# Patient Record
Sex: FEMALE | Race: WHITE | Employment: PART TIME | URBAN - NONMETROPOLITAN AREA
[De-identification: names, ages, dates, MRNs, and addresses within clinical notes are randomized per-mention and may not be internally consistent; named-entity substitution may affect disease eponyms.]

---

## 2017-02-27 ENCOUNTER — NURSE TRIAGE (OUTPATIENT)
Dept: ADMINISTRATIVE | Age: 57
End: 2017-02-27

## 2017-03-24 PROBLEM — G93.40 ENCEPHALOPATHY ACUTE: Status: ACTIVE | Noted: 2017-03-24

## 2017-03-24 PROBLEM — F31.9 BIPOLAR 1 DISORDER (HCC): Chronic | Status: ACTIVE | Noted: 2017-03-24

## 2017-03-29 ENCOUNTER — TELEPHONE (OUTPATIENT)
Dept: CARDIOLOGY | Age: 57
End: 2017-03-29

## 2017-08-15 ENCOUNTER — HOSPITAL ENCOUNTER (OUTPATIENT)
Age: 57
Discharge: HOME | End: 2017-08-15
Payer: MEDICARE

## 2017-08-15 LAB
ANISOCYTOSIS: ABNORMAL
BASOPHILS # BLD: 0.4 %
BASOPHILS ABSOLUTE: 0 THOU/MM3 (ref 0–0.1)
EOSINOPHIL # BLD: 7.4 %
EOSINOPHILS ABSOLUTE: 0.5 THOU/MM3 (ref 0–0.4)
HCT VFR BLD CALC: 41.1 % (ref 37–47)
HEMOGLOBIN: 13.4 GM/DL (ref 12–16)
LYMPHOCYTES # BLD: 19 %
LYMPHOCYTES ABSOLUTE: 1.2 THOU/MM3 (ref 1–4.8)
MCH RBC QN AUTO: 27.4 PG (ref 27–31)
MCHC RBC AUTO-ENTMCNC: 32.7 GM/DL (ref 33–37)
MCV RBC AUTO: 83.8 FL (ref 81–99)
MONOCYTES # BLD: 6.8 %
MONOCYTES ABSOLUTE: 0.4 THOU/MM3 (ref 0.4–1.3)
NEUTROPHILS ABSOLUTE: 4300 MM3 (ref 1800–77)
NUCLEATED RED BLOOD CELLS: 0 /100 WBC
PDW BLD-RTO: 17.3 % (ref 11.5–14.5)
PLATELET # BLD: 225 THOU/MM3 (ref 130–400)
PMV BLD AUTO: 7.7 MCM (ref 7.4–10.4)
RBC # BLD: 4.9 MILL/MM3 (ref 4.2–5.4)
RBC # BLD: NORMAL 10*6/UL
SEG NEUTROPHILS: 66.4 %
SEGMENTED NEUTROPHILS ABSOLUTE COUNT: 4.3 THOU/MM3 (ref 1.8–7.7)
WBC # BLD: 6.5 THOU/MM3 (ref 4.8–10.8)

## 2017-08-15 PROCEDURE — 36415 COLL VENOUS BLD VENIPUNCTURE: CPT

## 2017-08-15 PROCEDURE — 85025 COMPLETE CBC W/AUTO DIFF WBC: CPT

## 2017-09-14 ENCOUNTER — HOSPITAL ENCOUNTER (OUTPATIENT)
Age: 57
Discharge: HOME | End: 2017-09-14
Payer: MEDICARE

## 2017-09-14 LAB
ANION GAP SERPL CALCULATED.3IONS-SCNC: 13 MEQ/L (ref 8–16)
ANISOCYTOSIS: ABNORMAL
BASOPHILS # BLD: 0.4 %
BASOPHILS ABSOLUTE: 0 THOU/MM3 (ref 0–0.1)
BUN BLDV-MCNC: 12 MG/DL (ref 7–22)
CALCIUM SERPL-MCNC: 9.6 MG/DL (ref 8.5–10.5)
CHLORIDE BLD-SCNC: 103 MEQ/L (ref 98–111)
CO2: 26 MEQ/L (ref 23–33)
CREAT SERPL-MCNC: 0.7 MG/DL (ref 0.4–1.2)
EOSINOPHIL # BLD: 5.5 %
EOSINOPHILS ABSOLUTE: 0.3 THOU/MM3 (ref 0–0.4)
GFR SERPL CREATININE-BSD FRML MDRD: 86 ML/MIN/1.73M2
GLUCOSE BLD-MCNC: 103 MG/DL (ref 70–108)
HCT VFR BLD CALC: 39.8 % (ref 37–47)
HEMOGLOBIN: 12.9 GM/DL (ref 12–16)
LITHIUM LEVEL: 0.57 MMOL/L (ref 0.6–1.2)
LYMPHOCYTES # BLD: 22 %
LYMPHOCYTES ABSOLUTE: 1.3 THOU/MM3 (ref 1–4.8)
MCH RBC QN AUTO: 27.6 PG (ref 27–31)
MCHC RBC AUTO-ENTMCNC: 32.4 GM/DL (ref 33–37)
MCV RBC AUTO: 85.2 FL (ref 81–99)
MONOCYTES # BLD: 8.3 %
MONOCYTES ABSOLUTE: 0.5 THOU/MM3 (ref 0.4–1.3)
NUCLEATED RED BLOOD CELLS: 0 /100 WBC
PDW BLD-RTO: 18.2 % (ref 11.5–14.5)
PLATELET # BLD: 193 THOU/MM3 (ref 130–400)
PMV BLD AUTO: 7.3 MCM (ref 7.4–10.4)
POTASSIUM SERPL-SCNC: 4.4 MEQ/L (ref 3.5–5.2)
RBC # BLD: 4.68 MILL/MM3 (ref 4.2–5.4)
RBC # BLD: NORMAL 10*6/UL
SEG NEUTROPHILS: 63.8 %
SEGMENTED NEUTROPHILS ABSOLUTE COUNT: 3.7 THOU/MM3 (ref 1.8–7.7)
SODIUM BLD-SCNC: 142 MEQ/L (ref 135–145)
WBC # BLD: 5.8 THOU/MM3 (ref 4.8–10.8)

## 2017-09-14 PROCEDURE — 36415 COLL VENOUS BLD VENIPUNCTURE: CPT

## 2017-09-14 PROCEDURE — 80178 ASSAY OF LITHIUM: CPT

## 2017-09-14 PROCEDURE — 85025 COMPLETE CBC W/AUTO DIFF WBC: CPT

## 2017-09-14 PROCEDURE — 80048 BASIC METABOLIC PNL TOTAL CA: CPT

## 2017-10-05 ENCOUNTER — HOSPITAL ENCOUNTER (OUTPATIENT)
Age: 57
Discharge: HOME | End: 2017-10-05
Payer: MEDICARE

## 2017-10-05 LAB
ANION GAP SERPL CALCULATED.3IONS-SCNC: 8 MEQ/L (ref 8–16)
ANISOCYTOSIS: ABNORMAL
BASOPHILS # BLD: 0.5 %
BASOPHILS ABSOLUTE: 0 THOU/MM3 (ref 0–0.1)
BUN BLDV-MCNC: 14 MG/DL (ref 7–22)
CALCIUM SERPL-MCNC: 9.9 MG/DL (ref 8.5–10.5)
CHLORIDE BLD-SCNC: 105 MEQ/L (ref 98–111)
CO2: 29 MEQ/L (ref 23–33)
CREAT SERPL-MCNC: 0.7 MG/DL (ref 0.4–1.2)
EOSINOPHIL # BLD: 6 %
EOSINOPHILS ABSOLUTE: 0.5 THOU/MM3 (ref 0–0.4)
GFR SERPL CREATININE-BSD FRML MDRD: 86 ML/MIN/1.73M2
GLUCOSE BLD-MCNC: 95 MG/DL (ref 70–108)
HCT VFR BLD CALC: 39.5 % (ref 37–47)
HEMOGLOBIN: 13 GM/DL (ref 12–16)
LITHIUM LEVEL: 0.55 MMOL/L (ref 0.6–1.2)
LYMPHOCYTES # BLD: 17.6 %
LYMPHOCYTES ABSOLUTE: 1.3 THOU/MM3 (ref 1–4.8)
MCH RBC QN AUTO: 28 PG (ref 27–31)
MCHC RBC AUTO-ENTMCNC: 32.9 GM/DL (ref 33–37)
MCV RBC AUTO: 85.1 FL (ref 81–99)
MONOCYTES # BLD: 7.3 %
MONOCYTES ABSOLUTE: 0.6 THOU/MM3 (ref 0.4–1.3)
NUCLEATED RED BLOOD CELLS: 0 /100 WBC
PDW BLD-RTO: 17.6 % (ref 11.5–14.5)
PLATELET # BLD: 223 THOU/MM3 (ref 130–400)
PMV BLD AUTO: 7.6 MCM (ref 7.4–10.4)
POTASSIUM SERPL-SCNC: 4.9 MEQ/L (ref 3.5–5.2)
RBC # BLD: 4.64 MILL/MM3 (ref 4.2–5.4)
RBC # BLD: NORMAL 10*6/UL
SEG NEUTROPHILS: 68.6 %
SEGMENTED NEUTROPHILS ABSOLUTE COUNT: 5.2 THOU/MM3 (ref 1.8–7.7)
SODIUM BLD-SCNC: 142 MEQ/L (ref 135–145)
WBC # BLD: 7.6 THOU/MM3 (ref 4.8–10.8)

## 2017-10-05 PROCEDURE — 80178 ASSAY OF LITHIUM: CPT

## 2017-10-05 PROCEDURE — 36415 COLL VENOUS BLD VENIPUNCTURE: CPT

## 2017-10-05 PROCEDURE — 85025 COMPLETE CBC W/AUTO DIFF WBC: CPT

## 2017-10-05 PROCEDURE — 80048 BASIC METABOLIC PNL TOTAL CA: CPT

## 2018-02-06 ENCOUNTER — APPOINTMENT (OUTPATIENT)
Dept: CT IMAGING | Age: 58
End: 2018-02-06
Payer: MEDICARE

## 2018-02-06 ENCOUNTER — HOSPITAL ENCOUNTER (EMERGENCY)
Age: 58
Discharge: HOME | End: 2018-02-07
Attending: EMERGENCY MEDICINE
Payer: MEDICARE

## 2018-02-06 ENCOUNTER — HOSPITAL ENCOUNTER (EMERGENCY)
Dept: GENERAL RADIOLOGY | Age: 58
Discharge: HOME | End: 2018-02-06
Payer: MEDICARE

## 2018-02-06 DIAGNOSIS — I50.20 SYSTOLIC CONGESTIVE HEART FAILURE, UNSPECIFIED CONGESTIVE HEART FAILURE CHRONICITY: Primary | ICD-10-CM

## 2018-02-06 LAB
ALBUMIN SERPL-MCNC: 3.2 G/DL (ref 3.5–5.1)
ALP BLD-CCNC: 63 U/L (ref 38–126)
ALT SERPL-CCNC: 9 U/L (ref 11–66)
ANION GAP SERPL CALCULATED.3IONS-SCNC: 12 MEQ/L (ref 8–16)
ANISOCYTOSIS: ABNORMAL
AST SERPL-CCNC: 12 U/L (ref 5–40)
BASOPHILS # BLD: 0.6 %
BASOPHILS ABSOLUTE: 0 THOU/MM3 (ref 0–0.1)
BILIRUB SERPL-MCNC: 0.2 MG/DL (ref 0.3–1.2)
BILIRUBIN DIRECT: < 0.2 MG/DL (ref 0–0.3)
BUN BLDV-MCNC: 20 MG/DL (ref 7–22)
CALCIUM SERPL-MCNC: 9.4 MG/DL (ref 8.5–10.5)
CHLORIDE BLD-SCNC: 103 MEQ/L (ref 98–111)
CO2: 25 MEQ/L (ref 23–33)
CREAT SERPL-MCNC: 0.7 MG/DL (ref 0.4–1.2)
EKG ATRIAL RATE: 67 BPM
EKG P AXIS: 30 DEGREES
EKG P-R INTERVAL: 138 MS
EKG Q-T INTERVAL: 418 MS
EKG QRS DURATION: 90 MS
EKG QTC CALCULATION (BAZETT): 441 MS
EKG R AXIS: 14 DEGREES
EKG T AXIS: 92 DEGREES
EKG VENTRICULAR RATE: 67 BPM
EOSINOPHIL # BLD: 3.3 %
EOSINOPHILS ABSOLUTE: 0.2 THOU/MM3 (ref 0–0.4)
ETHYL ALCOHOL, SERUM: < 0.01 %
FLU A ANTIGEN: NEGATIVE
FLU B ANTIGEN: NEGATIVE
GFR SERPL CREATININE-BSD FRML MDRD: 86 ML/MIN/1.73M2
GLUCOSE BLD-MCNC: 122 MG/DL (ref 70–108)
HCT VFR BLD CALC: 37.5 % (ref 37–47)
HEMOGLOBIN: 12.1 GM/DL (ref 12–16)
LIPASE: 18.9 U/L (ref 5.6–51.3)
LITHIUM LEVEL: 0.28 MMOL/L (ref 0.6–1.2)
LYMPHOCYTES # BLD: 16 %
LYMPHOCYTES ABSOLUTE: 1.1 THOU/MM3 (ref 1–4.8)
MAGNESIUM: 2.1 MG/DL (ref 1.6–2.4)
MCH RBC QN AUTO: 27.1 PG (ref 27–31)
MCHC RBC AUTO-ENTMCNC: 32.2 GM/DL (ref 33–37)
MCV RBC AUTO: 84.1 FL (ref 81–99)
MONOCYTES # BLD: 8.8 %
MONOCYTES ABSOLUTE: 0.6 THOU/MM3 (ref 0.4–1.3)
NUCLEATED RED BLOOD CELLS: 0 /100 WBC
OSMOLALITY CALCULATION: 283.3 MOSMOL/KG (ref 275–300)
PDW BLD-RTO: 17.8 % (ref 11.5–14.5)
PLATELET # BLD: 225 THOU/MM3 (ref 130–400)
PMV BLD AUTO: 7.4 FL (ref 7.4–10.4)
POTASSIUM SERPL-SCNC: 4.5 MEQ/L (ref 3.5–5.2)
RBC # BLD: 4.46 MILL/MM3 (ref 4.2–5.4)
SEG NEUTROPHILS: 71.3 %
SEGMENTED NEUTROPHILS ABSOLUTE COUNT: 5.1 THOU/MM3 (ref 1.8–7.7)
SODIUM BLD-SCNC: 140 MEQ/L (ref 135–145)
TOTAL PROTEIN: 7.1 G/DL (ref 6.1–8)
TROPONIN T: < 0.01 NG/ML
WBC # BLD: 7.1 THOU/MM3 (ref 4.8–10.8)

## 2018-02-06 PROCEDURE — 80164 ASSAY DIPROPYLACETIC ACD TOT: CPT

## 2018-02-06 PROCEDURE — 83690 ASSAY OF LIPASE: CPT

## 2018-02-06 PROCEDURE — 71260 CT THORAX DX C+: CPT

## 2018-02-06 PROCEDURE — 82248 BILIRUBIN DIRECT: CPT

## 2018-02-06 PROCEDURE — 96375 TX/PRO/DX INJ NEW DRUG ADDON: CPT

## 2018-02-06 PROCEDURE — 99284 EMERGENCY DEPT VISIT MOD MDM: CPT

## 2018-02-06 PROCEDURE — 80053 COMPREHEN METABOLIC PANEL: CPT

## 2018-02-06 PROCEDURE — 74177 CT ABD & PELVIS W/CONTRAST: CPT

## 2018-02-06 PROCEDURE — 70450 CT HEAD/BRAIN W/O DYE: CPT

## 2018-02-06 PROCEDURE — 93005 ELECTROCARDIOGRAM TRACING: CPT | Performed by: EMERGENCY MEDICINE

## 2018-02-06 PROCEDURE — 84484 ASSAY OF TROPONIN QUANT: CPT

## 2018-02-06 PROCEDURE — G0480 DRUG TEST DEF 1-7 CLASSES: HCPCS

## 2018-02-06 PROCEDURE — 80178 ASSAY OF LITHIUM: CPT

## 2018-02-06 PROCEDURE — 36415 COLL VENOUS BLD VENIPUNCTURE: CPT

## 2018-02-06 PROCEDURE — 87804 INFLUENZA ASSAY W/OPTIC: CPT

## 2018-02-06 PROCEDURE — C9113 INJ PANTOPRAZOLE SODIUM, VIA: HCPCS | Performed by: EMERGENCY MEDICINE

## 2018-02-06 PROCEDURE — 71046 X-RAY EXAM CHEST 2 VIEWS: CPT

## 2018-02-06 PROCEDURE — 85025 COMPLETE CBC W/AUTO DIFF WBC: CPT

## 2018-02-06 PROCEDURE — 99215 OFFICE O/P EST HI 40 MIN: CPT

## 2018-02-06 PROCEDURE — 96374 THER/PROPH/DIAG INJ IV PUSH: CPT

## 2018-02-06 PROCEDURE — 83735 ASSAY OF MAGNESIUM: CPT

## 2018-02-06 PROCEDURE — 99213 OFFICE O/P EST LOW 20 MIN: CPT | Performed by: NURSE PRACTITIONER

## 2018-02-06 PROCEDURE — 6360000002 HC RX W HCPCS: Performed by: EMERGENCY MEDICINE

## 2018-02-06 PROCEDURE — 6360000004 HC RX CONTRAST MEDICATION: Performed by: EMERGENCY MEDICINE

## 2018-02-06 RX ORDER — CLOZAPINE 50 MG/1
100 TABLET ORAL DAILY
COMMUNITY

## 2018-02-06 RX ORDER — AMOXICILLIN AND CLAVULANATE POTASSIUM 875; 125 MG/1; MG/1
1 TABLET, FILM COATED ORAL 2 TIMES DAILY
Status: ON HOLD | COMMUNITY
End: 2018-02-11 | Stop reason: HOSPADM

## 2018-02-06 RX ORDER — LITHIUM CARBONATE 300 MG/1
300 CAPSULE ORAL NIGHTLY
COMMUNITY

## 2018-02-06 RX ORDER — PANTOPRAZOLE SODIUM 40 MG/10ML
40 INJECTION, POWDER, LYOPHILIZED, FOR SOLUTION INTRAVENOUS ONCE
Status: COMPLETED | OUTPATIENT
Start: 2018-02-06 | End: 2018-02-06

## 2018-02-06 RX ORDER — ONDANSETRON 2 MG/ML
4 INJECTION INTRAMUSCULAR; INTRAVENOUS ONCE
Status: COMPLETED | OUTPATIENT
Start: 2018-02-06 | End: 2018-02-06

## 2018-02-06 RX ADMIN — IOPAMIDOL 80 ML: 755 INJECTION, SOLUTION INTRAVENOUS at 23:45

## 2018-02-06 RX ADMIN — ONDANSETRON 4 MG: 2 INJECTION INTRAMUSCULAR; INTRAVENOUS at 21:53

## 2018-02-06 RX ADMIN — PANTOPRAZOLE SODIUM 40 MG: 40 INJECTION, POWDER, FOR SOLUTION INTRAVENOUS at 21:53

## 2018-02-06 ASSESSMENT — ENCOUNTER SYMPTOMS
SINUS PRESSURE: 1
RHINORRHEA: 0
EYE REDNESS: 0
EYE ITCHING: 0
EYE DISCHARGE: 0
SINUS PRESSURE: 0
DIARRHEA: 0
BLOOD IN STOOL: 0
CONSTIPATION: 1
ABDOMINAL DISTENTION: 0
PHOTOPHOBIA: 0
CHEST TIGHTNESS: 0
SHORTNESS OF BREATH: 0
ABDOMINAL PAIN: 1
VOICE CHANGE: 0
WHEEZING: 0
VOMITING: 0
EYE PAIN: 0
SINUS PAIN: 0
CHOKING: 0
RHINORRHEA: 1
BACK PAIN: 0
VOMITING: 1
COLOR CHANGE: 0
SORE THROAT: 1
COUGH: 1
SORE THROAT: 0
NAUSEA: 0
TROUBLE SWALLOWING: 0

## 2018-02-06 ASSESSMENT — PAIN DESCRIPTION - PAIN TYPE: TYPE: ACUTE PAIN

## 2018-02-06 ASSESSMENT — PAIN SCALES - WONG BAKER: WONGBAKER_NUMERICALRESPONSE: 6

## 2018-02-07 VITALS
WEIGHT: 169 LBS | OXYGEN SATURATION: 96 % | RESPIRATION RATE: 16 BRPM | TEMPERATURE: 97.9 F | BODY MASS INDEX: 23.57 KG/M2 | SYSTOLIC BLOOD PRESSURE: 128 MMHG | HEART RATE: 59 BPM | DIASTOLIC BLOOD PRESSURE: 53 MMHG

## 2018-02-07 LAB
AMPHETAMINE+METHAMPHETAMINE URINE SCREEN: NEGATIVE
BACTERIA: ABNORMAL /HPF
BARBITURATE QUANTITATIVE URINE: NEGATIVE
BENZODIAZEPINE QUANTITATIVE URINE: NEGATIVE
BILIRUBIN URINE: NEGATIVE
BLOOD, URINE: NEGATIVE
CANNABINOID QUANTITATIVE URINE: NEGATIVE
CASTS 2: ABNORMAL /LPF
CASTS UA: ABNORMAL /LPF
CHARACTER, URINE: CLEAR
COCAINE METABOLITE QUANTITATIVE URINE: NEGATIVE
COLOR: YELLOW
CRYSTALS, UA: ABNORMAL
EPITHELIAL CELLS, UA: ABNORMAL /HPF
GLUCOSE URINE: NEGATIVE MG/DL
KETONES, URINE: NEGATIVE
LEUKOCYTE ESTERASE, URINE: ABNORMAL
MISCELLANEOUS 2: ABNORMAL
NITRITE, URINE: NEGATIVE
OPIATES, URINE: POSITIVE
OXYCODONE: NEGATIVE
PH UA: 5.5
PHENCYCLIDINE QUANTITATIVE URINE: NEGATIVE
PROTEIN UA: NEGATIVE
RBC URINE: ABNORMAL /HPF
RENAL EPITHELIAL, UA: ABNORMAL
SPECIFIC GRAVITY, URINE: 1.02 (ref 1–1.03)
UROBILINOGEN, URINE: 0.2 EU/DL
VALPROIC ACID LEVEL: 18.8 UG/ML (ref 50–100)
WBC UA: ABNORMAL /HPF
YEAST: ABNORMAL

## 2018-02-07 PROCEDURE — 80307 DRUG TEST PRSMV CHEM ANLYZR: CPT

## 2018-02-07 PROCEDURE — 93010 ELECTROCARDIOGRAM REPORT: CPT | Performed by: INTERNAL MEDICINE

## 2018-02-07 PROCEDURE — 81001 URINALYSIS AUTO W/SCOPE: CPT

## 2018-02-07 PROCEDURE — 87086 URINE CULTURE/COLONY COUNT: CPT

## 2018-02-07 RX ORDER — FUROSEMIDE 20 MG/1
20 TABLET ORAL 2 TIMES DAILY
Qty: 60 TABLET | Refills: 0 | Status: SHIPPED | OUTPATIENT
Start: 2018-02-07 | End: 2018-02-07

## 2018-02-07 RX ORDER — FUROSEMIDE 20 MG/1
20 TABLET ORAL DAILY
Qty: 7 TABLET | Refills: 0 | Status: SHIPPED | OUTPATIENT
Start: 2018-02-07 | End: 2018-02-27

## 2018-02-07 RX ORDER — GUAIFENESIN/DEXTROMETHORPHAN 100-10MG/5
5 SYRUP ORAL 3 TIMES DAILY PRN
Qty: 120 ML | Refills: 0 | Status: SHIPPED | OUTPATIENT
Start: 2018-02-07 | End: 2018-02-17

## 2018-02-07 NOTE — ED NOTES
Pt to ED from urgent care. PT stated has been having a cough for about 2 weeks and has not been relieved with medication.       Emerita Lincoln RN  02/06/18 9056

## 2018-02-07 NOTE — ED PROVIDER NOTES
[facility]  eMERGENCY dEPARTMENT eNCOUnter          [address] [facility]       Chief Complaint   Patient presents with    Cough    Headache    Abdominal Pain       Nurses Notes reviewed and I agree except as noted in the HPI. HISTORY OF PRESENT ILLNESS    Bandar Goodwin is a 61 y.o. female who presents to the Emergency Department for the evaluation of an unproductive cough, headache and abdominal pain. She reports that her cough has been ongoing for the past 2 weeks. The patient reports that she has had a decreased appetite which was concerning for her caregivers. Per the patient's caregiver the patient was evaluated prior to her arrival in the emergency department by an ambulatory care clinic. The health provider at the clinic heard abnormal breath sounds and a chest x-ray was ordered which showed a possible pneumonia, possible CHF, or possible pulmonary edema. She was sent here for further evaluation. The patient has chronic constipation and reports vomiting last week. She denies any other signs or symptoms at this time. The patient was recently treated for a UTI and has not yet finished the amoxicillin regimen she was placed on. The patient lives in her own home with home health nurses. The HPI was provided by the patient. REVIEW OF SYSTEMS     Review of Systems   Constitutional: Positive for appetite change (decreased). Negative for activity change, diaphoresis, fatigue and unexpected weight change. HENT: Negative for congestion, ear discharge, ear pain, hearing loss, rhinorrhea, sinus pressure, sore throat, trouble swallowing and voice change. Eyes: Negative for photophobia, pain, discharge, redness and itching. Respiratory: Positive for cough (unproductive). Negative for choking, chest tightness, shortness of breath and wheezing. Cardiovascular: Negative for chest pain, palpitations and leg swelling.    Gastrointestinal: Positive for abdominal pain, constipation sodium (COLACE) 100 MG capsule Take 100 mg by mouth dailyHistorical Med      omeprazole (PRILOSEC) 20 MG delayed release capsule Take 20 mg by mouth dailyHistorical Med      pravastatin (PRAVACHOL) 40 MG tablet Take 40 mg by mouth nightlyHistorical Med      melatonin 3 MG TABS tablet Take 5 mg by mouth daily Historical Med      oxybutynin (DITROPAN XL) 15 MG CR tablet Take 15 mg by mouth daily      divalproex (DEPAKOTE) 500 MG ER tablet Take 1 tablet by mouth nightly., Disp-30 tablet      calcitonin, salmon, (MIACALCIN) 200 UNIT/ACT nasal spray 1 spray by Nasal route daily. , Disp-1 Bottle      aspirin 81 MG EC tablet Take 1 tablet by mouth daily. , Disp-30 tablet, R-0      polyethylene glycol (GLYCOLAX) powder Take 17 g by mouth as needed Historical Med      chlorhexidine (PERIDEX) 0.12 % solution Take 15 mLs by mouth 2 times daily. fludrocortisone (FLORINEF) 0.1 MG tablet Take 0.1 mg by mouth daily. levothyroxine (SYNTHROID) 125 MCG tablet Take 100 mcg by mouth Daily       therapeutic multivitamin-minerals (THERAGRAN-M) tablet Take 1 tablet by mouth daily. magnesium hydroxide (MILK OF MAGNESIA) 400 MG/5ML suspension Take 30 mLs by mouth daily as needed for Constipation. calcium-vitamin D (OSCAL-500) 500-200 MG-UNIT per tablet Take 1 tablet by mouth 3 times daily. , Disp-30 tablet      acetaminophen (TYLENOL) 325 MG tablet Take 650 mg by mouth every 4 hours as needed for Pain or Fever. calcium carbonate (TUMS) 500 MG chewable tablet Take 1 tablet by mouth 4 times daily as needed for Heartburn. Indications: after meals and brdtime prn      loperamide (IMODIUM) 2 MG capsule Take 2 mg by mouth as needed for Diarrhea. ALLERGIES     is allergic to nsaids. FAMILY HISTORY     indicated that her mother is alive. family history includes Cancer in her mother. SOCIAL HISTORY      reports that she has never smoked.  She has never used smokeless tobacco. She reports that she does not MICRO - Abnormal; Notable for the following:     Leukocyte Esterase, Urine TRACE (*)     All other components within normal limits   RAPID INFLUENZA A/B ANTIGENS   URINE CULTURE, REFLEXED   LIPASE   TROPONIN   MAGNESIUM   ETHANOL   URINE DRUG SCREEN   OSMOLALITY   ANION GAP       EMERGENCY DEPARTMENT COURSE:   Vitals:    Vitals:    02/06/18 2315 02/07/18 0042 02/07/18 0145 02/07/18 0248   BP: 127/65 139/76  (!) 128/53   Pulse: 65 64 67 59   Resp: 17 18 17 16   Temp:       TempSrc:       SpO2: 95% 96% 97% 96%   Weight:         9:30 PM: The patient was seen and evaluated. Appropriate labs were ordered and reviewed. An x-ray of the chest was ordered and reviewed with results which showed bilateral perihilar infiltrates likely represent underlying mild pulmonary edema. There is also more focal airspace disease at the right lung base which may represent underlying atelectasis or pneumonia. Moderate cardiomegaly is again seen. This is unchanged from prior examination. Gaseous distention of multiple bowel loops are seen overlying the visualized upper abdomen. Patient has multiple complaints of multiple body systems not all that makes sense. Patient was scanned and re-x-rayed. CT scan of the head was negative chest showed bilateral pulmonary edema abdomen showed no acute findings. At this point I feel that she has an exacerbation of her CHF. She does see Dr. Vishnu Echevarria for this. I will put her out on Lasix for 7 days, have a caregiver's follow-up with her cardiologist and return to the emergency room if any new or worsening complaints. Patient will be given Robitussin for her cough. She is instructed to take all other medications as prescribed. Patient and caregiver both understood and agreed with the plan. Patient is subsequently discharged home in good condition. Patient has what appears to be a mild congestive heart failure attack.   Patient has been given Lasix, caregivers are instructed to give it as prescribed. Caregivers are instructed to call the primary care physician and schedule follow-up appointment. Patient has been given cough medication for the bothersome cough. Caregivers are instructed to give it as prescribed. Caregivers are instructed to take the patient's weight daily. As she might be worsening with congestive heart failure may need to be on a diuretic daily. Caregivers are instructed to watch the patient closely and return to the emergency room immediately for any new or worsening complaints. CRITICAL CARE:   None     CONSULTS:  None    PROCEDURES:  None    FINAL IMPRESSION      1. Systolic congestive heart failure, unspecified congestive heart failure chronicity ([address]          DISPOSITION/PLAN   Discharge    PATIENT REFERRED TO:  Jennifer Villarreal MD  [address]  [address] [facility] [place] [address] [phone]    Call in 2 days        DISCHARGE MEDICATIONS:  Discharge Medication List as of 2/7/2018  3:05 AM      START taking these medications    Details   furosemide (LASIX) 20 MG tablet Take 1 tablet by mouth daily for 7 days, Disp-7 tablet, R-0Print      guaiFENesin-dextromethorphan (ROBITUSSIN DM) 100-10 MG/5ML syrup Take 5 mLs by mouth 3 times daily as needed for Cough, Disp-120 mL, R-0Print             (Please note that portions of this note were completed with a voice recognition program.  Efforts were made to edit the dictations but occasionally words are mis-transcribed.)    Scribe:  Ale Espinal 2/6/18 9:30 PM Scribing for and in the presence of Audra Lopez DO. Scribe: Ale Espinal 2/6/18 9:30 PM    Provider:  I personally performed the services described in the documentation, reviewed and edited the documentation which was dictated to the scribe in my presence, and it accurately records my words and actions.     Audra Lopez DO 2/6/18 6:42 AM      Audra Lopez DO  02/07/18 7853

## 2018-02-07 NOTE — ED PROVIDER NOTES
Medication List      CONTINUE these medications which have NOT CHANGED    Details   guaiFENesin (ROBITUSSIN) 100 MG/5ML SOLN oral solution Take 10 mLs by mouth every 4 hours as needed for Cough  Qty: 473 mL, Refills: 0      metoprolol tartrate (LOPRESSOR) 25 MG tablet Take 0.5 tablets by mouth 2 times daily Hold for SBP < 100 or HR < 55  Qty: 30 tablet, Refills: 0      docusate sodium (COLACE) 100 MG capsule Take 100 mg by mouth daily      omeprazole (PRILOSEC) 20 MG delayed release capsule Take 20 mg by mouth daily      pravastatin (PRAVACHOL) 40 MG tablet Take 40 mg by mouth nightly      melatonin 3 MG TABS tablet Take 3 mg by mouth daily      oxybutynin (DITROPAN XL) 15 MG CR tablet Take 15 mg by mouth daily      divalproex (DEPAKOTE) 500 MG ER tablet Take 1 tablet by mouth nightly. Qty: 30 tablet      magnesium hydroxide (MILK OF MAGNESIA) 400 MG/5ML suspension Take 30 mLs by mouth daily as needed for Constipation. calcium-vitamin D (OSCAL-500) 500-200 MG-UNIT per tablet Take 1 tablet by mouth 3 times daily. Qty: 30 tablet      calcitonin, salmon, (MIACALCIN) 200 UNIT/ACT nasal spray 1 spray by Nasal route daily. Qty: 1 Bottle      ondansetron (ZOFRAN ODT) 4 MG disintegrating tablet Take 1 tablet by mouth every 8 hours as needed for Nausea. Qty: 20 tablet, Refills: 0      aspirin 81 MG EC tablet Take 1 tablet by mouth daily. Qty: 30 tablet, Refills: 0      acetaminophen (TYLENOL) 325 MG tablet Take 650 mg by mouth every 4 hours as needed for Pain or Fever. calcium carbonate (TUMS) 500 MG chewable tablet Take 1 tablet by mouth 4 times daily as needed for Heartburn. Indications: after meals and brdtime prn      loperamide (IMODIUM) 2 MG capsule Take 2 mg by mouth as needed for Diarrhea.      polyethylene glycol (GLYCOLAX) powder Take 17 g by mouth as needed       chlorhexidine (PERIDEX) 0.12 % solution Take 15 mLs by mouth 2 times daily.       fludrocortisone (FLORINEF) 0.1 MG tablet Take 0.1 mg by pulses. Exam reveals no gallop and no friction rub. Pulmonary/Chest: Effort normal. She has decreased breath sounds. She has rhonchi in the right lower field. Abdominal: Normal appearance. There is no tenderness. There is no CVA tenderness. Musculoskeletal: Normal range of motion. Neurological: She is alert and oriented to person, place, and time. She has normal strength. Skin: Skin is warm, dry and intact. No rashes noted on exposed skin. Psychiatric: She has a normal mood and affect. Her speech is normal and behavior is normal.   Nursing note and vitals reviewed. DIAGNOSTIC RESULTS   Labs:No results found for this visit on 02/06/18. IMAGING:    XR CHEST STANDARD (2 VW)   Final Result   1. Bilateral perihilar infiltrates likely represent underlying mild pulmonary edema. There is also more focal airspace disease at the right lung base which may represent underlying atelectasis or pneumonia. 2. Moderate cardiomegaly is again seen. This is unchanged from prior examination. 3. Gaseous distention of multiple bowel loops are seen overlying the visualized upper abdomen. **This report has been created using voice recognition software. It may contain minor errors which are inherent in voice recognition technology. **      Final report electronically signed by Dr. Valerie Epps on 2/6/2018 7:38 PM            EKG:      URGENT CARE COURSE:     Vitals:    02/06/18 1849   BP: 132/77   Pulse: 76   Resp: 18   Temp: 98.6 °F (37 °C)   SpO2: 95%   Weight: 169 lb (76.7 kg)       Medications - No data to display    ED Course        PROCEDURES:  None    FINAL IMPRESSION      1.  Cough          DISPOSITION/PLAN   DISPOSITION  After reviewing the patients History of Present illness, Vital Signs,Clinical Findings,Comorbities, and Clinical Data obtained I discussed with the patient or patient representative that there is a very real potential for serious injury / illness and the patient will need to be transfer to a level of higher care for further evaluation and continued care. It was explained that this would provide the best care for the patient. The patient/patient representative are agreeable to the treatment plan and are agreeable to be transferred therefore, the patient will be transferred to [facility] emergency department for reevaluation and further management . Report was called to the accepting facility and given to [address] [place] [address] who graciously accepted the patients transfer. Patient was transferred from the Urgent Care in stable condition by private car with caregiver.      Decision To Transfer 02/06/2018 07:48:41 PM    PATIENT REFERRED TO:  [facility]  [address] [place] [phone]  Go to         DISCHARGE MEDICATIONS:  Current Discharge Medication List          Current Discharge Medication List          Current Discharge Medication List          Vandana Yadav NP    (Please note that portions of this note were completed with a voice recognition program.  Efforts were made to edit the dictations but occasionally words are mis-transcribed.)            Vandana Yadav NP  02/06/18 2216

## 2018-02-07 NOTE — ED NOTES
Pt resting on cot, respires easy and unlabored. Lights dimmed for comfort. Caregiver at bedside.       Raeford Hatchet, RN  02/07/18 1789

## 2018-02-08 ENCOUNTER — APPOINTMENT (OUTPATIENT)
Dept: CT IMAGING | Age: 58
DRG: 313 | End: 2018-02-08
Payer: MEDICARE

## 2018-02-08 ENCOUNTER — APPOINTMENT (OUTPATIENT)
Dept: GENERAL RADIOLOGY | Age: 58
DRG: 313 | End: 2018-02-08
Payer: MEDICARE

## 2018-02-08 ENCOUNTER — HOSPITAL ENCOUNTER (INPATIENT)
Age: 58
LOS: 3 days | Discharge: HOME | DRG: 313 | End: 2018-02-11
Attending: EMERGENCY MEDICINE | Admitting: FAMILY MEDICINE
Payer: MEDICARE

## 2018-02-08 DIAGNOSIS — F79 MENTAL RETARDATION: ICD-10-CM

## 2018-02-08 DIAGNOSIS — I31.39 PERICARDIAL EFFUSION: ICD-10-CM

## 2018-02-08 DIAGNOSIS — K80.20 CALCULUS OF GALLBLADDER WITHOUT CHOLECYSTITIS WITHOUT OBSTRUCTION: ICD-10-CM

## 2018-02-08 DIAGNOSIS — J18.9 PNEUMONIA OF LEFT LOWER LOBE DUE TO INFECTIOUS ORGANISM: Primary | ICD-10-CM

## 2018-02-08 DIAGNOSIS — R07.89 OTHER CHEST PAIN: ICD-10-CM

## 2018-02-08 DIAGNOSIS — R07.9 CHEST PAIN, UNSPECIFIED TYPE: ICD-10-CM

## 2018-02-08 LAB
ALBUMIN SERPL-MCNC: 3.5 G/DL (ref 3.5–5.1)
ALP BLD-CCNC: 69 U/L (ref 38–126)
ALT SERPL-CCNC: 9 U/L (ref 11–66)
AMYLASE: 130 U/L (ref 20–104)
ANION GAP SERPL CALCULATED.3IONS-SCNC: 11 MEQ/L (ref 8–16)
ANISOCYTOSIS: ABNORMAL
AST SERPL-CCNC: 14 U/L (ref 5–40)
BACTERIA: ABNORMAL /HPF
BASOPHILS # BLD: 0.3 %
BASOPHILS ABSOLUTE: 0 THOU/MM3 (ref 0–0.1)
BILIRUB SERPL-MCNC: 0.3 MG/DL (ref 0.3–1.2)
BILIRUBIN URINE: NEGATIVE
BLOOD, URINE: NEGATIVE
BUN BLDV-MCNC: 17 MG/DL (ref 7–22)
CALCIUM SERPL-MCNC: 9.5 MG/DL (ref 8.5–10.5)
CASTS 2: ABNORMAL /LPF
CASTS UA: ABNORMAL /LPF
CHARACTER, URINE: CLEAR
CHLORIDE BLD-SCNC: 99 MEQ/L (ref 98–111)
CO2: 29 MEQ/L (ref 23–33)
COLOR: YELLOW
CREAT SERPL-MCNC: 0.7 MG/DL (ref 0.4–1.2)
CRYSTALS, UA: ABNORMAL
D-DIMER QUANTITATIVE: 1147 NG/ML FEU (ref 0–500)
EKG ATRIAL RATE: 85 BPM
EKG P AXIS: 10 DEGREES
EKG P-R INTERVAL: 134 MS
EKG Q-T INTERVAL: 360 MS
EKG QRS DURATION: 88 MS
EKG QTC CALCULATION (BAZETT): 428 MS
EKG R AXIS: 20 DEGREES
EKG T AXIS: 18 DEGREES
EKG VENTRICULAR RATE: 85 BPM
EOSINOPHIL # BLD: 3.1 %
EOSINOPHILS ABSOLUTE: 0.2 THOU/MM3 (ref 0–0.4)
EPITHELIAL CELLS, UA: ABNORMAL /HPF
FLU A ANTIGEN: NEGATIVE
FLU B ANTIGEN: NEGATIVE
GFR SERPL CREATININE-BSD FRML MDRD: 86 ML/MIN/1.73M2
GLUCOSE BLD-MCNC: 98 MG/DL (ref 70–108)
GLUCOSE URINE: NEGATIVE MG/DL
GROUP A STREP CULTURE, REFLEX: NEGATIVE
HCT VFR BLD CALC: 37.5 % (ref 37–47)
HEMOGLOBIN: 12.2 GM/DL (ref 12–16)
HYPOCHROMIA: ABNORMAL
KETONES, URINE: NEGATIVE
LEUKOCYTE ESTERASE, URINE: ABNORMAL
LIPASE: 27.8 U/L (ref 5.6–51.3)
LITHIUM LEVEL: 0.35 MMOL/L (ref 0.6–1.2)
LYMPHOCYTES # BLD: 18.5 %
LYMPHOCYTES ABSOLUTE: 1.4 THOU/MM3 (ref 1–4.8)
MCH RBC QN AUTO: 26.5 PG (ref 27–31)
MCHC RBC AUTO-ENTMCNC: 32.4 GM/DL (ref 33–37)
MCV RBC AUTO: 81.9 FL (ref 81–99)
MISCELLANEOUS 2: ABNORMAL
MONOCYTES # BLD: 8.3 %
MONOCYTES ABSOLUTE: 0.6 THOU/MM3 (ref 0.4–1.3)
NITRITE, URINE: NEGATIVE
NUCLEATED RED BLOOD CELLS: 0 /100 WBC
OSMOLALITY CALCULATION: 279.1 MOSMOL/KG (ref 275–300)
PDW BLD-RTO: 17.5 % (ref 11.5–14.5)
PH UA: 6.5
PLATELET # BLD: 240 THOU/MM3 (ref 130–400)
PMV BLD AUTO: 7.2 FL (ref 7.4–10.4)
POTASSIUM SERPL-SCNC: 5.2 MEQ/L (ref 3.5–5.2)
PRO-BNP: 140.6 PG/ML (ref 0–900)
PROTEIN UA: NEGATIVE
RBC # BLD: 4.58 MILL/MM3 (ref 4.2–5.4)
RBC URINE: ABNORMAL /HPF
REFLEX THROAT C + S: NORMAL
RENAL EPITHELIAL, UA: ABNORMAL
SEG NEUTROPHILS: 69.8 %
SEGMENTED NEUTROPHILS ABSOLUTE COUNT: 5.4 THOU/MM3 (ref 1.8–7.7)
SODIUM BLD-SCNC: 139 MEQ/L (ref 135–145)
SPECIFIC GRAVITY, URINE: 1.01 (ref 1–1.03)
TOTAL PROTEIN: 7.6 G/DL (ref 6.1–8)
TROPONIN T: < 0.01 NG/ML
TROPONIN T: < 0.01 NG/ML
UROBILINOGEN, URINE: 0.2 EU/DL
VALPROIC ACID LEVEL: 29.2 UG/ML (ref 50–100)
WBC # BLD: 7.7 THOU/MM3 (ref 4.8–10.8)
WBC UA: ABNORMAL /HPF
YEAST: ABNORMAL

## 2018-02-08 PROCEDURE — 80053 COMPREHEN METABOLIC PANEL: CPT

## 2018-02-08 PROCEDURE — B32S1ZZ COMPUTERIZED TOMOGRAPHY (CT SCAN) OF RIGHT PULMONARY ARTERY USING LOW OSMOLAR CONTRAST: ICD-10-PCS | Performed by: RADIOLOGY

## 2018-02-08 PROCEDURE — 36415 COLL VENOUS BLD VENIPUNCTURE: CPT

## 2018-02-08 PROCEDURE — 96374 THER/PROPH/DIAG INJ IV PUSH: CPT

## 2018-02-08 PROCEDURE — 2580000003 HC RX 258: Performed by: EMERGENCY MEDICINE

## 2018-02-08 PROCEDURE — 82150 ASSAY OF AMYLASE: CPT

## 2018-02-08 PROCEDURE — 87880 STREP A ASSAY W/OPTIC: CPT

## 2018-02-08 PROCEDURE — 99223 1ST HOSP IP/OBS HIGH 75: CPT | Performed by: FAMILY MEDICINE

## 2018-02-08 PROCEDURE — 6360000004 HC RX CONTRAST MEDICATION: Performed by: EMERGENCY MEDICINE

## 2018-02-08 PROCEDURE — 6360000002 HC RX W HCPCS: Performed by: EMERGENCY MEDICINE

## 2018-02-08 PROCEDURE — 80178 ASSAY OF LITHIUM: CPT

## 2018-02-08 PROCEDURE — 2060000000 HC ICU INTERMEDIATE R&B

## 2018-02-08 PROCEDURE — B32T1ZZ COMPUTERIZED TOMOGRAPHY (CT SCAN) OF LEFT PULMONARY ARTERY USING LOW OSMOLAR CONTRAST: ICD-10-PCS | Performed by: RADIOLOGY

## 2018-02-08 PROCEDURE — 71275 CT ANGIOGRAPHY CHEST: CPT

## 2018-02-08 PROCEDURE — 93010 ELECTROCARDIOGRAM REPORT: CPT | Performed by: INTERNAL MEDICINE

## 2018-02-08 PROCEDURE — 81001 URINALYSIS AUTO W/SCOPE: CPT

## 2018-02-08 PROCEDURE — 84484 ASSAY OF TROPONIN QUANT: CPT

## 2018-02-08 PROCEDURE — 87040 BLOOD CULTURE FOR BACTERIA: CPT

## 2018-02-08 PROCEDURE — 99285 EMERGENCY DEPT VISIT HI MDM: CPT

## 2018-02-08 PROCEDURE — 80164 ASSAY DIPROPYLACETIC ACD TOT: CPT

## 2018-02-08 PROCEDURE — 93005 ELECTROCARDIOGRAM TRACING: CPT | Performed by: EMERGENCY MEDICINE

## 2018-02-08 PROCEDURE — 83690 ASSAY OF LIPASE: CPT

## 2018-02-08 PROCEDURE — 87804 INFLUENZA ASSAY W/OPTIC: CPT

## 2018-02-08 PROCEDURE — 71046 X-RAY EXAM CHEST 2 VIEWS: CPT

## 2018-02-08 PROCEDURE — 85025 COMPLETE CBC W/AUTO DIFF WBC: CPT

## 2018-02-08 PROCEDURE — 87086 URINE CULTURE/COLONY COUNT: CPT

## 2018-02-08 PROCEDURE — 74177 CT ABD & PELVIS W/CONTRAST: CPT

## 2018-02-08 PROCEDURE — 1200000003 HC TELEMETRY R&B

## 2018-02-08 PROCEDURE — 6370000000 HC RX 637 (ALT 250 FOR IP): Performed by: EMERGENCY MEDICINE

## 2018-02-08 PROCEDURE — 87070 CULTURE OTHR SPECIMN AEROBIC: CPT

## 2018-02-08 PROCEDURE — 83880 ASSAY OF NATRIURETIC PEPTIDE: CPT

## 2018-02-08 PROCEDURE — 85379 FIBRIN DEGRADATION QUANT: CPT

## 2018-02-08 PROCEDURE — 2580000003 HC RX 258

## 2018-02-08 RX ORDER — CEFTRIAXONE 1 G/1
INJECTION, POWDER, FOR SOLUTION INTRAMUSCULAR; INTRAVENOUS
Status: DISCONTINUED
Start: 2018-02-08 | End: 2018-02-09

## 2018-02-08 RX ORDER — 0.9 % SODIUM CHLORIDE 0.9 %
1000 INTRAVENOUS SOLUTION INTRAVENOUS ONCE
Status: COMPLETED | OUTPATIENT
Start: 2018-02-08 | End: 2018-02-08

## 2018-02-08 RX ORDER — AZITHROMYCIN 250 MG/1
500 TABLET, FILM COATED ORAL ONCE
Status: COMPLETED | OUTPATIENT
Start: 2018-02-08 | End: 2018-02-08

## 2018-02-08 RX ORDER — PANTOPRAZOLE SODIUM 40 MG/1
40 TABLET, DELAYED RELEASE ORAL ONCE
Status: COMPLETED | OUTPATIENT
Start: 2018-02-08 | End: 2018-02-08

## 2018-02-08 RX ORDER — 0.9 % SODIUM CHLORIDE 0.9 %
VIAL (ML) INJECTION
Status: COMPLETED
Start: 2018-02-08 | End: 2018-02-08

## 2018-02-08 RX ADMIN — PANTOPRAZOLE SODIUM 40 MG: 40 TABLET, DELAYED RELEASE ORAL at 18:42

## 2018-02-08 RX ADMIN — SODIUM CHLORIDE 10 ML: 9 INJECTION, SOLUTION INTRAMUSCULAR; INTRAVENOUS; SUBCUTANEOUS at 21:25

## 2018-02-08 RX ADMIN — Medication 30 ML: at 16:17

## 2018-02-08 RX ADMIN — AZITHROMYCIN 500 MG: 250 TABLET, FILM COATED ORAL at 21:15

## 2018-02-08 RX ADMIN — WATER 1 G: 1 INJECTION INTRAMUSCULAR; INTRAVENOUS; SUBCUTANEOUS at 21:15

## 2018-02-08 RX ADMIN — IOPAMIDOL 80 ML: 755 INJECTION, SOLUTION INTRAVENOUS at 18:16

## 2018-02-08 RX ADMIN — SODIUM CHLORIDE 1000 ML: 9 INJECTION, SOLUTION INTRAVENOUS at 16:22

## 2018-02-08 ASSESSMENT — PAIN DESCRIPTION - PAIN TYPE
TYPE: ACUTE PAIN

## 2018-02-08 ASSESSMENT — PAIN DESCRIPTION - LOCATION
LOCATION: CHEST

## 2018-02-08 ASSESSMENT — PAIN DESCRIPTION - ORIENTATION
ORIENTATION: MID

## 2018-02-08 ASSESSMENT — ENCOUNTER SYMPTOMS
DIARRHEA: 0
COUGH: 1
WHEEZING: 0
VOMITING: 0
STRIDOR: 0
SHORTNESS OF BREATH: 0
CHEST TIGHTNESS: 0
COLOR CHANGE: 0
APNEA: 0
NAUSEA: 0
BACK PAIN: 0

## 2018-02-08 ASSESSMENT — PAIN SCALES - GENERAL
PAINLEVEL_OUTOF10: 7
PAINLEVEL_OUTOF10: 10
PAINLEVEL_OUTOF10: 7
PAINLEVEL_OUTOF10: 10

## 2018-02-08 ASSESSMENT — PAIN DESCRIPTION - FREQUENCY
FREQUENCY: CONTINUOUS

## 2018-02-08 ASSESSMENT — PAIN SCALES - WONG BAKER: WONGBAKER_NUMERICALRESPONSE: 8

## 2018-02-08 NOTE — ED PROVIDER NOTES
Head: Normocephalic and atraumatic. Right Ear: External ear normal.   Left Ear: External ear normal.   Nose: Nose normal.   Mouth/Throat: Oropharynx is clear and moist. No oropharyngeal exudate. Eyes: Conjunctivae and EOM are normal. Pupils are equal, round, and reactive to light. Right eye exhibits no discharge. Left eye exhibits no discharge. No scleral icterus. Neck: Normal range of motion. Neck supple. No JVD present. No tracheal deviation present. No thyromegaly present. Cardiovascular: Normal rate, regular rhythm, S1 normal, S2 normal, normal heart sounds and intact distal pulses. Exam reveals no gallop and no friction rub. No murmur heard. Pulmonary/Chest: Effort normal and breath sounds normal. No stridor. No respiratory distress. She has no decreased breath sounds. She has no wheezes. She has no rhonchi. She has no rales. She exhibits no tenderness. Abdominal: Soft. Normal appearance. She exhibits no distension and no mass. There is tenderness in the epigastric area. There is no rebound and no guarding. No hernia. Musculoskeletal: Normal range of motion. She exhibits no edema or tenderness. Lymphadenopathy:     She has no cervical adenopathy. Neurological: She is alert and oriented to person, place, and time. She has normal reflexes. No cranial nerve deficit. She exhibits normal muscle tone. She displays no seizure activity. Coordination normal. GCS eye subscore is 4. GCS verbal subscore is 5. GCS motor subscore is 6. Skin: Skin is warm and dry. No bruising, no ecchymosis, no lesion and no rash noted. She is not diaphoretic. Psychiatric: She has a normal mood and affect.  Her speech is normal and behavior is normal. Judgment and thought content normal. Cognition and memory are normal.     DIFFERENTIAL DIAGNOSIS:   Gastritis, GERD, cholelithiasis, PUD, ACS, costochondritis    DIAGNOSTIC RESULTS     EKG: All EKG's are interpreted by the Emergency Department Physician who either signs There is no liver mass or intrahepatic biliary dilatation. Gallbladder/Biliary tree: There are multiple gallstones in the gallbladder. No wall thickening. No extrahepatic biliary dilatation. Spleen: There are accessory splenules in the splenic hilar region. . No splenomegaly. Pancreas: Unremarkable. No mass or pancreatic ductal dilatation. Adrenal glands: Unremarkable. No mass. Kidneys and ureters: Unremarkable. No hydroureteronephrosis, mass, or cyst. No renal or ureteral calculi. Stomach, small bowel, and colon: Stomach and duodenum are unremarkable. The colon is redundant. There is a large amount of stool throughout the colon suggestive of constipation. Small bowel and colon are otherwise unremarkable. No bowel wall thickening or bowel obstruction. Appendix: The appendix is not visualized however there are no findings to suggest acute appendicitis. Omentum and mesentery: Unremarkable Aorta, vascular: No aortic aneurysm or dissection. No significant vascular abnormality. Reproductive: Despite the history of hysterectomy, the uterus is present and of appropriate size for age. There is a 1 cm posterior subserosal fibroid. Adnexa are unremarkable. Bladder: Unremarkable. No wall thickening or obvious mass. No stones. Intraperitoneal/retroperitoneal Space: There is no ascites, abscess, adenopathy, or mass. No pneumoperitoneum. Abdominal and pelvic body wall soft tissues: Unremarkable Musculoskeletal structures: There is an old moderate L2 compression fracture. No acute inflammatory or infectious process in the abdomen or pelvis. No evidence of bowel obstruction. Gallstones in the gallbladder. No biliary dilatation. Moderate to large amount of stool throughout the colon consistent with constipation. Hepatomegaly. **This report has been created using voice recognition software. It may contain minor errors which are inherent in voice recognition technology. ** Final report electronically signed by Dr. José Luis Lin on

## 2018-02-09 LAB
ANION GAP SERPL CALCULATED.3IONS-SCNC: 13 MEQ/L (ref 8–16)
ANISOCYTOSIS: ABNORMAL
BASOPHILS # BLD: 0.6 %
BASOPHILS ABSOLUTE: 0 THOU/MM3 (ref 0–0.1)
BUN BLDV-MCNC: 15 MG/DL (ref 7–22)
CALCIUM SERPL-MCNC: 8.9 MG/DL (ref 8.5–10.5)
CHLORIDE BLD-SCNC: 104 MEQ/L (ref 98–111)
CO2: 24 MEQ/L (ref 23–33)
CREAT SERPL-MCNC: 0.8 MG/DL (ref 0.4–1.2)
EOSINOPHIL # BLD: 4.2 %
EOSINOPHILS ABSOLUTE: 0.3 THOU/MM3 (ref 0–0.4)
FLU A ANTIGEN: NEGATIVE
FLU B ANTIGEN: NEGATIVE
GFR SERPL CREATININE-BSD FRML MDRD: 74 ML/MIN/1.73M2
GLUCOSE BLD-MCNC: 99 MG/DL (ref 70–108)
HCT VFR BLD CALC: 36 % (ref 37–47)
HEMOGLOBIN: 11.7 GM/DL (ref 12–16)
INR BLD: 1.04 (ref 0.85–1.13)
LV EF: 58 %
LVEF MODALITY: NORMAL
LYMPHOCYTES # BLD: 15.1 %
LYMPHOCYTES ABSOLUTE: 1 THOU/MM3 (ref 1–4.8)
MCH RBC QN AUTO: 27.3 PG (ref 27–31)
MCHC RBC AUTO-ENTMCNC: 32.5 GM/DL (ref 33–37)
MCV RBC AUTO: 84.1 FL (ref 81–99)
MONOCYTES # BLD: 9.5 %
MONOCYTES ABSOLUTE: 0.6 THOU/MM3 (ref 0.4–1.3)
MRSA SCREEN RT-PCR: NEGATIVE
NUCLEATED RED BLOOD CELLS: 0 /100 WBC
PDW BLD-RTO: 17.3 % (ref 11.5–14.5)
PLATELET # BLD: 203 THOU/MM3 (ref 130–400)
PMV BLD AUTO: 7.6 FL (ref 7.4–10.4)
POTASSIUM REFLEX MAGNESIUM: 4.5 MEQ/L (ref 3.5–5.2)
PROCALCITONIN: 0.04 NG/ML (ref 0.01–0.09)
PROCALCITONIN: 0.04 NG/ML (ref 0.01–0.09)
RBC # BLD: 4.28 MILL/MM3 (ref 4.2–5.4)
SEG NEUTROPHILS: 70.6 %
SEGMENTED NEUTROPHILS ABSOLUTE COUNT: 4.5 THOU/MM3 (ref 1.8–7.7)
SODIUM BLD-SCNC: 141 MEQ/L (ref 135–145)
TROPONIN T: < 0.01 NG/ML
TROPONIN T: < 0.01 NG/ML
TSH SERPL DL<=0.05 MIU/L-ACNC: 4.02 UIU/ML (ref 0.4–4.2)
URINE CULTURE REFLEX: NORMAL
WBC # BLD: 6.4 THOU/MM3 (ref 4.8–10.8)

## 2018-02-09 PROCEDURE — 85610 PROTHROMBIN TIME: CPT

## 2018-02-09 PROCEDURE — 84484 ASSAY OF TROPONIN QUANT: CPT

## 2018-02-09 PROCEDURE — 36415 COLL VENOUS BLD VENIPUNCTURE: CPT

## 2018-02-09 PROCEDURE — 99233 SBSQ HOSP IP/OBS HIGH 50: CPT | Performed by: HOSPITALIST

## 2018-02-09 PROCEDURE — 1200000003 HC TELEMETRY R&B

## 2018-02-09 PROCEDURE — 6370000000 HC RX 637 (ALT 250 FOR IP): Performed by: NURSE PRACTITIONER

## 2018-02-09 PROCEDURE — 99222 1ST HOSP IP/OBS MODERATE 55: CPT | Performed by: INTERNAL MEDICINE

## 2018-02-09 PROCEDURE — 84443 ASSAY THYROID STIM HORMONE: CPT

## 2018-02-09 PROCEDURE — 6360000002 HC RX W HCPCS: Performed by: FAMILY MEDICINE

## 2018-02-09 PROCEDURE — 93306 TTE W/DOPPLER COMPLETE: CPT

## 2018-02-09 PROCEDURE — 84145 PROCALCITONIN (PCT): CPT

## 2018-02-09 PROCEDURE — 87641 MR-STAPH DNA AMP PROBE: CPT

## 2018-02-09 PROCEDURE — 87081 CULTURE SCREEN ONLY: CPT

## 2018-02-09 PROCEDURE — 2500000003 HC RX 250 WO HCPCS: Performed by: FAMILY MEDICINE

## 2018-02-09 PROCEDURE — S0028 INJECTION, FAMOTIDINE, 20 MG: HCPCS | Performed by: FAMILY MEDICINE

## 2018-02-09 PROCEDURE — 87804 INFLUENZA ASSAY W/OPTIC: CPT

## 2018-02-09 PROCEDURE — 2580000003 HC RX 258: Performed by: FAMILY MEDICINE

## 2018-02-09 PROCEDURE — 85025 COMPLETE CBC W/AUTO DIFF WBC: CPT

## 2018-02-09 PROCEDURE — 6370000000 HC RX 637 (ALT 250 FOR IP): Performed by: HOSPITALIST

## 2018-02-09 PROCEDURE — 6370000000 HC RX 637 (ALT 250 FOR IP): Performed by: FAMILY MEDICINE

## 2018-02-09 PROCEDURE — 80048 BASIC METABOLIC PNL TOTAL CA: CPT

## 2018-02-09 RX ORDER — DIVALPROEX SODIUM 500 MG/1
500 TABLET, EXTENDED RELEASE ORAL NIGHTLY
Status: DISCONTINUED | OUTPATIENT
Start: 2018-02-09 | End: 2018-02-11 | Stop reason: HOSPADM

## 2018-02-09 RX ORDER — LEVOTHYROXINE SODIUM 0.1 MG/1
100 TABLET ORAL DAILY
Status: DISCONTINUED | OUTPATIENT
Start: 2018-02-09 | End: 2018-02-11 | Stop reason: HOSPADM

## 2018-02-09 RX ORDER — UREA 10 %
5 LOTION (ML) TOPICAL NIGHTLY
Status: DISCONTINUED | OUTPATIENT
Start: 2018-02-09 | End: 2018-02-11 | Stop reason: HOSPADM

## 2018-02-09 RX ORDER — SODIUM CHLORIDE 0.9 % (FLUSH) 0.9 %
10 SYRINGE (ML) INJECTION PRN
Status: DISCONTINUED | OUTPATIENT
Start: 2018-02-09 | End: 2018-02-11 | Stop reason: HOSPADM

## 2018-02-09 RX ORDER — PRAVASTATIN SODIUM 40 MG
40 TABLET ORAL NIGHTLY
Status: DISCONTINUED | OUTPATIENT
Start: 2018-02-09 | End: 2018-02-11 | Stop reason: HOSPADM

## 2018-02-09 RX ORDER — GUAIFENESIN 100 MG/5ML
10 SOLUTION ORAL EVERY 4 HOURS PRN
Status: DISCONTINUED | OUTPATIENT
Start: 2018-02-09 | End: 2018-02-11 | Stop reason: HOSPADM

## 2018-02-09 RX ORDER — SODIUM CHLORIDE 0.9 % (FLUSH) 0.9 %
10 SYRINGE (ML) INJECTION EVERY 12 HOURS SCHEDULED
Status: DISCONTINUED | OUTPATIENT
Start: 2018-02-09 | End: 2018-02-11 | Stop reason: HOSPADM

## 2018-02-09 RX ORDER — ASPIRIN 81 MG/1
81 TABLET ORAL DAILY
Status: DISCONTINUED | OUTPATIENT
Start: 2018-02-09 | End: 2018-02-11 | Stop reason: HOSPADM

## 2018-02-09 RX ORDER — FLUDROCORTISONE ACETATE 0.1 MG/1
0.1 TABLET ORAL DAILY
Status: DISCONTINUED | OUTPATIENT
Start: 2018-02-09 | End: 2018-02-11 | Stop reason: HOSPADM

## 2018-02-09 RX ORDER — LITHIUM CARBONATE 300 MG/1
300 CAPSULE ORAL
Status: DISCONTINUED | OUTPATIENT
Start: 2018-02-09 | End: 2018-02-09

## 2018-02-09 RX ORDER — ANTACID TABLETS 500 MG/1
500 TABLET, CHEWABLE ORAL 4 TIMES DAILY PRN
COMMUNITY
End: 2019-06-18

## 2018-02-09 RX ORDER — FUROSEMIDE 20 MG/1
20 TABLET ORAL DAILY
Status: DISCONTINUED | OUTPATIENT
Start: 2018-02-09 | End: 2018-02-11 | Stop reason: HOSPADM

## 2018-02-09 RX ORDER — AZITHROMYCIN 250 MG/1
250 TABLET, FILM COATED ORAL NIGHTLY
Status: DISCONTINUED | OUTPATIENT
Start: 2018-02-09 | End: 2018-02-09

## 2018-02-09 RX ORDER — CALCIUM CARBONATE 200(500)MG
500 TABLET,CHEWABLE ORAL 4 TIMES DAILY PRN
Status: DISCONTINUED | OUTPATIENT
Start: 2018-02-09 | End: 2018-02-11 | Stop reason: HOSPADM

## 2018-02-09 RX ORDER — LITHIUM CARBONATE 300 MG/1
300 CAPSULE ORAL DAILY
Status: DISCONTINUED | OUTPATIENT
Start: 2018-02-10 | End: 2018-02-11 | Stop reason: HOSPADM

## 2018-02-09 RX ORDER — ALBUTEROL SULFATE 2.5 MG/3ML
2.5 SOLUTION RESPIRATORY (INHALATION) EVERY 6 HOURS PRN
Status: DISCONTINUED | OUTPATIENT
Start: 2018-02-09 | End: 2018-02-11 | Stop reason: HOSPADM

## 2018-02-09 RX ORDER — BISACODYL 10 MG
10 SUPPOSITORY, RECTAL RECTAL PRN
Status: DISCONTINUED | OUTPATIENT
Start: 2018-02-09 | End: 2018-02-11 | Stop reason: HOSPADM

## 2018-02-09 RX ORDER — ONDANSETRON 2 MG/ML
4 INJECTION INTRAMUSCULAR; INTRAVENOUS EVERY 6 HOURS PRN
Status: DISCONTINUED | OUTPATIENT
Start: 2018-02-09 | End: 2018-02-11 | Stop reason: HOSPADM

## 2018-02-09 RX ORDER — CLOZAPINE 25 MG/1
50 TABLET ORAL DAILY
Status: DISCONTINUED | OUTPATIENT
Start: 2018-02-09 | End: 2018-02-11 | Stop reason: HOSPADM

## 2018-02-09 RX ORDER — ACETAMINOPHEN 325 MG/1
650 TABLET ORAL EVERY 4 HOURS PRN
Status: DISCONTINUED | OUTPATIENT
Start: 2018-02-09 | End: 2018-02-11 | Stop reason: HOSPADM

## 2018-02-09 RX ORDER — POLYETHYLENE GLYCOL 3350 17 G/17G
17 POWDER, FOR SOLUTION ORAL DAILY
Status: DISCONTINUED | OUTPATIENT
Start: 2018-02-09 | End: 2018-02-11 | Stop reason: HOSPADM

## 2018-02-09 RX ORDER — PANTOPRAZOLE SODIUM 40 MG/1
40 TABLET, DELAYED RELEASE ORAL
Status: DISCONTINUED | OUTPATIENT
Start: 2018-02-10 | End: 2018-02-11 | Stop reason: HOSPADM

## 2018-02-09 RX ADMIN — PRAVASTATIN SODIUM 40 MG: 40 TABLET ORAL at 22:23

## 2018-02-09 RX ADMIN — DIVALPROEX SODIUM 500 MG: 500 TABLET, FILM COATED, EXTENDED RELEASE ORAL at 22:23

## 2018-02-09 RX ADMIN — Medication 5 MG: at 22:23

## 2018-02-09 RX ADMIN — FAMOTIDINE 20 MG: 10 INJECTION, SOLUTION INTRAVENOUS at 10:08

## 2018-02-09 RX ADMIN — ASPIRIN 81 MG: 81 TABLET, COATED ORAL at 12:30

## 2018-02-09 RX ADMIN — ONDANSETRON 4 MG: 2 INJECTION INTRAMUSCULAR; INTRAVENOUS at 22:16

## 2018-02-09 RX ADMIN — POLYETHYLENE GLYCOL 3350 17 G: 17 POWDER, FOR SOLUTION ORAL at 18:56

## 2018-02-09 RX ADMIN — ACETAMINOPHEN 650 MG: 325 TABLET ORAL at 12:36

## 2018-02-09 RX ADMIN — CLOZAPINE 50 MG: 25 TABLET ORAL at 12:30

## 2018-02-09 RX ADMIN — LITHIUM CARBONATE 300 MG: 300 CAPSULE ORAL at 12:30

## 2018-02-09 RX ADMIN — LEVOTHYROXINE SODIUM 100 MCG: 100 TABLET ORAL at 12:30

## 2018-02-09 RX ADMIN — FAMOTIDINE 20 MG: 10 INJECTION, SOLUTION INTRAVENOUS at 00:42

## 2018-02-09 RX ADMIN — Medication 10 ML: at 10:10

## 2018-02-09 RX ADMIN — Medication 10 ML: at 22:25

## 2018-02-09 RX ADMIN — FUROSEMIDE 20 MG: 20 TABLET ORAL at 12:30

## 2018-02-09 RX ADMIN — FLUDROCORTISONE ACETATE 0.1 MG: 0.1 TABLET ORAL at 12:30

## 2018-02-09 RX ADMIN — OXYBUTYNIN CHLORIDE 15 MG: 10 TABLET, FILM COATED, EXTENDED RELEASE ORAL at 12:30

## 2018-02-09 ASSESSMENT — PAIN DESCRIPTION - ORIENTATION
ORIENTATION: MID
ORIENTATION: MID

## 2018-02-09 ASSESSMENT — PAIN SCALES - GENERAL
PAINLEVEL_OUTOF10: 0
PAINLEVEL_OUTOF10: 1
PAINLEVEL_OUTOF10: 7

## 2018-02-09 ASSESSMENT — PAIN DESCRIPTION - ONSET: ONSET: ON-GOING

## 2018-02-09 ASSESSMENT — PAIN DESCRIPTION - LOCATION
LOCATION: CHEST
LOCATION: CHEST
LOCATION_2: HEAD

## 2018-02-09 ASSESSMENT — PAIN DESCRIPTION - FREQUENCY: FREQUENCY: CONTINUOUS

## 2018-02-09 ASSESSMENT — PAIN SCALES - WONG BAKER
WONGBAKER_NUMERICALRESPONSE: 0
WONGBAKER_NUMERICALRESPONSE: 6

## 2018-02-09 ASSESSMENT — PAIN DESCRIPTION - PAIN TYPE: TYPE: ACUTE PAIN

## 2018-02-09 ASSESSMENT — PAIN DESCRIPTION - DESCRIPTORS: DESCRIPTORS: ACHING

## 2018-02-09 NOTE — CARE COORDINATION
DISCHARGE BARRIERS  2/9/18, 9:58 AM    Reason for Referral:  \"from Group Home\"  Mental Status:  Alert but is developmentally delayed  Decision Making:  Has a guardian, Russ Boeck, Donita Began [phone]. Family/Social/Home Environment:  SAIRA spoke to Michael De Paz, direct care staff, who was visiting her from the group home, stating that she recently moved to a new home. The patient lives with another client in a one story home with basement that she does not use. When they are home, there is always staff to assist in her needs. She is able to do some tasks. They help her into a bathtub shower which they are changing to a walk-in. There are grab bars. They assist with this as needed. They help with ambulation when she is out of the home by using a gait belt. Current Services: All provided by [facility]  Current Equipment:  See above  Payment Source: Medicare and Medicaid  Concerns or Barriers to Discharge:  SAIRA spoke with Malcom Merritt with [employer], guardian for patient. She states she has a new provided (not [employer]) but was not certain which one as she was \"in the field\" working and not in the office. SAIRA was given the name and number ([phone]) of Saran Tim at the local board of DD, message was left for her  Collabrative List of ECF/HH were provided: not at this time    Teach Back Method used with staff regarding care plan and needs  Patient's direct care staff verbalize understanding of the plan of care and contribute to goal setting.        Anticipated Needs/Discharge Plan:  Messages were left for the CM at the local DD regarding final plan for discharge, likely return to the group home    Electronically signed by RASHMI Martino on 2/9/2018 at 9:58 AM

## 2018-02-09 NOTE — H&P
 HYSTERECTOMY         Home Medications:   No current facility-administered medications on file prior to encounter. Current Outpatient Prescriptions on File Prior to Encounter   Medication Sig Dispense Refill    furosemide (LASIX) 20 MG tablet Take 1 tablet by mouth daily for 7 days 7 tablet 0    guaiFENesin-dextromethorphan (ROBITUSSIN DM) 100-10 MG/5ML syrup Take 5 mLs by mouth 3 times daily as needed for Cough 120 mL 0    lithium 300 MG capsule Take 300 mg by mouth 3 times daily (with meals)      cloZAPine (CLOZARIL) 100 MG tablet Take 50 mg by mouth daily      amoxicillin-clavulanate (AUGMENTIN) 875-125 MG per tablet Take 1 tablet by mouth 2 times daily      guaiFENesin (ROBITUSSIN) 100 MG/5ML SOLN oral solution Take 10 mLs by mouth every 4 hours as needed for Cough 473 mL 0    docusate sodium (COLACE) 100 MG capsule Take 100 mg by mouth daily      omeprazole (PRILOSEC) 20 MG delayed release capsule Take 20 mg by mouth daily      pravastatin (PRAVACHOL) 40 MG tablet Take 40 mg by mouth nightly      melatonin 3 MG TABS tablet Take 5 mg by mouth daily       oxybutynin (DITROPAN XL) 15 MG CR tablet Take 15 mg by mouth daily      divalproex (DEPAKOTE) 500 MG ER tablet Take 1 tablet by mouth nightly. 30 tablet     magnesium hydroxide (MILK OF MAGNESIA) 400 MG/5ML suspension Take 30 mLs by mouth daily as needed for Constipation.  calcium-vitamin D (OSCAL-500) 500-200 MG-UNIT per tablet Take 1 tablet by mouth 3 times daily. 30 tablet     calcitonin, salmon, (MIACALCIN) 200 UNIT/ACT nasal spray 1 spray by Nasal route daily. 1 Bottle     aspirin 81 MG EC tablet Take 1 tablet by mouth daily. 30 tablet 0    acetaminophen (TYLENOL) 325 MG tablet Take 650 mg by mouth every 4 hours as needed for Pain or Fever.  calcium carbonate (TUMS) 500 MG chewable tablet Take 1 tablet by mouth 4 times daily as needed for Heartburn.  Indications: after meals and brdtime prn      loperamide (IMODIUM) 2 MG capsule Take 2 mg by mouth as needed for Diarrhea.  polyethylene glycol (GLYCOLAX) powder Take 17 g by mouth as needed       chlorhexidine (PERIDEX) 0.12 % solution Take 15 mLs by mouth 2 times daily.  fludrocortisone (FLORINEF) 0.1 MG tablet Take 0.1 mg by mouth daily.  levothyroxine (SYNTHROID) 125 MCG tablet Take 100 mcg by mouth Daily       therapeutic multivitamin-minerals (THERAGRAN-M) tablet Take 1 tablet by mouth daily. Allergies:  Nsaids    Social History:    reports that she has never smoked. She has never used smokeless tobacco. She reports that she does not drink alcohol or use drugs. Family History:       Problem Relation Age of Onset    Cancer Mother        Review of systems: Unable due to excessive drowsiness         Vitals:   Vitals:    02/08/18 2124   BP:    Pulse: 60   Resp: 15   Temp:    SpO2: 97%      BMI: Body mass index is 32.62 kg/m².                 Exam:  Physical Examination: General appearance - somnolent, well appearing, and in no distress  Mental status - somnolent, oriented to person, place, and time  Neck - supple, no significant adenopathy, no JVD, or carotid bruits  Chest - clear to auscultation, no wheezes, rales or rhonchi, symmetric air entry  Heart - normal rate, regular rhythm, normal S1, S2, no murmurs, rubs, clicks or gallops  Abdomen - soft, nontender, nondistended, no masses or organomegaly  Neurological - somnolent, no focal findings or movement disorder noted  Musculoskeletal - no joint tenderness, deformity or swelling  Extremities - peripheral pulses normal, no pedal edema, no clubbing or cyanosis  Skin - normal coloration and turgor, no rashes, no suspicious skin lesions noted      Review of Labs and Diagnostic Testing:    Recent Results (from the past 24 hour(s))   EKG 12 Lead    Collection Time: 02/08/18  3:47 PM   Result Value Ref Range    Ventricular Rate 85 BPM    Atrial Rate 85 BPM    P-R Interval 134 ms    QRS Duration 88 ms    Q-T seen. However, there are multiple remote appearing healed posterior lateral left-sided rib fractures demonstrated. 1. Cholelithiasis. 2. No other acute intra-abdominal or pelvic abnormalities seen. **This report has been created using voice recognition software. It may contain minor errors which are inherent in voice recognition technology. ** Final report electronically signed by Dr. Robson Ceballos on 2/8/2018 6:48 PM        EKG: Normal sinus rhythm      Assessment / Plan:    1. Pneumonia, LLL- Rocephin, Zithromax, duonebs prn, sputum culture, blood cultures, rapid influenza screen, UA, procal, CT chest (-) PE  2. Hypothyroidism- TSH  3. Bipolar 1 disorder ([facility] [address] treated  4. Pericardial effusion- trend troponin, EKG, 2 d echo, BNP, lipaseCardio consult  5. Chest pain, r/o acs- trend troponin, EKG, 2 d echo, CXR, CTA chest (-) PE  6. Abdominal pain- amylase, lipase, LFTs,CT abdomen pelvis, GI consult        Dispo: Admit, IV antibiotics, await pending labs, Cardio, GI consults. Hospitalist service will follow.         DVT prophylaxis: [] Lovenox                                 [x] SCDs                                 [] SQ Heparin                                 [] Encourage ambulation, low risk for DVT, no chemical or mechanical prophylaxis necessary              [] Already on Anticoagulation                Anticipated Disposition upon discharge: [] Home                                                                         [x] Home with Home Health                                                                         [] Benji Welch                                                                         [] [address]          Electronically signed by Uche Vallecillo DO on 2/8/2018 at 10:39 PM

## 2018-02-09 NOTE — FLOWSHEET NOTE
02/09/18 1758   Provider Notification   Reason for Communication Evaluate;New orders   Provider Name Dr. Levie Sicard   Provider Notification Physician   Method of Communication Call   Response See orders   Notification Time [id number]   caregiver americo stated it was very important for patient to take home does of psych meds and lithium.  MD Lala order to change lithium 300 mg TID to daily since this is what her home dose is

## 2018-02-09 NOTE — PROGRESS NOTES
cardiomegaly. **This report has been created using voice recognition software. It may contain minor errors which are inherent in voice recognition technology. **      Final report electronically signed by Dr. [place] on 2/8/2018 4:51 PM                  Electronically signed by Jabari Hair MD on 2/9/2018 at 2:31 PM

## 2018-02-09 NOTE — CONSULTS
by mouth 3 times daily. 6/3/14  Yes Ritchie Umanzor MD   calcitonin, salmon, (MIACALCIN) 200 UNIT/ACT nasal spray 1 spray by Nasal route daily. 6/3/14  Yes Ritchie Umanzor MD   aspirin 81 MG EC tablet Take 1 tablet by mouth daily. 2/18/14  Yes Jyotsna Miller MD   acetaminophen (TYLENOL) 325 MG tablet Take 650 mg by mouth every 4 hours as needed for Pain or Fever. Yes Historical Provider, MD   polyethylene glycol (GLYCOLAX) powder Take 17 g by mouth as needed    Yes Historical Provider, MD   chlorhexidine (PERIDEX) 0.12 % solution Take 15 mLs by mouth 2 times daily. Yes Historical Provider, MD   fludrocortisone (FLORINEF) 0.1 MG tablet Take 0.1 mg by mouth daily. Yes Historical Provider, MD   levothyroxine (SYNTHROID) 125 MCG tablet Take 100 mcg by mouth Daily    Yes Historical Provider, MD   therapeutic multivitamin-minerals (THERAGRAN-M) tablet Take 1 tablet by mouth daily. Yes Historical Provider, MD   furosemide (LASIX) 20 MG tablet Take 1 tablet by mouth daily for 7 days 2/7/18 2/14/18  Ortega Cedillo DO   guaiFENesin-dextromethorphan [facility] DM) 100-10 MG/5ML syrup Take 5 mLs by mouth 3 times daily as needed for Cough 2/7/18 2/17/18  Ortega Cedillo DO   guaiFENesin (ROBITUSSIN) 100 MG/5ML SOLN oral solution Take 10 mLs by mouth every 4 hours as needed for Cough 3/29/17   Dayne Dexter DO   loperamide (IMODIUM) 2 MG capsule Take 2 mg by mouth as needed for Diarrhea.     Historical Provider, MD       Current Meds:  Current Facility-Administered Medications:     albuterol (PROVENTIL) nebulizer solution 2.5 mg, 2.5 mg, Nebulization, Q6H PRN, Melissa Montyeagbala, DO    sodium chloride flush 0.9 % injection 10 mL, 10 mL, Intravenous, 2 times per day, Melissa Udeagbala, DO, 10 mL at 02/09/18 1010    sodium chloride flush 0.9 % injection 10 mL, 10 mL, Intravenous, PRN, Melissa Udeagbala, DO    acetaminophen (TYLENOL) tablet 650 mg, 650 mg, Oral, Q4H PRN, Melissa Udeaaimeeala, DO, 650 mg nourished, obese and mentally delayed  female. Pt is lying comfortably in bed. HEENT: Atraumatic, Pupils reactive, No pallor/icterus. Oral mucosa moist/No thrush  Neck: No thyroid enlargement, No cervical or supraclavicular lymphaedenopathy  CVS: Regular rate and rhythm, No murmurs. No rubs or gallops  RS: Good b/l air entry, Clear to auscultation b/l  Abd: soft, RUQ tenderness to deep palpation, (-) Brand's, non-distended, no visible veins, scars, No hepatosplenomegaly or palpable masses, bowel sounds active. Ext: No clubbing, cyanosis, edema  CNS: alert, oriented, no gross focal motor deficits    Labs:   Lab Results   Component Value Date    WBC 6.4 02/09/2018    HGB 11.7 02/09/2018    HCT 36.0 02/09/2018    MCV 84.1 02/09/2018     02/09/2018     Lab Results   Component Value Date     02/09/2018    K 4.5 02/09/2018     02/09/2018    CO2 24 02/09/2018    BUN 15 02/09/2018    CREATININE 0.8 02/09/2018    GLUCOSE 99 02/09/2018    CALCIUM 8.9 02/09/2018     Lab Results   Component Value Date    ALKPHOS 69 02/08/2018    ALT 9 02/08/2018    AST 14 02/08/2018    PROT 7.6 02/08/2018    BILITOT 0.3 02/08/2018    BILIDIR <0.2 02/06/2018    LABALBU 3.5 02/08/2018     Lab Results   Component Value Date    LACTA 1.5 03/24/2017     Lab Results   Component Value Date    AMYLASE 130 02/08/2018     Lab Results   Component Value Date    LIPASE 27.8 02/08/2018     Lab Results   Component Value Date    INR 1.04 02/09/2018       Radiology:  PROCEDURE: CT ABDOMEN PELVIS W IV CONTRAST  2/8/2018   CLINICAL INFORMATION: epigastric pain, elevated lipase    COMPARISON: 2/6/2018   TECHNIQUE:  Axial CT images were obtained through the abdomen and pelvis following the intravenous and demonstration of 80 mL Isovue 370 contrast. Coronal and sagittal reformatted images were rendered.  All CT scans at this facility use dose modulation, iterative reconstruction, and/or weight-based dosing when appropriate to reduce Alex Liz MD and Dr Candelaria Schultz MD for allowing me to participate in the care of this patient. Assessment and POC were discussed with Dr Carla Mullen.     ARNALDO Cazares  2/9/2018  5:21 PM     Patient is seen independently from the nurse practitioner and all  the pertinent data along with physical examination and assessment and plans are all obtained by my self and  Laboratory data, Radiology results, medications all are reviewed by my self and care is discussed extensively with the patient  and the patients nurse and all agree with plan and in addition see orders and plans    Electronically signed by Gabriela Hyatt MD on 2/9/2018

## 2018-02-09 NOTE — CONSULTS
[facility]  Cardiology Consult      Patient:  Antonio De  YOB: 1960    MRN: [id number]   Acct: [de-identified]     Primary Care Physician: Amy Michele MD        REASON FOR CONSULT: Peric. Effusion                    All labs, EKG's, diagnostic testing and images as well as cardiac cath, stress testing   were reviewed during this encounter        Reproducible lower sternal/upper epigastric symptom. PREVIOUS CARDIAC TESTING    Ekg:     Echo:    Str.  Test:    Cath:      Past Medical History:    Past Medical History:   Diagnosis Date    Arthritis     [facility] disorder ([facility] [address]     [facility]     Chronic kidney disease     Coarse tremors     Constipation     COPD (chronic obstructive pulmonary disease) ([facility] [address]     Depression     Gait difficulty     General weakness     GERD (gastroesophageal reflux disease)     Hypertension     Mental retardation     Pneumonia     Seizures ([facility] [address]     Thyroid disease     UTI (lower urinary tract infection)        Past Surgical History:    Past Surgical History:   Procedure Laterality Date    HYSTERECTOMY         Medications Prior to Admission:    Prescriptions Prior to Admission: lithium 300 MG capsule, Take 300 mg by mouth 3 times daily (with meals)  cloZAPine (CLOZARIL) 100 MG tablet, Take 50 mg by mouth daily  amoxicillin-clavulanate (AUGMENTIN) 875-125 MG per tablet, Take 1 tablet by mouth 2 times daily  docusate sodium (COLACE) 100 MG capsule, Take 100 mg by mouth daily  omeprazole (PRILOSEC) 20 MG delayed release capsule, Take 20 mg by mouth daily  pravastatin (PRAVACHOL) 40 MG tablet, Take 40 mg by mouth nightly  melatonin 3 MG TABS tablet, Take 5 mg by mouth daily   oxybutynin (DITROPAN XL) 15 MG CR tablet, Take 15 mg by mouth daily  divalproex (DEPAKOTE) 500 MG ER tablet, Take 1 tablet by mouth nightly.  magnesium hydroxide (MILK OF MAGNESIA) 400 MG/5ML suspension, Take 30 mLs by mouth daily as needed for Constipation. calcium-vitamin D (OSCAL-500) 500-200 MG-UNIT per tablet, Take 1 tablet by mouth 3 times daily. calcitonin, salmon, (MIACALCIN) 200 UNIT/ACT nasal spray, 1 spray by Nasal route daily. aspirin 81 MG EC tablet, Take 1 tablet by mouth daily. acetaminophen (TYLENOL) 325 MG tablet, Take 650 mg by mouth every 4 hours as needed for Pain or Fever. polyethylene glycol (GLYCOLAX) powder, Take 17 g by mouth as needed   chlorhexidine (PERIDEX) 0.12 % solution, Take 15 mLs by mouth 2 times daily. fludrocortisone (FLORINEF) 0.1 MG tablet, Take 0.1 mg by mouth daily. levothyroxine (SYNTHROID) 125 MCG tablet, Take 100 mcg by mouth Daily   therapeutic multivitamin-minerals (THERAGRAN-M) tablet, Take 1 tablet by mouth daily. furosemide (LASIX) 20 MG tablet, Take 1 tablet by mouth daily for 7 days  guaiFENesin-dextromethorphan (ROBITUSSIN DM) 100-10 MG/5ML syrup, Take 5 mLs by mouth 3 times daily as needed for Cough  guaiFENesin (ROBITUSSIN) 100 MG/5ML SOLN oral solution, Take 10 mLs by mouth every 4 hours as needed for Cough  calcium carbonate (TUMS) 500 MG chewable tablet, Take 1 tablet by mouth 4 times daily as needed for Heartburn. Indications: after meals and brdtime prn  loperamide (IMODIUM) 2 MG capsule, Take 2 mg by mouth as needed for Diarrhea. Allergies:    Nsaids    Social History:    reports that she has never smoked. She has never used smokeless tobacco. She reports that she does not drink alcohol or use drugs. Family History:   family history includes Cancer in her mother. REVIEW OF SYSTEMS:    Constitutional: negative for anorexia, chills and fevers,weight change  Respiratory: negative for cough, dyspnea on exertion, hemoptysis, shortness of breath and wheezing  Cardiovascular: negative for chest pain, orthopnea, palpitations and syncope  Gastrointestinal: negative for abdominal pain,nausea , vomiting, constipation, diarrhea.   Hematologic/lymphatic: negative for

## 2018-02-10 LAB
INR BLD: 1.05 (ref 0.85–1.13)
ORGANISM: ABNORMAL
PROCALCITONIN: 0.04 NG/ML (ref 0.01–0.09)
THROAT/NOSE CULTURE: NORMAL
URINE CULTURE REFLEX: ABNORMAL

## 2018-02-10 PROCEDURE — 0DB68ZX EXCISION OF STOMACH, VIA NATURAL OR ARTIFICIAL OPENING ENDOSCOPIC, DIAGNOSTIC: ICD-10-PCS | Performed by: INTERNAL MEDICINE

## 2018-02-10 PROCEDURE — 99232 SBSQ HOSP IP/OBS MODERATE 35: CPT | Performed by: HOSPITALIST

## 2018-02-10 PROCEDURE — 84145 PROCALCITONIN (PCT): CPT

## 2018-02-10 PROCEDURE — 6370000000 HC RX 637 (ALT 250 FOR IP): Performed by: FAMILY MEDICINE

## 2018-02-10 PROCEDURE — 1200000003 HC TELEMETRY R&B

## 2018-02-10 PROCEDURE — 36415 COLL VENOUS BLD VENIPUNCTURE: CPT

## 2018-02-10 PROCEDURE — 88305 TISSUE EXAM BY PATHOLOGIST: CPT

## 2018-02-10 PROCEDURE — 2580000003 HC RX 258: Performed by: FAMILY MEDICINE

## 2018-02-10 PROCEDURE — 2500000003 HC RX 250 WO HCPCS

## 2018-02-10 PROCEDURE — 6360000002 HC RX W HCPCS: Performed by: INTERNAL MEDICINE

## 2018-02-10 PROCEDURE — 3609012400 HC EGD TRANSORAL BIOPSY SINGLE/MULTIPLE: Performed by: INTERNAL MEDICINE

## 2018-02-10 PROCEDURE — 6370000000 HC RX 637 (ALT 250 FOR IP): Performed by: INTERNAL MEDICINE

## 2018-02-10 PROCEDURE — 85610 PROTHROMBIN TIME: CPT

## 2018-02-10 PROCEDURE — 6370000000 HC RX 637 (ALT 250 FOR IP): Performed by: HOSPITALIST

## 2018-02-10 PROCEDURE — 99152 MOD SED SAME PHYS/QHP 5/>YRS: CPT | Performed by: INTERNAL MEDICINE

## 2018-02-10 RX ORDER — FENTANYL CITRATE 50 UG/ML
INJECTION, SOLUTION INTRAMUSCULAR; INTRAVENOUS PRN
Status: DISCONTINUED | OUTPATIENT
Start: 2018-02-10 | End: 2018-02-10 | Stop reason: HOSPADM

## 2018-02-10 RX ORDER — MIDAZOLAM HYDROCHLORIDE 1 MG/ML
INJECTION INTRAMUSCULAR; INTRAVENOUS PRN
Status: DISCONTINUED | OUTPATIENT
Start: 2018-02-10 | End: 2018-02-10 | Stop reason: HOSPADM

## 2018-02-10 RX ADMIN — FLUDROCORTISONE ACETATE 0.1 MG: 0.1 TABLET ORAL at 08:49

## 2018-02-10 RX ADMIN — PANTOPRAZOLE SODIUM 40 MG: 40 TABLET, DELAYED RELEASE ORAL at 05:20

## 2018-02-10 RX ADMIN — LEVOTHYROXINE SODIUM 100 MCG: 100 TABLET ORAL at 05:20

## 2018-02-10 RX ADMIN — DIVALPROEX SODIUM 500 MG: 500 TABLET, FILM COATED, EXTENDED RELEASE ORAL at 21:37

## 2018-02-10 RX ADMIN — CLOZAPINE 50 MG: 25 TABLET ORAL at 08:49

## 2018-02-10 RX ADMIN — Medication 10 ML: at 08:48

## 2018-02-10 RX ADMIN — Medication 5 MG: at 21:37

## 2018-02-10 RX ADMIN — ACETAMINOPHEN 650 MG: 325 TABLET ORAL at 08:52

## 2018-02-10 RX ADMIN — OXYBUTYNIN CHLORIDE 15 MG: 10 TABLET, FILM COATED, EXTENDED RELEASE ORAL at 08:49

## 2018-02-10 RX ADMIN — Medication 10 ML: at 21:38

## 2018-02-10 RX ADMIN — PRAVASTATIN SODIUM 40 MG: 40 TABLET ORAL at 21:37

## 2018-02-10 RX ADMIN — FUROSEMIDE 20 MG: 20 TABLET ORAL at 08:50

## 2018-02-10 RX ADMIN — LITHIUM CARBONATE 300 MG: 300 CAPSULE ORAL at 08:49

## 2018-02-10 ASSESSMENT — PAIN SCALES - GENERAL
PAINLEVEL_OUTOF10: 0
PAINLEVEL_OUTOF10: 2

## 2018-02-10 ASSESSMENT — PAIN SCALES - WONG BAKER: WONGBAKER_NUMERICALRESPONSE: 6

## 2018-02-10 ASSESSMENT — PAIN DESCRIPTION - ORIENTATION: ORIENTATION: UPPER;MID

## 2018-02-10 ASSESSMENT — PAIN DESCRIPTION - LOCATION: LOCATION: ABDOMEN

## 2018-02-10 NOTE — PROGRESS NOTES
Second head to toe assessment completed. No changes noted. Call light in reach. Bedside table in reach. Bed alarm on.

## 2018-02-10 NOTE — PROGRESS NOTES
Visitor in room with patient. Bed alarm on. Ambulated patient to bathroom and returned to bed. Denies pain at this time. Bed alarm on, call light and bedside table in reach.

## 2018-02-10 NOTE — PROGRESS NOTES
virus vaccine  0.5 mL Intramuscular Once    aspirin  81 mg Oral Daily    cloZAPine  50 mg Oral Daily    divalproex  500 mg Oral Nightly    fludrocortisone  0.1 mg Oral Daily    furosemide  20 mg Oral Daily    levothyroxine  100 mcg Oral Daily    pantoprazole  40 mg Oral QAM AC    oxybutynin  15 mg Oral Daily    pravastatin  40 mg Oral Nightly    melatonin  5 mg Oral Nightly    polyethylene glycol  17 g Oral Daily    lithium  300 mg Oral Daily     PRN Meds: albuterol, sodium chloride flush, acetaminophen, ondansetron, calcium carbonate, guaiFENesin, bisacodyl      Intake/Output Summary (Last 24 hours) at 02/10/18 1229  Last data filed at 02/10/18 0518   Gross per 24 hour   Intake              810 ml   Output             3500 ml   Net            -2690 ml       Diet:  Diet NPO, After Midnight    Exam:  /61   Pulse 67   Temp 97.8 °F (36.6 °C) (Oral)   Resp 18   Ht 5' 5\" (1.651 m)   Wt 192 lb 1.6 oz (87.1 kg)   SpO2 99%   BMI 31.97 kg/m²     uncahgned  Gen: Not in distress. Alert. Poor insight   Head: Normocephalic. Atraumatic. Eyes: Conjunctivae/corneas clear. ENT: Oral mucosa moist, dental deformity  Neck: No JVD. No obvious thyromegaly. CVS: Nml S1S2, no MRG, RRR  Pulmomary: Clear bilaterally. No crackles. No wheezes. Gastrointestinal: Soft, non tender, non distend,  Positive bowel sounds. Musculoskeletal: No edema. Warm  Neuro: No focal deficit. Moves extremity spontaneously. Psychiatry: Appropriate affect. Not agitated.           Labs:   Recent Labs      02/08/18   1618  02/09/18   0514   WBC  7.7  6.4   HGB  12.2  11.7*   HCT  37.5  36.0*   PLT  240  203     Recent Labs      02/08/18   1618  02/09/18   0514   NA  139  141   K  5.2  4.5   CL  99  104   CO2  29  24   BUN  17  15   CREATININE  0.7  0.8   CALCIUM  9.5  8.9     Recent Labs      02/08/18   1618   AST  14   ALT  9*   BILITOT  0.3   ALKPHOS  69     Recent Labs      02/09/18   0514  02/10/18   0540   INR  1.04  1.05     No signed by Delon Gomez MD on 2/10/2018 at 12:29 PM

## 2018-02-10 NOTE — PRE SEDATION
[facility]  Sedation/Analgesia History & Physical    Patient: Kiana Narvaez: 1960  Med Rec#: [id number] Acc#: [id number]   Provider Performing Procedure: Que Mcintyre  Primary Care Physician: Amy Michele MD    PRE-PROCEDURE   Full CODE [x]Yes  DNR-CCA/DNR-CC []Yes   Brief History/Pre-Procedure Diagnosis:GERd and abdominal pain           MEDICAL HISTORY  []CAD/Valve  []Liver Disease  []Lung Disease []Diabetes  []Hypertension []Renal Disease  []Additional information:       has a past medical history of Arthritis; Bipolar disorder ([facility] [address] CHF (congestive heart failure) ([facility] [address] Chronic kidney disease; Coarse tremors; Constipation; COPD (chronic obstructive pulmonary disease) ([facility] [address] Depression; Gait difficulty; General weakness; GERD (gastroesophageal reflux disease); Hypertension; Mental retardation; Pneumonia; Seizures ([facility] [address] Thyroid disease; and UTI (lower urinary tract infection). SURGICAL HISTORY   has a past surgical history that includes Hysterectomy.   Additional information:       ALLERGIES   Allergies as of 02/08/2018 - Review Complete 02/08/2018   Allergen Reaction Noted    Nsaids  03/24/2017     Additional information:       MEDICATIONS   Coumadin Use Last 7 Days [x]No []Yes  Antiplatelet drug therapy use last 7 days  [x]No []Yes  Other anticoagulant use last 7 days  [x]No []Yes    Current Facility-Administered Medications:     albuterol (PROVENTIL) nebulizer solution 2.5 mg, 2.5 mg, Nebulization, Q6H PRN, Melissa Udeagbala, DO    sodium chloride flush 0.9 % injection 10 mL, 10 mL, Intravenous, 2 times per day, Melissa Udeagbala, DO, 10 mL at 02/10/18 0848    sodium chloride flush 0.9 % injection 10 mL, 10 mL, Intravenous, PRN, Melissa Udeagbala, DO    acetaminophen (TYLENOL) tablet 650 mg, 650 mg, Oral, Q4H PRN, Melissa Udeagbala, DO, 650 mg at 02/10/18 0852    ondansetron (ZOFRAN) injection 4 mg, 4 mg, Intravenous, Q6H PRN, Melissa Udeagbala, DO, 4 mg at

## 2018-02-11 VITALS
WEIGHT: 194.3 LBS | RESPIRATION RATE: 16 BRPM | OXYGEN SATURATION: 98 % | TEMPERATURE: 98.2 F | HEART RATE: 69 BPM | BODY MASS INDEX: 32.37 KG/M2 | SYSTOLIC BLOOD PRESSURE: 121 MMHG | HEIGHT: 65 IN | DIASTOLIC BLOOD PRESSURE: 56 MMHG

## 2018-02-11 LAB
INR BLD: 1.03 (ref 0.85–1.13)
MRSA SCREEN: NORMAL
VRE CULTURE: NORMAL

## 2018-02-11 PROCEDURE — 6370000000 HC RX 637 (ALT 250 FOR IP): Performed by: HOSPITALIST

## 2018-02-11 PROCEDURE — 6370000000 HC RX 637 (ALT 250 FOR IP): Performed by: NURSE PRACTITIONER

## 2018-02-11 PROCEDURE — 99231 SBSQ HOSP IP/OBS SF/LOW 25: CPT | Performed by: PHYSICIAN ASSISTANT

## 2018-02-11 PROCEDURE — 2580000003 HC RX 258: Performed by: FAMILY MEDICINE

## 2018-02-11 PROCEDURE — 36415 COLL VENOUS BLD VENIPUNCTURE: CPT

## 2018-02-11 PROCEDURE — 85610 PROTHROMBIN TIME: CPT

## 2018-02-11 PROCEDURE — 99239 HOSP IP/OBS DSCHRG MGMT >30: CPT | Performed by: HOSPITALIST

## 2018-02-11 RX ORDER — PANTOPRAZOLE SODIUM 40 MG/1
40 TABLET, DELAYED RELEASE ORAL
Qty: 30 TABLET | Refills: 3 | Status: SHIPPED | OUTPATIENT
Start: 2018-02-12 | End: 2021-07-01

## 2018-02-11 RX ADMIN — FLUDROCORTISONE ACETATE 0.1 MG: 0.1 TABLET ORAL at 08:01

## 2018-02-11 RX ADMIN — CLOZAPINE 50 MG: 25 TABLET ORAL at 08:01

## 2018-02-11 RX ADMIN — LITHIUM CARBONATE 300 MG: 300 CAPSULE ORAL at 08:01

## 2018-02-11 RX ADMIN — PANTOPRAZOLE SODIUM 40 MG: 40 TABLET, DELAYED RELEASE ORAL at 07:58

## 2018-02-11 RX ADMIN — Medication 10 ML: at 08:02

## 2018-02-11 RX ADMIN — FUROSEMIDE 20 MG: 20 TABLET ORAL at 08:01

## 2018-02-11 RX ADMIN — OXYBUTYNIN CHLORIDE 15 MG: 10 TABLET, FILM COATED, EXTENDED RELEASE ORAL at 08:01

## 2018-02-11 RX ADMIN — ASPIRIN 81 MG: 81 TABLET, COATED ORAL at 08:01

## 2018-02-11 RX ADMIN — POLYETHYLENE GLYCOL 3350 17 G: 17 POWDER, FOR SOLUTION ORAL at 08:01

## 2018-02-11 RX ADMIN — LEVOTHYROXINE SODIUM 100 MCG: 100 TABLET ORAL at 07:58

## 2018-02-11 ASSESSMENT — PAIN SCALES - GENERAL: PAINLEVEL_OUTOF10: 0

## 2018-02-11 NOTE — PROGRESS NOTES
[facility] services at [phone], (this is number on front of chart that was provided with patient ) spoke to Rhoda castanon ok to discharge patient back to group home. Called home Number of [phone] to arrange transportation. I called this number and spoke to Prince Lange. Prince Lange states that this group no longer over sees her care. [address] home at [phone], spoke to Michael De Paz one of the care givers. She will send someone to  patient with clean clothes. [facility] returned call. Yaneth Canada from group Ethan will  patient. Contact number for Juan Miguel [phone] from [address].

## 2018-02-11 NOTE — DISCHARGE SUMMARY
Follow-up visits:   MD Jonathan Stephenson  [address] [address]  [phone]      staff-please s/u w/i 7d    Tj Butt MD  [address] [place] [address]  [phone]      follow up in 2 to 10 weeks     ARNALDO Paige  [facility] [address]  [phone]      please follow up in 2 weeks         Discharge Medications:      Lisa Mcgee   [place] Medication Instructions OKU:[id number]    Printed on:02/11/18 1411   Medication Information                      acetaminophen (TYLENOL) 325 MG tablet  Take 650 mg by mouth every 4 hours as needed for Pain or Fever. aspirin 81 MG EC tablet  Take 1 tablet by mouth daily. calcitonin, salmon, (MIACALCIN) 200 UNIT/ACT nasal spray  1 spray by Nasal route daily. Calcium Carbonate 500 MG CHEW  Take 500 mg by mouth 4 times daily as needed             calcium-vitamin D (OSCAL-500) 500-200 MG-UNIT per tablet  Take 1 tablet by mouth 3 times daily. chlorhexidine (PERIDEX) 0.12 % solution  Take 15 mLs by mouth 2 times daily. cloZAPine (CLOZARIL) 100 MG tablet  Take 75 mg by mouth daily              divalproex (DEPAKOTE) 500 MG ER tablet  Take 1 tablet by mouth nightly. docusate sodium (COLACE) 100 MG capsule  Take 100 mg by mouth daily             fludrocortisone (FLORINEF) 0.1 MG tablet  Take 0.1 mg by mouth daily.              furosemide (LASIX) 20 MG tablet  Take 1 tablet by mouth daily for 7 days             guaiFENesin (ROBITUSSIN) 100 MG/5ML SOLN oral solution  Take 10 mLs by mouth every 4 hours as needed for Cough             guaiFENesin-dextromethorphan (ROBITUSSIN DM) 100-10 MG/5ML syrup  Take 5 mLs by mouth 3 times daily as needed for Cough             levothyroxine (SYNTHROID) 125 MCG tablet  Take 100 mcg by mouth Daily              lithium 300 MG capsule  Take 300 mg by mouth nightly              loperamide (IMODIUM) 2 MG capsule  Take 2 mg by mouth as needed for Diarrhea.             magnesium hydroxide (MILK OF MAGNESIA) 400 MG/5ML suspension  Take 30 mLs by mouth daily as needed for Constipation. melatonin 3 MG TABS tablet  Take 5 mg by mouth daily              oxybutynin (DITROPAN XL) 15 MG CR tablet  Take 15 mg by mouth daily             pantoprazole (PROTONIX) 40 MG tablet  Take 1 tablet by mouth every morning (before breakfast)             polyethylene glycol (GLYCOLAX) powder  Take 17 g by mouth as needed              pravastatin (PRAVACHOL) 40 MG tablet  Take 40 mg by mouth nightly             therapeutic multivitamin-minerals (THERAGRAN-M) tablet  Take 1 tablet by mouth daily. Time Spent on discharge is 35 in the examination, evaluation, counseling and review of medications and discharge plan. Signed: Thank you Richar Morrison MD for the opportunity to be involved in this patient's care. This dictation was generated by voice recognition computer software.  Although all attempts are made to edit the dictation for accuracy, there may be errors in the transcription that are not intended    Electronically signed by Yared Kohler MD on 2/11/2018 at 2:11 PM

## 2018-02-11 NOTE — PROGRESS NOTES
Gastroenterology  Progress Note    2/11/2018 10:30 AM  Subjective:   Admit Date: 2/8/2018    Interval History: nausea, no vomiting, mild abdominal pain , no dysphagia , finding very mild, Bx is pending, HIDA Monday or as out patient   Diet: DIET DENTAL SOFT;    Medications:   Scheduled Meds:   sodium chloride flush  10 mL Intravenous 2 times per day    influenza virus vaccine  0.5 mL Intramuscular Once    aspirin  81 mg Oral Daily    cloZAPine  50 mg Oral Daily    divalproex  500 mg Oral Nightly    fludrocortisone  0.1 mg Oral Daily    furosemide  20 mg Oral Daily    levothyroxine  100 mcg Oral Daily    pantoprazole  40 mg Oral QAM AC    oxybutynin  15 mg Oral Daily    pravastatin  40 mg Oral Nightly    melatonin  5 mg Oral Nightly    polyethylene glycol  17 g Oral Daily    lithium  300 mg Oral Daily     Continuous Infusions:   CBC:   Recent Labs      02/08/18   1618  02/09/18   0514   WBC  7.7  6.4   HGB  12.2  11.7*   PLT  240  203     BMP:  Recent Labs      02/08/18   1618  02/09/18   0514   NA  139  141   K  5.2  4.5   CL  99  104   CO2  29  24   BUN  17  15   CREATININE  0.7  0.8   GLUCOSE  98  99     Hepatic: Recent Labs      02/08/18   1618   AST  14   ALT  9*   BILITOT  0.3   ALKPHOS  69     INR:   Recent Labs      02/09/18   0514  02/10/18   0540  02/11/18   0557   INR  1.04  1.05  1.03     Xray:       . Cholelithiasis.   2. No other acute intra-abdominal or pelvic abnormalities seen.           Endoscopy Finding:     Mild gastritis     Objective:   Vitals: BP (!) 121/56   Pulse 69   Temp 98.2 °F (36.8 °C) (Oral)   Resp 16   Ht 5' 5\" (1.651 m)   Wt 194 lb 4.8 oz (88.1 kg)   SpO2 98%   BMI 32.33 kg/m²     Intake/Output Summary (Last 24 hours) at 02/11/18 1030  Last data filed at 02/10/18 2245   Gross per 24 hour   Intake             1210 ml   Output             1350 ml   Net             -140 ml     General appearance: alert and cooperative with exam  Lungs: clear to auscultation

## 2018-02-11 NOTE — PROGRESS NOTES
[facility] scan order faxed to central scheduling with patient face sheet. Message left for central scheduling to call patient.

## 2018-02-13 ENCOUNTER — TELEPHONE (OUTPATIENT)
Dept: ADMINISTRATIVE | Age: 58
End: 2018-02-13

## 2018-02-14 LAB
BLOOD CULTURE, ROUTINE: NORMAL
BLOOD CULTURE, ROUTINE: NORMAL

## 2018-03-05 ENCOUNTER — HOSPITAL ENCOUNTER (OUTPATIENT)
Dept: NUCLEAR MEDICINE | Age: 58
Discharge: HOME | End: 2018-03-05
Payer: MEDICARE

## 2018-03-05 VITALS — BODY MASS INDEX: 32.49 KG/M2 | WEIGHT: 195 LBS | HEIGHT: 65 IN

## 2018-03-05 DIAGNOSIS — K80.20 CALCULUS OF GALLBLADDER WITHOUT CHOLECYSTITIS WITHOUT OBSTRUCTION: ICD-10-CM

## 2018-03-05 DIAGNOSIS — R10.811 RIGHT UPPER QUADRANT ABDOMINAL TENDERNESS WITHOUT REBOUND TENDERNESS: ICD-10-CM

## 2018-03-05 PROCEDURE — 78227 HEPATOBIL SYST IMAGE W/DRUG: CPT

## 2018-03-05 PROCEDURE — 3430000000 HC RX DIAGNOSTIC RADIOPHARMACEUTICAL: Performed by: NURSE PRACTITIONER

## 2018-03-05 PROCEDURE — A9537 TC99M MEBROFENIN: HCPCS | Performed by: NURSE PRACTITIONER

## 2018-03-05 PROCEDURE — 6360000004 HC RX CONTRAST MEDICATION: Performed by: NURSE PRACTITIONER

## 2018-03-05 PROCEDURE — 2580000003 HC RX 258: Performed by: NURSE PRACTITIONER

## 2018-03-05 RX ADMIN — SODIUM CHLORIDE 1.77 MCG: 9 INJECTION, SOLUTION INTRAVENOUS at 12:30

## 2018-03-05 RX ADMIN — Medication 6.8 MILLICURIE: at 11:30

## 2018-03-27 ENCOUNTER — OFFICE VISIT (OUTPATIENT)
Dept: CARDIOLOGY CLINIC | Age: 58
End: 2018-03-27
Payer: MEDICARE

## 2018-03-27 VITALS
HEIGHT: 66 IN | BODY MASS INDEX: 29.73 KG/M2 | WEIGHT: 185 LBS | HEART RATE: 72 BPM | DIASTOLIC BLOOD PRESSURE: 62 MMHG | SYSTOLIC BLOOD PRESSURE: 136 MMHG

## 2018-03-27 DIAGNOSIS — I31.39 PERICARDIAL EFFUSION: Primary | ICD-10-CM

## 2018-03-27 PROCEDURE — G8427 DOCREV CUR MEDS BY ELIG CLIN: HCPCS | Performed by: INTERNAL MEDICINE

## 2018-03-27 PROCEDURE — G8417 CALC BMI ABV UP PARAM F/U: HCPCS | Performed by: INTERNAL MEDICINE

## 2018-03-27 PROCEDURE — 3017F COLORECTAL CA SCREEN DOC REV: CPT | Performed by: INTERNAL MEDICINE

## 2018-03-27 PROCEDURE — 3014F SCREEN MAMMO DOC REV: CPT | Performed by: INTERNAL MEDICINE

## 2018-03-27 PROCEDURE — G8484 FLU IMMUNIZE NO ADMIN: HCPCS | Performed by: INTERNAL MEDICINE

## 2018-03-27 PROCEDURE — G8598 ASA/ANTIPLAT THER USED: HCPCS | Performed by: INTERNAL MEDICINE

## 2018-03-27 PROCEDURE — 1036F TOBACCO NON-USER: CPT | Performed by: INTERNAL MEDICINE

## 2018-03-27 PROCEDURE — 99214 OFFICE O/P EST MOD 30 MIN: CPT | Performed by: INTERNAL MEDICINE

## 2018-03-27 NOTE — PROGRESS NOTES
Patient has 24/7 care. Patient here to follow-up on being in the hospital for chest pain. Patient denies having any chest pain. She does have SOB with exertion. She has intermittent dizziness at times. She denies having any palpitations or NIMISHA.

## 2018-03-27 NOTE — PROGRESS NOTES
[facility]  [facility]  [address]  [address]  [address] [address]  Dept: [phone]  Dept Fax: [phone]  Loc: [phone]    Visit Date: 3/27/2018    Ms. Taylor Lowe is a 61 y.o. female  who presented for:  Chief Complaint   Patient presents with    Establish Cardiologist    Chest Pain    Follow-Up from Hospital       HPI:   HPI   The patient is accompanied by a caregiver. She was recently in hospital for noncardiac issues. At that time her echocardiogram showed a small pericardial effusion without evidence of tamponade. Her left ventricular systolic function was normal.  She is referred here continued evaluation of small asymptomatic pericardial effusion. According to the caregiver there is no complaint of shortness of breath or lower extremity swelling. Patient's blood pressure is adequate. Current Outpatient Prescriptions:     polyethylene glycol (GLYCOLAX) powder, Colonoscopy Prep Dispense 255 Gram Bottle.   Use as Directed, Disp: 255 g, Rfl: 0    polyethylene glycol (MIRALAX) powder, Take 17 g by mouth daily, Disp: 510 g, Rfl: 3    pantoprazole (PROTONIX) 40 MG tablet, Take 1 tablet by mouth every morning (before breakfast), Disp: 30 tablet, Rfl: 3    Calcium Carbonate 500 MG CHEW, Take 500 mg by mouth 4 times daily as needed, Disp: , Rfl:     lithium 300 MG capsule, Take 300 mg by mouth nightly , Disp: , Rfl:     cloZAPine (CLOZARIL) 100 MG tablet, Take 75 mg by mouth daily , Disp: , Rfl:     guaiFENesin (ROBITUSSIN) 100 MG/5ML SOLN oral solution, Take 10 mLs by mouth every 4 hours as needed for Cough, Disp: 473 mL, Rfl: 0    docusate sodium (COLACE) 100 MG capsule, Take 100 mg by mouth daily, Disp: , Rfl:     pravastatin (PRAVACHOL) 40 MG tablet, Take 40 mg by mouth nightly, Disp: , Rfl:     melatonin 3 MG TABS tablet, Take 5 mg by mouth daily , Disp: , Rfl:     oxybutynin (DITROPAN XL) 15 MG CR tablet, Take 15 mg by mouth daily, Disp: , Rfl:    divalproex (DEPAKOTE) 500 MG ER tablet, Take 1 tablet by mouth nightly., Disp: 30 tablet, Rfl:     magnesium hydroxide (MILK OF MAGNESIA) 400 MG/5ML suspension, Take 30 mLs by mouth daily as needed for Constipation. , Disp: , Rfl:     calcium-vitamin D (OSCAL-500) 500-200 MG-UNIT per tablet, Take 1 tablet by mouth 3 times daily. , Disp: 30 tablet, Rfl:     calcitonin, salmon, (MIACALCIN) 200 UNIT/ACT nasal spray, 1 spray by Nasal route daily. , Disp: 1 Bottle, Rfl:     aspirin 81 MG EC tablet, Take 1 tablet by mouth daily. , Disp: 30 tablet, Rfl: 0    acetaminophen (TYLENOL) 325 MG tablet, Take 650 mg by mouth every 4 hours as needed for Pain or Fever., Disp: , Rfl:     loperamide (IMODIUM) 2 MG capsule, Take 2 mg by mouth as needed for Diarrhea., Disp: , Rfl:     polyethylene glycol (GLYCOLAX) powder, Take 17 g by mouth as needed , Disp: , Rfl:     chlorhexidine (PERIDEX) 0.12 % solution, Take 15 mLs by mouth 2 times daily. , Disp: , Rfl:     fludrocortisone (FLORINEF) 0.1 MG tablet, Take 0.1 mg by mouth daily. , Disp: , Rfl:     levothyroxine (SYNTHROID) 125 MCG tablet, Take 100 mcg by mouth Daily , Disp: , Rfl:     therapeutic multivitamin-minerals (THERAGRAN-M) tablet, Take 1 tablet by mouth daily. , Disp: , Rfl:     Past Medical History  Brenda Cortes  has a past medical history of Arthritis; Bipolar disorder ([address] CHF (congestive heart failure) ([address] Chronic kidney disease; Coarse tremors; Constipation; COPD (chronic obstructive pulmonary disease) ([address] Depression; Gait difficulty; General weakness; GERD (gastroesophageal reflux disease); Hypertension; Mental retardation; Pneumonia; Seizures ([address] Thyroid disease; and UTI (lower urinary tract infection). Social History  Brenda Cortes  reports that she has never smoked. She has never used smokeless tobacco. She reports that she does not drink alcohol or use drugs.     Past Surgical History   Past Surgical History:   Procedure Laterality Date    HYSTERECTOMY  MN EGD TRANSORAL BIOPSY SINGLE/MULTIPLE Left 2/10/2018    EGD BIOPSY performed by Torie Nassar MD at [address]  02/08/2018    DR. [facility]       Subjective:     Review of Systems  Constitutional: No weight loss, fever, night sweats. Cardiovascular: Negative for chest pain, claudication, cyanosis, exertional chest pain or pressure, irregular heart beat, lower extremity edema, orthopnea, paroxysmal nocturnal dyspnea and varicose veins. Neck: Negative for JVD, carotid bruit, and swelling of neck. Trachea central.    Respiratory: Negative for cough, dyspnea on exertion, emphysema, hemoptysis, pleurisy/chest pain, sputum and wheezing. Limb: Negative for cyanosis, clubbing, edema, and focal power deficit. Objective:     BP (!) 144/62   Pulse 72   Ht 5' 6\" (1.676 m)   Wt 185 lb (83.9 kg)   BMI 29.86 kg/m²     Wt Readings from Last 3 Encounters:   03/27/18 185 lb (83.9 kg)   03/05/18 195 lb (88.5 kg)   02/27/18 192 lb (87.1 kg)     BP Readings from Last 3 Encounters:   03/27/18 (!) 144/62   02/27/18 (!) 140/82   02/11/18 (!) 121/56       Physical Exam  General Appearance: Patient is alert, well appearing, and in no distress. Mental status: Alert, oriented to person, place, and time. Answers questions appropriately. Neck:  Supple, no significant adenopathy, no JVD, or carotid bruits. Chest:  Clear to auscultation, no wheezes, rales or rhonchi, symmetric air entry bilaterally. Heart:  Normal rate, regular rhythm, normal S1, S2, no murmurs, rubs, clicks or gallops. Abdomen:  Soft, nontender,  no masses ,pulsation or organomegaly. Neurological: Alert, oriented, normal speech, no focal findings or movement disorder noted. Musculoskeletal:  No joint tenderness, deformity or swelling. Extremities:  Peripheral pulses normal, no pedal edema, no clubbing or cyanosis. Skin: Normal coloration and turgor, no rashes, no suspicious skin lesions noted.

## 2018-04-04 ENCOUNTER — HOSPITAL ENCOUNTER (OUTPATIENT)
Dept: NON INVASIVE DIAGNOSTICS | Age: 58
Discharge: HOME | End: 2018-04-04
Payer: MEDICARE

## 2018-04-04 ENCOUNTER — HOSPITAL ENCOUNTER (OUTPATIENT)
Age: 58
Discharge: HOME | End: 2018-04-04
Payer: MEDICARE

## 2018-04-04 LAB
ANISOCYTOSIS: ABNORMAL
BASOPHILS # BLD: 0.4 %
BASOPHILS ABSOLUTE: 0 THOU/MM3 (ref 0–0.1)
EOSINOPHIL # BLD: 4.3 %
EOSINOPHILS ABSOLUTE: 0.3 THOU/MM3 (ref 0–0.4)
HCT VFR BLD CALC: 38.5 % (ref 37–47)
HEMOGLOBIN: 12.5 GM/DL (ref 12–16)
HYPOCHROMIA: ABNORMAL
LYMPHOCYTES # BLD: 17.4 %
LYMPHOCYTES ABSOLUTE: 1 THOU/MM3 (ref 1–4.8)
MCH RBC QN AUTO: 26.6 PG (ref 27–31)
MCHC RBC AUTO-ENTMCNC: 32.4 GM/DL (ref 33–37)
MCV RBC AUTO: 82.1 FL (ref 81–99)
MONOCYTES # BLD: 8.5 %
MONOCYTES ABSOLUTE: 0.5 THOU/MM3 (ref 0.4–1.3)
NUCLEATED RED BLOOD CELLS: 0 /100 WBC
PDW BLD-RTO: 17.2 % (ref 11.5–14.5)
PLATELET # BLD: 197 THOU/MM3 (ref 130–400)
PMV BLD AUTO: 7.8 FL (ref 7.4–10.4)
RBC # BLD: 4.69 MILL/MM3 (ref 4.2–5.4)
SEG NEUTROPHILS: 69.4 %
SEGMENTED NEUTROPHILS ABSOLUTE COUNT: 4.1 THOU/MM3 (ref 1.8–7.7)
WBC # BLD: 5.9 THOU/MM3 (ref 4.8–10.8)

## 2018-04-04 PROCEDURE — 93307 TTE W/O DOPPLER COMPLETE: CPT

## 2018-04-04 PROCEDURE — 85025 COMPLETE CBC W/AUTO DIFF WBC: CPT

## 2018-04-04 PROCEDURE — 36415 COLL VENOUS BLD VENIPUNCTURE: CPT

## 2018-04-24 ENCOUNTER — OFFICE VISIT (OUTPATIENT)
Dept: CARDIOLOGY CLINIC | Age: 58
End: 2018-04-24
Payer: MEDICARE

## 2018-04-24 VITALS
WEIGHT: 193.4 LBS | SYSTOLIC BLOOD PRESSURE: 124 MMHG | DIASTOLIC BLOOD PRESSURE: 73 MMHG | HEIGHT: 66 IN | HEART RATE: 73 BPM | BODY MASS INDEX: 31.08 KG/M2

## 2018-04-24 DIAGNOSIS — I31.39 PERICARDIAL EFFUSION: Primary | ICD-10-CM

## 2018-04-24 PROCEDURE — 1036F TOBACCO NON-USER: CPT | Performed by: INTERNAL MEDICINE

## 2018-04-24 PROCEDURE — 3017F COLORECTAL CA SCREEN DOC REV: CPT | Performed by: INTERNAL MEDICINE

## 2018-04-24 PROCEDURE — 99213 OFFICE O/P EST LOW 20 MIN: CPT | Performed by: INTERNAL MEDICINE

## 2018-04-24 PROCEDURE — G8427 DOCREV CUR MEDS BY ELIG CLIN: HCPCS | Performed by: INTERNAL MEDICINE

## 2018-04-24 PROCEDURE — G8598 ASA/ANTIPLAT THER USED: HCPCS | Performed by: INTERNAL MEDICINE

## 2018-04-24 PROCEDURE — G8417 CALC BMI ABV UP PARAM F/U: HCPCS | Performed by: INTERNAL MEDICINE

## 2018-07-30 ENCOUNTER — HOSPITAL ENCOUNTER (OUTPATIENT)
Dept: WOMENS IMAGING | Age: 58
Discharge: HOME | End: 2018-07-30
Payer: MEDICARE

## 2018-07-30 DIAGNOSIS — Z12.31 VISIT FOR SCREENING MAMMOGRAM: ICD-10-CM

## 2018-07-30 PROCEDURE — 77063 BREAST TOMOSYNTHESIS BI: CPT

## 2018-08-09 ENCOUNTER — HOSPITAL ENCOUNTER (EMERGENCY)
Age: 58
Discharge: HOME | End: 2018-08-09
Payer: MEDICARE

## 2018-08-09 ENCOUNTER — APPOINTMENT (OUTPATIENT)
Dept: GENERAL RADIOLOGY | Age: 58
End: 2018-08-09
Payer: MEDICARE

## 2018-08-09 VITALS
RESPIRATION RATE: 15 BRPM | HEART RATE: 68 BPM | SYSTOLIC BLOOD PRESSURE: 110 MMHG | OXYGEN SATURATION: 98 % | TEMPERATURE: 97.7 F | WEIGHT: 190 LBS | BODY MASS INDEX: 30.53 KG/M2 | DIASTOLIC BLOOD PRESSURE: 68 MMHG | HEIGHT: 66 IN

## 2018-08-09 DIAGNOSIS — S80.02XA CONTUSION OF LEFT KNEE, INITIAL ENCOUNTER: Primary | ICD-10-CM

## 2018-08-09 PROCEDURE — 73564 X-RAY EXAM KNEE 4 OR MORE: CPT

## 2018-08-09 PROCEDURE — 99283 EMERGENCY DEPT VISIT LOW MDM: CPT

## 2018-08-09 PROCEDURE — 2709999900 HC NON-CHARGEABLE SUPPLY

## 2018-08-09 ASSESSMENT — ENCOUNTER SYMPTOMS
ABDOMINAL PAIN: 0
SHORTNESS OF BREATH: 0
COLOR CHANGE: 0
COUGH: 0
BACK PAIN: 0

## 2018-08-09 NOTE — ED PROVIDER NOTES
Discharge Medication List as of 8/9/2018  2:09 PM      CONTINUE these medications which have NOT CHANGED    Details   Wheat Dextrin (BENEFIBER ON THE GO) PACK Take 1 packet by mouth daily With 8 oz of water, Disp-30 each, R-11Normal      pantoprazole (PROTONIX) 40 MG tablet Take 1 tablet by mouth every morning (before breakfast), Disp-30 tablet, R-3Normal      Calcium Carbonate 500 MG CHEW Take 500 mg by mouth 4 times daily as neededHistorical Med      lithium 300 MG capsule Take 300 mg by mouth nightly Historical Med      cloZAPine (CLOZARIL) 100 MG tablet Take 75 mg by mouth daily Historical Med      guaiFENesin (ROBITUSSIN) 100 MG/5ML SOLN oral solution Take 10 mLs by mouth every 4 hours as needed for Cough, Disp-473 mL, R-0Print      docusate sodium (COLACE) 100 MG capsule Take 100 mg by mouth dailyHistorical Med      pravastatin (PRAVACHOL) 40 MG tablet Take 40 mg by mouth nightlyHistorical Med      melatonin 3 MG TABS tablet Take 5 mg by mouth daily Historical Med      oxybutynin (DITROPAN XL) 15 MG CR tablet Take 15 mg by mouth daily      divalproex (DEPAKOTE) 500 MG ER tablet Take 1 tablet by mouth nightly., Disp-30 tablet      magnesium hydroxide (MILK OF MAGNESIA) 400 MG/5ML suspension Take 30 mLs by mouth daily as needed for Constipation. calcium-vitamin D (OSCAL-500) 500-200 MG-UNIT per tablet Take 1 tablet by mouth 3 times daily. , Disp-30 tablet      calcitonin, salmon, (MIACALCIN) 200 UNIT/ACT nasal spray 1 spray by Nasal route daily. , Disp-1 Bottle      aspirin 81 MG EC tablet Take 1 tablet by mouth daily. , Disp-30 tablet, R-0      acetaminophen (TYLENOL) 325 MG tablet Take 650 mg by mouth every 4 hours as needed for Pain or Fever. loperamide (IMODIUM) 2 MG capsule Take 2 mg by mouth as needed for Diarrhea.      polyethylene glycol (GLYCOLAX) powder Take 17 g by mouth as needed Historical Med      chlorhexidine (PERIDEX) 0.12 % solution Take 15 mLs by mouth 2 times daily. fludrocortisone (FLORINEF) 0.1 MG tablet Take 0.1 mg by mouth daily. levothyroxine (SYNTHROID) 125 MCG tablet Take 100 mcg by mouth Daily       therapeutic multivitamin-minerals (THERAGRAN-M) tablet Take 1 tablet by mouth daily. ALLERGIES     is allergic to nsaids. FAMILY HISTORY     indicated that her mother is alive. family history includes Cancer in her mother. SOCIAL HISTORY      reports that she has never smoked. She has never used smokeless tobacco. She reports that she does not drink alcohol or use drugs. PHYSICAL EXAM     INITIAL VITALS:  height is 5' 6\" (1.676 m) and weight is 190 lb (86.2 kg). Her oral temperature is 97.7 °F (36.5 °C). Her blood pressure is 110/68 and her pulse is 68. Her respiration is 15 and oxygen saturation is 98%. Physical Exam   Constitutional: She appears well-developed and well-nourished. No distress. HENT:   Head: Normocephalic. Eyes: Pupils are equal, round, and reactive to light. Neck: Normal range of motion. Cardiovascular: Normal rate. Pulmonary/Chest: Effort normal.   Abdominal: Soft. Musculoskeletal:        Left knee: She exhibits swelling and ecchymosis. She exhibits normal range of motion, no effusion, no deformity, no laceration and no erythema. Legs:      DIFFERENTIAL DIAGNOSIS:   Knee contusion, patellar fracture, knee effusion    DIAGNOSTIC RESULTS     EKG: All EKG's are interpreted by the Emergency Department Physician who either signs or Co-signs this chart in the absence of a cardiologist.    None    RADIOLOGY: non-plain film images(s) such as CT, Ultrasound and MRI are read by the radiologist.    XR KNEE LEFT (MIN 4 VIEWS)   Final Result    No acute fracture or dislocation. **This report has been created using voice recognition software. It may contain minor errors which are inherent in voice recognition technology. **      Final report electronically signed by Dr. Faizan Gutierrez on 8/9/2018 1:58 PM

## 2018-12-04 ENCOUNTER — OFFICE VISIT (OUTPATIENT)
Dept: CARDIOLOGY CLINIC | Age: 58
End: 2018-12-04
Payer: MEDICARE

## 2018-12-04 VITALS
DIASTOLIC BLOOD PRESSURE: 82 MMHG | HEIGHT: 66 IN | HEART RATE: 60 BPM | SYSTOLIC BLOOD PRESSURE: 144 MMHG | WEIGHT: 186.5 LBS | BODY MASS INDEX: 29.97 KG/M2

## 2018-12-04 DIAGNOSIS — R06.02 SOB (SHORTNESS OF BREATH): Primary | ICD-10-CM

## 2018-12-04 PROCEDURE — 1036F TOBACCO NON-USER: CPT | Performed by: INTERNAL MEDICINE

## 2018-12-04 PROCEDURE — G8417 CALC BMI ABV UP PARAM F/U: HCPCS | Performed by: INTERNAL MEDICINE

## 2018-12-04 PROCEDURE — G8598 ASA/ANTIPLAT THER USED: HCPCS | Performed by: INTERNAL MEDICINE

## 2018-12-04 PROCEDURE — G8484 FLU IMMUNIZE NO ADMIN: HCPCS | Performed by: INTERNAL MEDICINE

## 2018-12-04 PROCEDURE — 3017F COLORECTAL CA SCREEN DOC REV: CPT | Performed by: INTERNAL MEDICINE

## 2018-12-04 PROCEDURE — G8427 DOCREV CUR MEDS BY ELIG CLIN: HCPCS | Performed by: INTERNAL MEDICINE

## 2018-12-04 PROCEDURE — 99213 OFFICE O/P EST LOW 20 MIN: CPT | Performed by: INTERNAL MEDICINE

## 2018-12-04 RX ORDER — MECLIZINE HYDROCHLORIDE 25 MG/1
25 TABLET ORAL 4 TIMES DAILY PRN
COMMUNITY
End: 2019-04-22

## 2018-12-04 RX ORDER — BENZONATATE 100 MG/1
100 CAPSULE ORAL 3 TIMES DAILY PRN
COMMUNITY
End: 2019-04-22

## 2018-12-04 RX ORDER — ONDANSETRON 4 MG/1
4 TABLET, FILM COATED ORAL DAILY PRN
COMMUNITY
End: 2019-04-22

## 2018-12-04 RX ORDER — HYDROXYZINE HYDROCHLORIDE 25 MG/1
25 TABLET, FILM COATED ORAL 3 TIMES DAILY PRN
COMMUNITY
End: 2019-04-22

## 2018-12-04 NOTE — PROGRESS NOTES
Pt here for 3 mo check up     Pt denies chest pain, heart palpitations, dizziness, peripheral edema     Pt continues with sob on exertion ,

## 2019-02-18 ENCOUNTER — HOSPITAL ENCOUNTER (OUTPATIENT)
Dept: NON INVASIVE DIAGNOSTICS | Age: 59
Discharge: HOME | End: 2019-02-18
Payer: MEDICARE

## 2019-02-18 LAB
LV EF: 60 %
LVEF MODALITY: NORMAL

## 2019-02-18 PROCEDURE — 93306 TTE W/DOPPLER COMPLETE: CPT

## 2019-02-26 ENCOUNTER — OFFICE VISIT (OUTPATIENT)
Dept: CARDIOLOGY CLINIC | Age: 59
End: 2019-02-26
Payer: MEDICARE

## 2019-02-26 VITALS
WEIGHT: 186 LBS | BODY MASS INDEX: 30.02 KG/M2 | DIASTOLIC BLOOD PRESSURE: 72 MMHG | HEART RATE: 76 BPM | SYSTOLIC BLOOD PRESSURE: 138 MMHG

## 2019-02-26 DIAGNOSIS — I31.39 PERICARDIAL EFFUSION: Primary | ICD-10-CM

## 2019-02-26 PROCEDURE — G8599 NO ASA/ANTIPLAT THER USE RNG: HCPCS | Performed by: INTERNAL MEDICINE

## 2019-02-26 PROCEDURE — G8417 CALC BMI ABV UP PARAM F/U: HCPCS | Performed by: INTERNAL MEDICINE

## 2019-02-26 PROCEDURE — 1036F TOBACCO NON-USER: CPT | Performed by: INTERNAL MEDICINE

## 2019-02-26 PROCEDURE — 3017F COLORECTAL CA SCREEN DOC REV: CPT | Performed by: INTERNAL MEDICINE

## 2019-02-26 PROCEDURE — G8427 DOCREV CUR MEDS BY ELIG CLIN: HCPCS | Performed by: INTERNAL MEDICINE

## 2019-02-26 PROCEDURE — 99213 OFFICE O/P EST LOW 20 MIN: CPT | Performed by: INTERNAL MEDICINE

## 2019-02-26 PROCEDURE — G8484 FLU IMMUNIZE NO ADMIN: HCPCS | Performed by: INTERNAL MEDICINE

## 2019-04-22 ENCOUNTER — HOSPITAL ENCOUNTER (EMERGENCY)
Age: 59
Discharge: HOME | End: 2019-04-22
Payer: MEDICARE

## 2019-04-22 VITALS
WEIGHT: 180 LBS | OXYGEN SATURATION: 95 % | HEIGHT: 64 IN | RESPIRATION RATE: 16 BRPM | TEMPERATURE: 98.1 F | DIASTOLIC BLOOD PRESSURE: 97 MMHG | BODY MASS INDEX: 30.73 KG/M2 | SYSTOLIC BLOOD PRESSURE: 139 MMHG | HEART RATE: 65 BPM

## 2019-04-22 DIAGNOSIS — N30.00 ACUTE CYSTITIS WITHOUT HEMATURIA: Primary | ICD-10-CM

## 2019-04-22 LAB
BACTERIA: ABNORMAL /HPF
BILIRUBIN URINE: ABNORMAL
BLOOD, URINE: NEGATIVE
CASTS 2: ABNORMAL /LPF
CASTS UA: ABNORMAL /LPF
CHARACTER, URINE: ABNORMAL
COLOR: YELLOW
CRYSTALS, UA: ABNORMAL
EPITHELIAL CELLS, UA: ABNORMAL /HPF
GLUCOSE URINE: NEGATIVE MG/DL
ICTOTEST: NEGATIVE
KETONES, URINE: NEGATIVE
LEUKOCYTE ESTERASE, URINE: ABNORMAL
MISCELLANEOUS 2: ABNORMAL
NITRITE, URINE: NEGATIVE
PH UA: 5 (ref 5–9)
PROTEIN UA: NEGATIVE
RBC URINE: ABNORMAL /HPF
RENAL EPITHELIAL, UA: ABNORMAL
SPECIFIC GRAVITY, URINE: 1.02 (ref 1–1.03)
UROBILINOGEN, URINE: 1 EU/DL (ref 0–1)
WBC UA: ABNORMAL /HPF
YEAST: ABNORMAL

## 2019-04-22 PROCEDURE — 81001 URINALYSIS AUTO W/SCOPE: CPT

## 2019-04-22 PROCEDURE — 87086 URINE CULTURE/COLONY COUNT: CPT

## 2019-04-22 PROCEDURE — 99283 EMERGENCY DEPT VISIT LOW MDM: CPT

## 2019-04-22 RX ORDER — FLUCONAZOLE 150 MG/1
150 TABLET ORAL ONCE
Qty: 1 TABLET | Refills: 0 | Status: SHIPPED | OUTPATIENT
Start: 2019-04-22 | End: 2019-04-22

## 2019-04-22 RX ORDER — SULFAMETHOXAZOLE AND TRIMETHOPRIM 800; 160 MG/1; MG/1
1 TABLET ORAL 2 TIMES DAILY
Qty: 14 TABLET | Refills: 0 | Status: SHIPPED | OUTPATIENT
Start: 2019-04-22 | End: 2019-04-29

## 2019-04-22 ASSESSMENT — PAIN SCALES - GENERAL: PAINLEVEL_OUTOF10: 5

## 2019-04-22 ASSESSMENT — ENCOUNTER SYMPTOMS
RHINORRHEA: 0
EYE DISCHARGE: 0
NAUSEA: 0
SHORTNESS OF BREATH: 0
BACK PAIN: 0
COUGH: 0
VOMITING: 0
DIARRHEA: 0
WHEEZING: 0
EYE PAIN: 0
SORE THROAT: 0
ABDOMINAL PAIN: 0

## 2019-04-22 ASSESSMENT — PAIN DESCRIPTION - PAIN TYPE: TYPE: ACUTE PAIN

## 2019-04-22 ASSESSMENT — PAIN DESCRIPTION - LOCATION: LOCATION: GROIN

## 2019-04-22 ASSESSMENT — PAIN DESCRIPTION - DESCRIPTORS: DESCRIPTORS: BURNING

## 2019-04-22 NOTE — ED NOTES
Patient presents to ED with caregiver with complaint of burning with urination and nighttime incontinence for past week. Patient is unable to provide urine sample at this time.      Bobbi Garcia RN  04/22/19 3836

## 2019-04-22 NOTE — ED NOTES
Clear yellow urine collected and sent to the lab. Patient complaint \"it wang\".        Marleen Vega RN  04/22/19 5626

## 2019-04-22 NOTE — ED PROVIDER NOTES
NONFORMULARY Triple antibiotic oitnment to area  As neededHistorical Med      !! NONFORMULARY Preparation H cream apply 3 times as day as neededHistorical Med      magnesium hydroxide (MILK OF MAGNESIA) 400 MG/5ML suspension Take 30 mLs by mouth daily as needed for Constipation. acetaminophen (TYLENOL) 325 MG tablet Take 650 mg by mouth every 4 hours as needed for Pain or Fever. loperamide (IMODIUM) 2 MG capsule Take 2 mg by mouth as needed for Diarrhea. !! - Potential duplicate medications found. Please discuss with provider. ALLERGIES     is allergic to nsaids. FAMILY HISTORY     indicated that her mother is alive. family history includes Cancer in her mother. SOCIAL HISTORY      reports that she has never smoked. She has never used smokeless tobacco. She reports that she does not drink alcohol or use drugs. PHYSICAL EXAM     INITIAL VITALS:  height is 5' 4\" (1.626 m) and weight is 180 lb (81.6 kg). Her oral temperature is 98.1 °F (36.7 °C). Her blood pressure is 139/97 (abnormal) and her pulse is 65. Her respiration is 16 and oxygen saturation is 95%. Physical Exam   Constitutional: She is oriented to person, place, and time. She appears well-developed and well-nourished. Non-toxic appearance. HENT:   Head: Normocephalic and atraumatic. Right Ear: Tympanic membrane and external ear normal.   Left Ear: Tympanic membrane and external ear normal.   Nose: Nose normal.   Mouth/Throat: Oropharynx is clear and moist and mucous membranes are normal. No oropharyngeal exudate, posterior oropharyngeal edema or posterior oropharyngeal erythema. Eyes: Conjunctivae and EOM are normal.   Neck: Normal range of motion. Neck supple. No JVD present. Cardiovascular: Normal rate, regular rhythm, normal heart sounds, intact distal pulses and normal pulses. Exam reveals no gallop and no friction rub. No murmur heard.   Pulmonary/Chest: Effort normal and breath sounds normal. No

## 2019-04-23 LAB
ORGANISM: ABNORMAL
URINE CULTURE REFLEX: ABNORMAL

## 2019-06-15 ENCOUNTER — HOSPITAL ENCOUNTER (EMERGENCY)
Age: 59
Discharge: HOME | End: 2019-06-15
Payer: MEDICARE

## 2019-06-15 ENCOUNTER — APPOINTMENT (OUTPATIENT)
Dept: GENERAL RADIOLOGY | Age: 59
End: 2019-06-15
Payer: MEDICARE

## 2019-06-15 VITALS
HEART RATE: 69 BPM | RESPIRATION RATE: 19 BRPM | SYSTOLIC BLOOD PRESSURE: 129 MMHG | OXYGEN SATURATION: 95 % | TEMPERATURE: 98.6 F | DIASTOLIC BLOOD PRESSURE: 68 MMHG

## 2019-06-15 DIAGNOSIS — J18.9 PNEUMONIA DUE TO ORGANISM: ICD-10-CM

## 2019-06-15 DIAGNOSIS — R53.1 GENERAL WEAKNESS: Primary | ICD-10-CM

## 2019-06-15 LAB
ALBUMIN SERPL-MCNC: 3.4 G/DL (ref 3.5–5.1)
ALP BLD-CCNC: 59 U/L (ref 38–126)
ALT SERPL-CCNC: 12 U/L (ref 11–66)
AMMONIA: 24 UMOL/L (ref 11–60)
ANION GAP SERPL CALCULATED.3IONS-SCNC: 12 MEQ/L (ref 8–16)
AST SERPL-CCNC: 15 U/L (ref 5–40)
BACTERIA: ABNORMAL /HPF
BASOPHILS # BLD: 0.6 %
BASOPHILS ABSOLUTE: 0 THOU/MM3 (ref 0–0.1)
BILIRUB SERPL-MCNC: < 0.2 MG/DL (ref 0.3–1.2)
BILIRUBIN URINE: NEGATIVE
BLOOD, URINE: NEGATIVE
BUN BLDV-MCNC: 10 MG/DL (ref 7–22)
CALCIUM SERPL-MCNC: 8.9 MG/DL (ref 8.5–10.5)
CASTS 2: ABNORMAL /LPF
CASTS UA: ABNORMAL /LPF
CHARACTER, URINE: ABNORMAL
CHLORIDE BLD-SCNC: 106 MEQ/L (ref 98–111)
CO2: 28 MEQ/L (ref 23–33)
COLOR: YELLOW
CREAT SERPL-MCNC: 0.6 MG/DL (ref 0.4–1.2)
CRYSTALS, UA: ABNORMAL
EOSINOPHIL # BLD: 4.4 %
EOSINOPHILS ABSOLUTE: 0.2 THOU/MM3 (ref 0–0.4)
EPITHELIAL CELLS, UA: ABNORMAL /HPF
ERYTHROCYTE [DISTWIDTH] IN BLOOD BY AUTOMATED COUNT: 16.6 % (ref 11.5–14.5)
ERYTHROCYTE [DISTWIDTH] IN BLOOD BY AUTOMATED COUNT: 56 FL (ref 35–45)
GFR SERPL CREATININE-BSD FRML MDRD: > 90 ML/MIN/1.73M2
GLUCOSE BLD-MCNC: 80 MG/DL (ref 70–108)
GLUCOSE URINE: NEGATIVE MG/DL
HCT VFR BLD CALC: 40.9 % (ref 37–47)
HEMOGLOBIN: 12.2 GM/DL (ref 12–16)
IMMATURE GRANS (ABS): 0.04 THOU/MM3 (ref 0–0.07)
IMMATURE GRANULOCYTES: 0.8 %
KETONES, URINE: NEGATIVE
LACTIC ACID: 1.6 MMOL/L (ref 0.5–2.2)
LEUKOCYTE ESTERASE, URINE: ABNORMAL
LYMPHOCYTES # BLD: 17.6 %
LYMPHOCYTES ABSOLUTE: 0.9 THOU/MM3 (ref 1–4.8)
MCH RBC QN AUTO: 27.6 PG (ref 26–33)
MCHC RBC AUTO-ENTMCNC: 29.8 GM/DL (ref 32.2–35.5)
MCV RBC AUTO: 92.5 FL (ref 81–99)
MISCELLANEOUS 2: ABNORMAL
MONOCYTES # BLD: 8.2 %
MONOCYTES ABSOLUTE: 0.4 THOU/MM3 (ref 0.4–1.3)
NITRITE, URINE: NEGATIVE
NUCLEATED RED BLOOD CELLS: 0 /100 WBC
OSMOLALITY CALCULATION: 288.6 MOSMOL/KG (ref 275–300)
PH UA: 6.5 (ref 5–9)
PLATELET # BLD: 184 THOU/MM3 (ref 130–400)
PMV BLD AUTO: 9.5 FL (ref 9.4–12.4)
POTASSIUM SERPL-SCNC: 3.6 MEQ/L (ref 3.5–5.2)
PROCALCITONIN: 0.05 NG/ML (ref 0.01–0.09)
PROTEIN UA: NEGATIVE
RBC # BLD: 4.42 MILL/MM3 (ref 4.2–5.4)
RBC URINE: ABNORMAL /HPF
RENAL EPITHELIAL, UA: ABNORMAL
SEG NEUTROPHILS: 68.4 %
SEGMENTED NEUTROPHILS ABSOLUTE COUNT: 3.6 THOU/MM3 (ref 1.8–7.7)
SODIUM BLD-SCNC: 146 MEQ/L (ref 135–145)
SPECIFIC GRAVITY, URINE: 1.03 (ref 1–1.03)
T4 FREE: 1.04 NG/DL (ref 0.93–1.76)
TOTAL PROTEIN: 7 G/DL (ref 6.1–8)
TROPONIN T: < 0.01 NG/ML
TSH SERPL DL<=0.05 MIU/L-ACNC: 7.13 UIU/ML (ref 0.4–4.2)
UROBILINOGEN, URINE: 1 EU/DL (ref 0–1)
WBC # BLD: 5.2 THOU/MM3 (ref 4.8–10.8)
WBC UA: ABNORMAL /HPF
YEAST: ABNORMAL

## 2019-06-15 PROCEDURE — 82140 ASSAY OF AMMONIA: CPT

## 2019-06-15 PROCEDURE — 84439 ASSAY OF FREE THYROXINE: CPT

## 2019-06-15 PROCEDURE — 87040 BLOOD CULTURE FOR BACTERIA: CPT

## 2019-06-15 PROCEDURE — 71046 X-RAY EXAM CHEST 2 VIEWS: CPT

## 2019-06-15 PROCEDURE — 99284 EMERGENCY DEPT VISIT MOD MDM: CPT

## 2019-06-15 PROCEDURE — 84145 PROCALCITONIN (PCT): CPT

## 2019-06-15 PROCEDURE — 83605 ASSAY OF LACTIC ACID: CPT

## 2019-06-15 PROCEDURE — 36415 COLL VENOUS BLD VENIPUNCTURE: CPT

## 2019-06-15 PROCEDURE — 85025 COMPLETE CBC W/AUTO DIFF WBC: CPT

## 2019-06-15 PROCEDURE — 2580000003 HC RX 258: Performed by: PHYSICIAN ASSISTANT

## 2019-06-15 PROCEDURE — 84484 ASSAY OF TROPONIN QUANT: CPT

## 2019-06-15 PROCEDURE — 81001 URINALYSIS AUTO W/SCOPE: CPT

## 2019-06-15 PROCEDURE — 84443 ASSAY THYROID STIM HORMONE: CPT

## 2019-06-15 PROCEDURE — 80053 COMPREHEN METABOLIC PANEL: CPT

## 2019-06-15 RX ORDER — 0.9 % SODIUM CHLORIDE 0.9 %
1000 INTRAVENOUS SOLUTION INTRAVENOUS ONCE
Status: COMPLETED | OUTPATIENT
Start: 2019-06-15 | End: 2019-06-15

## 2019-06-15 RX ADMIN — SODIUM CHLORIDE 1000 ML: 9 INJECTION, SOLUTION INTRAVENOUS at 15:00

## 2019-06-15 ASSESSMENT — ENCOUNTER SYMPTOMS
COUGH: 1
NAUSEA: 0
DIARRHEA: 0
SHORTNESS OF BREATH: 1
SORE THROAT: 0
BACK PAIN: 0
RHINORRHEA: 0
VOMITING: 0
WHEEZING: 0
EYE PAIN: 0
EYE DISCHARGE: 0
ABDOMINAL PAIN: 1

## 2019-06-15 ASSESSMENT — PAIN DESCRIPTION - PAIN TYPE: TYPE: ACUTE PAIN

## 2019-06-15 ASSESSMENT — PAIN DESCRIPTION - LOCATION: LOCATION: GENERALIZED

## 2019-06-15 ASSESSMENT — PAIN DESCRIPTION - DESCRIPTORS: DESCRIPTORS: ACHING

## 2019-06-15 NOTE — ED PROVIDER NOTES
[address] [facility] [address] [address] EMERGENCY DEPT      CHIEF COMPLAINT       Chief Complaint   Patient presents with    Cough    Fatigue       Nurses Notes reviewed and I agree except as noted in the HPI. HISTORY OF PRESENT ILLNESS    Blane López is a 60 y.o. female who presents to the ED with a medical history of MR for the evaluation of evaluation of a cough and fatigue. The patient's caregiver, who is at bedside, states that the patient was admitted to [facility] Emergency Department yesterday due to reported left lower lobe pneumonia and an UTI. Patient had a CTA chest and CT head completed during her stay. She states that the patient was discharged back to her group home today around 1200 but reports that the patient was increasingly more fatigued and generally weak during transport causing the patient to be brought to the ED for additional evaluation. The patient reports chest pain, shortness of breath, abdominal pain, and dysuria but states \"yes\" to all review of system questions. Caregiver is unaware of when the patient's presenting symptoms became present. The patient reports no additional symptoms or complaints at this time. REVIEW OF SYSTEMS     Review of Systems   Constitutional: Positive for fatigue. Negative for appetite change, chills and fever. HENT: Negative for congestion, ear pain, rhinorrhea and sore throat. Eyes: Negative for pain, discharge and visual disturbance. Respiratory: Positive for cough and shortness of breath. Negative for wheezing. Cardiovascular: Positive for chest pain. Negative for palpitations and leg swelling. Gastrointestinal: Positive for abdominal pain. Negative for diarrhea, nausea and vomiting. Genitourinary: Positive for dysuria. Negative for difficulty urinating, hematuria and vaginal discharge. Musculoskeletal: Negative for arthralgias, back pain, joint swelling and neck pain. Skin: Negative for pallor and rash.    Neurological: Negative for dizziness, syncope, magnesium hydroxide (MILK OF MAGNESIA) 400 MG/5ML suspension Take 30 mLs by mouth daily as needed for Constipation. calcium-vitamin D (OSCAL-500) 500-200 MG-UNIT per tablet Take 1 tablet by mouth 3 times daily. , Disp-30 tablet      calcitonin, salmon, (MIACALCIN) 200 UNIT/ACT nasal spray 1 spray by Nasal route daily. , Disp-1 Bottle      aspirin 81 MG EC tablet Take 1 tablet by mouth daily. , Disp-30 tablet, R-0      acetaminophen (TYLENOL) 325 MG tablet Take 650 mg by mouth every 4 hours as needed for Pain or Fever. loperamide (IMODIUM) 2 MG capsule Take 2 mg by mouth as needed for Diarrhea.      polyethylene glycol (GLYCOLAX) powder Take 17 g by mouth as needed Historical Med      chlorhexidine (PERIDEX) 0.12 % solution Take 15 mLs by mouth 2 times daily. fludrocortisone (FLORINEF) 0.1 MG tablet Take 0.1 mg by mouth daily. levothyroxine (SYNTHROID) 125 MCG tablet Take 100 mcg by mouth Daily       therapeutic multivitamin-minerals (THERAGRAN-M) tablet Take 1 tablet by mouth daily. !! - Potential duplicate medications found. Please discuss with provider. ALLERGIES     is allergic to nsaids. FAMILY HISTORY     indicated that her mother is alive. family history includes Cancer in her mother. SOCIAL HISTORY    reports that she has never smoked. She has never used smokeless tobacco. She reports that she does not drink alcohol or use drugs. PHYSICAL EXAM     INITIAL VITALS:  oral temperature is 98.6 °F (37 °C). Her blood pressure is 129/68 and her pulse is 69. Her respiration is 19 and oxygen saturation is 95%. Physical Exam   Constitutional: She is oriented to person, place, and time. Vital signs are normal. She appears well-developed and well-nourished. Non-toxic appearance. No distress. HENT:   Head: Normocephalic and atraumatic. Right Ear: Hearing normal.   Left Ear: Hearing normal.   Nose: Nose normal. No rhinorrhea.    Mouth/Throat: Uvula is midline, oropharynx is clear and moist and mucous membranes are normal. No oropharyngeal exudate. Eyes: Pupils are equal, round, and reactive to light. Conjunctivae, EOM and lids are normal. No scleral icterus. Neck: Normal range of motion. Neck supple. No JVD present. No neck rigidity. No tracheal deviation present. Cardiovascular: Normal rate and regular rhythm. Murmur heard. Pulmonary/Chest: Effort normal. No stridor. No respiratory distress. She has decreased breath sounds. She has no wheezes. She has rales (Left upper lobe). Abdominal: Soft. Normal appearance and bowel sounds are normal. She exhibits no distension and no pulsatile midline mass. There is no tenderness. There is no rigidity, no rebound, no guarding, no CVA tenderness, no tenderness at McBurney's point and negative Brand's sign. No hernia. Musculoskeletal: Normal range of motion. She exhibits no edema. Movement normal as observed   Lymphadenopathy:     She has no cervical adenopathy. Neurological: She is alert and oriented to person, place, and time. She has normal strength. Gait normal.   No gross deficits observed   Skin: Skin is warm, dry and intact. No rash noted. She is not diaphoretic. No pallor. Psychiatric: She has a normal mood and affect. Her speech is normal and behavior is normal. Thought content normal.   Nursing note and vitals reviewed. DIFFERENTIAL DIAGNOSIS:   Including but not limited to: pneumonia, bronchitis, UTI, viral illness, sepsis    DIAGNOSTIC RESULTS     EKG: All [facility]'s [facility] who either signs or Co-signs this chart in the absence of a cardiologist.  None    RADIOLOGY: non-plain film images(s) such as CT,Ultrasound and MRI are read by the radiologist.  Plain radiographic images are visualized and preliminarily interpreted by the emergency physician unless otherwise stated below.   XR CHEST STANDARD (2 VW)   Final Result   Stable radiographic appearance of the chest. No evidence of an acute cardiopulmonary process. Gaseous distention of the colon. This is very similar to the prior exam.               **This report has been created using voice recognition software. It may contain minor errors which are inherent in voice recognition technology. **      Final report electronically signed by Dr. Reddy Verma on 6/15/2019 3:54 PM          LABS:   Labs Reviewed   CBC WITH AUTO DIFFERENTIAL - Abnormal; Notable for the following components:       Result Value    MCHC 29.8 (*)     RDW-CV 16.6 (*)     RDW-SD 56.0 (*)     Lymphocytes # 0.9 (*)     All other components within normal limits   COMPREHENSIVE METABOLIC PANEL - Abnormal; Notable for the following components:    Sodium 146 (*)     Alb 3.4 (*)     Total Bilirubin <0.2 (*)     All other components within normal limits   URINE WITH REFLEXED MICRO - Abnormal; Notable for the following components:    Leukocyte Esterase, Urine TRACE (*)     Character, Urine TURBID (*)     All other components within normal limits   TSH WITH REFLEX - Abnormal; Notable for the following components:    TSH 7.130 (*)     All other components within normal limits   CULTURE BLOOD #1   CULTURE BLOOD #2   LACTIC ACID, PLASMA   PROCALCITONIN   TROPONIN   ANION GAP   GLOMERULAR FILTRATION RATE, ESTIMATED   OSMOLALITY   AMMONIA   T4, FREE   BLOOD GAS, ARTERIAL       EMERGENCY DEPARTMENT COURSE:   Vitals:    Vitals:    06/15/19 1355 06/15/19 1512 06/15/19 1605 06/15/19 1702   BP: (!) 151/60 (!) 146/48 134/64 129/68   Pulse: 79 80 76 69   Resp: 20 21 20 19   Temp: 98.6 °F (37 °C)      TempSrc: Oral      SpO2: 95% 94% 94% 95%     The patient was seen and evaluated within the ED today for the evaluation of cough, fatigue, and weakness. The patient presented in no acute distress. Physical exam revealed decreased breath sounds and wheezing. TSH is 7.130. Ammonia is 24. UA is negative for nitrates and reveals a trace amount of leukocytosis.  Lactic and Procal is normal. Troponin is 0.010. Remaining labs were reassuring. Respiratory attempted to obtain an ABG but were unsuccessful on their first attempt and the patient refused to have another draw completed. Standard chest XR is negative for acute findings. The patient was treated with IV fluids while in the ED. I observed the patient's condition to remain stable during the duration of her stay. The patient has been observed. The patient has remained stable in the ER with no respiratory distress noted. On reexam, respiratory effort remains normal and lungs are CTAB. The patient remained nontoxic appearing with good vital signs. She has eaten multiple times, seemed to have gotten over her weakness and fatigue, and ambulated in the ED with a steady independent gait. I have discussed this patient with my attending physician, Dr. Keira Nolan, who aided in medical decision making and agreed discharge was appropriate. The patient and her caregiver were amenable to my decision for discharge and was sent back to her group home in stable condition. I discussed return precautions and the patient's caregiver verbalized understanding. I recommended that she f/u with her pcp in 3-5 days for further evaluation and work up if their symptoms do not improve. All questions and concerns were addressed. CRITICAL CARE:   None    CONSULTS:  None    PROCEDURES:  None    FINAL IMPRESSION      1. General weakness    2. Pneumonia due to organism          DISPOSITION/PLAN     1. General weakness    2.  Pneumonia due to organism        PATIENT REFERRED TO:  ARNALDO Haddad - CNP  [address] [place] [address] [facility]  [phone]    Schedule an appointment as soon as possible for a visit in 2 days        DISCHARGE MEDICATIONS:  Discharge Medication List as of 6/15/2019  6:25 PM          (Please note that portions of this note were completed with a voice recognition program.  Efforts were made to edit the dictations but occasionally words are mis-transcribed.)    Scribe:  Yeni Alvaradoesteban 6/15/19 2:37 PM Scribing for and in the presence of ELLIOTT Alex. Signed by:Lavelle Meléndez, 06/15/19 9:15 PM    Provider:  I personally performed the services described in the documentation, reviewed and edited the documentation which was dictated to the scribe in my presence, and itaccurately records my words and actions.     ELLIOTT Alex 06/15/19 9:15 PM         ELLIOTT Alex  06/15/19 3260

## 2019-06-15 NOTE — ED NOTES
Pt resting on cot with worker at bedside. Pt denies needs. Pt continues to report \"I dont feel good. \"        Lissett King RN  06/15/19 1134

## 2019-06-15 NOTE — ED TRIAGE NOTES
Pt presents to the ED coming from [facility]. Pt presents with a group home employee. Pt lives in a group home due to mental disabilities. Group worker reports they took pt to [facility] due to cough and fever and weakness that started yesterday. Reports that pt was diagnosed with pneumonia and sent home with a prescription for antibiotics.  reports that pt is too weak and fatigued and is having difficulty with transferring and activities of daily living that she is usually able to do because of being sick. Pt repeatedly states \"I dont feel good\" on arrival. Pt is afebrile on arrival. Provider notified.

## 2019-06-15 NOTE — ED NOTES
No significant changes from previous assessment. VS stable. Pt continues to report \"I dont feel good. \"      Jalyn Rothman RN  06/15/19 9186

## 2019-06-17 ENCOUNTER — TELEPHONE (OUTPATIENT)
Dept: CARDIOLOGY CLINIC | Age: 59
End: 2019-06-17

## 2019-06-18 ENCOUNTER — OFFICE VISIT (OUTPATIENT)
Dept: CARDIOLOGY CLINIC | Age: 59
End: 2019-06-18
Payer: MEDICARE

## 2019-06-18 VITALS
DIASTOLIC BLOOD PRESSURE: 77 MMHG | SYSTOLIC BLOOD PRESSURE: 119 MMHG | WEIGHT: 192 LBS | BODY MASS INDEX: 30.86 KG/M2 | HEART RATE: 100 BPM | HEIGHT: 66 IN

## 2019-06-18 DIAGNOSIS — R07.1 CHEST PAIN ON BREATHING: ICD-10-CM

## 2019-06-18 DIAGNOSIS — Z87.898 HISTORY OF DEVELOPMENTAL DELAY: ICD-10-CM

## 2019-06-18 DIAGNOSIS — Z87.01 HISTORY OF PNEUMONIA: ICD-10-CM

## 2019-06-18 DIAGNOSIS — I31.39 PERICARDIAL EFFUSION: Primary | ICD-10-CM

## 2019-06-18 PROCEDURE — G8417 CALC BMI ABV UP PARAM F/U: HCPCS | Performed by: NURSE PRACTITIONER

## 2019-06-18 PROCEDURE — G8427 DOCREV CUR MEDS BY ELIG CLIN: HCPCS | Performed by: NURSE PRACTITIONER

## 2019-06-18 PROCEDURE — G8599 NO ASA/ANTIPLAT THER USE RNG: HCPCS | Performed by: NURSE PRACTITIONER

## 2019-06-18 PROCEDURE — 99213 OFFICE O/P EST LOW 20 MIN: CPT | Performed by: NURSE PRACTITIONER

## 2019-06-18 PROCEDURE — 3017F COLORECTAL CA SCREEN DOC REV: CPT | Performed by: NURSE PRACTITIONER

## 2019-06-18 PROCEDURE — 1036F TOBACCO NON-USER: CPT | Performed by: NURSE PRACTITIONER

## 2019-06-18 RX ORDER — HYDROXYZINE HYDROCHLORIDE 25 MG/1
25 TABLET, FILM COATED ORAL 3 TIMES DAILY PRN
COMMUNITY
End: 2020-10-15

## 2019-06-18 RX ORDER — ONDANSETRON 4 MG/1
4 TABLET, ORALLY DISINTEGRATING ORAL EVERY 8 HOURS PRN
COMMUNITY
End: 2020-10-15

## 2019-06-18 RX ORDER — WHEAT DEXTRIN 3 G/3.8 G
4 POWDER (GRAM) ORAL
COMMUNITY
End: 2022-06-29 | Stop reason: SDUPTHER

## 2019-06-18 NOTE — PROGRESS NOTES
[facility] PROFESSIONAL SERVICES  [facility] [address]   [address] [place] [address] [address] [address]   Dept: [phone]   Dept Fax: [phone]   Loc: [phone]      Chief Complaint   Patient presents with    Chest Pain     F/U from ER visit for pneumonia in female with developmental delay. She was given ATB and fluids and improved and sent back to the group home. She saw her PCP and it was recommended she come here for cmplaints of chest pain. Her troponin was normal in the ER. Today she is accompanied by group home employee who reports she complains of chest pain with coughing or deep breathing but is not complaining at rest or with her normal activities. The pain is reproducible with palpation and deep breath. She answers ye to every question involving symptoms and is somewhat hard to evaluate. She has history of a pericardial effusion and was to have a repeat echo that has not been completed yet.   Cardiologist: Saw Dr. Dani Negron in past        General:   No fever, no chills, No fatigue or weight loss  Pulmonary:    No dyspnea, no wheezing  Cardiac:     recent chest pain with coughing that increases with deep breath and is reproducible with palpation   GI:     No nausea or vomiting, no abdominal pain  Neuro:    No dizziness or light headedness  Musculoskeletal:  No recent active issues  Extremities:   No edema, good peripheral pulses      Past Medical History:   Diagnosis Date    Arthritis     Bipolar disorder ([address]     CHF (congestive heart failure) (HCC)     Chronic kidney disease     Coarse tremors     Constipation     COPD (chronic obstructive pulmonary disease) ([facility])     Depression     Gait difficulty     General weakness     GERD (gastroesophageal reflux disease)     Hypertension     Mental retardation     Pneumonia     Seizures ([address]     Thyroid disease     UTI (lower urinary tract infection)        Allergies   Allergen Reactions    Nsaids      Interacts with psych meds       Current Outpatient Medications   Medication Sig Dispense Refill    Wheat Dextrin (BENEFIBER) POWD Take 4 g by mouth 3 times daily (with meals)      hydrOXYzine (ATARAX) 25 MG tablet Take 25 mg by mouth 3 times daily as needed for Itching      ondansetron (ZOFRAN-ODT) 4 MG disintegrating tablet Take 4 mg by mouth every 8 hours as needed for Nausea or Vomiting      pantoprazole (PROTONIX) 40 MG tablet Take 1 tablet by mouth every morning (before breakfast) 30 tablet 3    lithium 300 MG capsule Take 300 mg by mouth nightly       cloZAPine (CLOZARIL) 50 MG tablet Take 75 mg by mouth daily       docusate sodium (COLACE) 100 MG capsule Take 100 mg by mouth daily      pravastatin (PRAVACHOL) 40 MG tablet Take 40 mg by mouth nightly      melatonin 3 MG TABS tablet Take 5 mg by mouth daily       oxybutynin (DITROPAN XL) 15 MG CR tablet Take 15 mg by mouth daily      divalproex (DEPAKOTE) 500 MG ER tablet Take 1 tablet by mouth nightly. 30 tablet     magnesium hydroxide (MILK OF MAGNESIA) 400 MG/5ML suspension Take 30 mLs by mouth daily as needed for Constipation.  calcium-vitamin D (OSCAL-500) 500-200 MG-UNIT per tablet Take 1 tablet by mouth 3 times daily. 30 tablet     aspirin 81 MG EC tablet Take 1 tablet by mouth daily. 30 tablet 0    acetaminophen (TYLENOL) 325 MG tablet Take 650 mg by mouth every 4 hours as needed for Pain or Fever.  loperamide (IMODIUM) 2 MG capsule Take 2 mg by mouth as needed for Diarrhea.  polyethylene glycol (GLYCOLAX) powder Take 17 g by mouth as needed       chlorhexidine (PERIDEX) 0.12 % solution Take 15 mLs by mouth 2 times daily.  fludrocortisone (FLORINEF) 0.1 MG tablet Take 0.1 mg by mouth daily.  levothyroxine (SYNTHROID) 125 MCG tablet Take 100 mcg by mouth Daily       therapeutic multivitamin-minerals (THERAGRAN-M) tablet Take 1 tablet by mouth daily. No current facility-administered medications for this visit.         Social History     Socioeconomic History    Marital status: Single     Spouse name: None    Number of children: None    Years of education: None    Highest education level: None   Occupational History    None   Social Needs    Financial resource strain: None    Food insecurity:     Worry: None     Inability: None    Transportation needs:     Medical: None     Non-medical: None   Tobacco Use    Smoking status: Never Smoker    Smokeless tobacco: Never Used   Substance and Sexual Activity    Alcohol use: No    Drug use: No    Sexual activity: Never   Lifestyle    Physical activity:     Days per week: None     Minutes per session: None    Stress: None   Relationships    Social connections:     Talks on phone: None     Gets together: None     Attends [employer] service: None     Active member of club or organization: None     Attends meetings of clubs or organizations: None     Relationship status: None    Intimate partner violence:     Fear of current or ex partner: None     Emotionally abused: None     Physically abused: None     Forced sexual activity: None   Other Topics Concern    None   Social History Narrative    None       Family History   Problem Relation Age of Onset    Cancer Mother        Blood pressure 119/77, pulse 100, height 5' 6\" (1.676 m), weight 192 lb (87.1 kg). General:   Well developed, well nourished  Lungs:   Clear to auscultation  Heart:    Normal S1 S2, No murmur, rubs, or gallops  Abdomen:   Soft, non tender, no organomegalies, positive bowel sounds  Extremities:   No edema, no cyanosis, good peripheral pulses  Neurological:   Awake, alert, oriented. No obvious focal deficits  Musculoskeletal:  No obvious deformities    EKG:     Echo: 2/18/19  Summary   The pericardium was normal in appearance with- evidence of a SMALL   anterior ( < 1 ) and small to moderate posterior ( 1.5 ) pericardial   effusion WITHOUT tamponade.   Normal left ventricle size and systolic function.  Ejection fraction was   estimated at 60%. There were no regional left ventricular wall motion   abnormalities and wall thickness was within normal limits.   There was evidence of mild aortic regurgitation.   4/2018 : EF ~ 55% ; SMALL pericardial effusion      Signature      ----------------------------------------------------------------   Electronically signed by Gladis Voss MD      Diagnosis Orders   1. Pericardial effusion  Echo limited   2. Chest pain on breathing     3. History of pneumonia     4. History of developmental delay         Orders Placed This Encounter   Procedures    Echo limited     Standing Status:   Future     Standing Expiration Date:   6/18/2020     Order Specific Question:   Reason for exam:     Answer:   pericardial effussion f/u     Chest pain: pleuritic, musculoskeletal-has been coughing, ? Recent pneumonia. Troponin in ER normal. No fluid overload today. Monitor symptoms-call office or seek emergency medical care if new or worsening symptoms. Chronic small anterior and small to moderate posterior pericardial effusion noted on echos since 2016 without tamponade: Echo to follow up from small anterior and posterior pericardial effusion on echo 2/2019.     Discussed use, benefit, and side effects of prescribed medications. All patient questions answered. Pt voiced understanding. Instructed to continue current medications, diet and exercise. Continue risk factor modification and medical management. Patient agreed with treatment plan. Follow up as directed.     Continue current treatment plan  Follow up as scheduled or sooner if needed

## 2019-06-18 NOTE — PROGRESS NOTES
Patient here for chest discomfort. Patient complains of SOB and lightheadedness. Pt had a nose bleed today. Patient denies chest pain, palpations and NIMISHA.

## 2019-06-21 LAB
BLOOD CULTURE, ROUTINE: NORMAL
BLOOD CULTURE, ROUTINE: NORMAL

## 2019-06-24 ENCOUNTER — HOSPITAL ENCOUNTER (OUTPATIENT)
Dept: NON INVASIVE DIAGNOSTICS | Age: 59
Discharge: HOME | End: 2019-06-24
Payer: MEDICARE

## 2019-06-24 DIAGNOSIS — I31.39 PERICARDIAL EFFUSION: ICD-10-CM

## 2019-06-24 PROCEDURE — 93307 TTE W/O DOPPLER COMPLETE: CPT

## 2019-07-16 ENCOUNTER — OFFICE VISIT (OUTPATIENT)
Dept: CARDIOLOGY CLINIC | Age: 59
End: 2019-07-16
Payer: MEDICARE

## 2019-07-16 VITALS
SYSTOLIC BLOOD PRESSURE: 130 MMHG | BODY MASS INDEX: 29.86 KG/M2 | DIASTOLIC BLOOD PRESSURE: 64 MMHG | WEIGHT: 185 LBS | HEART RATE: 60 BPM

## 2019-07-16 DIAGNOSIS — I31.39 PERICARDIAL EFFUSION: Primary | ICD-10-CM

## 2019-07-16 DIAGNOSIS — I50.32 CHRONIC DIASTOLIC CHF (CONGESTIVE HEART FAILURE) (HCC): ICD-10-CM

## 2019-07-16 DIAGNOSIS — Z87.898 HISTORY OF DEVELOPMENTAL DELAY: ICD-10-CM

## 2019-07-16 PROCEDURE — 3017F COLORECTAL CA SCREEN DOC REV: CPT | Performed by: NURSE PRACTITIONER

## 2019-07-16 PROCEDURE — 99213 OFFICE O/P EST LOW 20 MIN: CPT | Performed by: NURSE PRACTITIONER

## 2019-07-16 PROCEDURE — G8417 CALC BMI ABV UP PARAM F/U: HCPCS | Performed by: NURSE PRACTITIONER

## 2019-07-16 PROCEDURE — 1036F TOBACCO NON-USER: CPT | Performed by: NURSE PRACTITIONER

## 2019-07-16 PROCEDURE — G8599 NO ASA/ANTIPLAT THER USE RNG: HCPCS | Performed by: NURSE PRACTITIONER

## 2019-07-16 PROCEDURE — G8427 DOCREV CUR MEDS BY ELIG CLIN: HCPCS | Performed by: NURSE PRACTITIONER

## 2019-07-16 NOTE — PROGRESS NOTES
medications. All patient questions answered. Pt voiced understanding. Instructed to continue current medications, diet and exercise. Continue risk factor modification and medical management. Patient agreed with treatment plan. Follow up as directed.     Continue current treatment plan  Follow up as scheduled or sooner if needed

## 2019-07-17 ENCOUNTER — TELEPHONE (OUTPATIENT)
Dept: CARDIOLOGY CLINIC | Age: 59
End: 2019-07-17

## 2019-08-05 ENCOUNTER — APPOINTMENT (OUTPATIENT)
Dept: PHYSICAL THERAPY | Age: 59
End: 2019-08-05
Payer: MEDICARE

## 2019-08-08 ENCOUNTER — HOSPITAL ENCOUNTER (OUTPATIENT)
Dept: WOMENS IMAGING | Age: 59
Discharge: HOME | End: 2019-08-08
Payer: MEDICARE

## 2019-08-08 DIAGNOSIS — Z12.31 VISIT FOR SCREENING MAMMOGRAM: ICD-10-CM

## 2019-08-08 PROCEDURE — 77063 BREAST TOMOSYNTHESIS BI: CPT

## 2019-08-09 ENCOUNTER — HOSPITAL ENCOUNTER (OUTPATIENT)
Dept: PHYSICAL THERAPY | Age: 59
Setting detail: THERAPIES SERIES
Discharge: HOME | End: 2019-08-09
Payer: MEDICARE

## 2019-08-09 PROCEDURE — 97161 PT EVAL LOW COMPLEX 20 MIN: CPT

## 2019-08-09 PROCEDURE — 97110 THERAPEUTIC EXERCISES: CPT

## 2019-08-09 NOTE — FLOWSHEET NOTE
of falling and vision (depth perception) affected. Pt denies falls. Pt tends to sit with legs crossed, despite staff trying to get pt to avoid. Pt has frequent UTIs with poor resolution since abimael one in April. Pt had pneumonia last month. Pain:    No.      Social/Functional History:    Type of Home: (group home)  Home Layout: One level  Home Access: Ramped entrance     IADL Comments: Pt doesn't like to assist with housework. 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; likes to look at pictures, go out to eat, take rides in cars    Objective  Overall Orientation Status: Within Functional Limits          Observation/Palpation  Posture: Fair  Observation: sits with right leg crossed over left; overweight; pt standing with left weight shift, possibly d/t shorter leg but hips appear nearly level           RLE AROM: WFL         LLE AROM : WFL         Strength RLE: WFL  Comment: grossly 4/5    Strength LLE: WFL  Comment: grossly 4/5         Special Tests: 5 sit to/from stand transfers in 38 seconds          Overall Sensation Status: WFL             Transfers  Sit to Stand: Modified independent(uses UE to assist)  Stand to sit: Modified independent(controls descent with hands)            Ambulation 1  Surface: carpet  Device: No Device  Assistance: Independent  Quality of Gait: slow pace; shortened step length but equal; widened base of support; easily distracted  Distance: 80ft            Balance  Comments: see Tinetti         TINETTI BALANCE TEST    BALANCE Patient is seated in a hard, armless chair   1 SITTING BALANCE 1 - Steady, safe   2. RISES FROM CHAIR 1 - Able, uses arms to help   3. ATTEMPTS TO RISE 2 - Able to rise, 1 attempt   4.   IMMEDIATE STANDING BALANCE (FIRST 5 SECONDS) 2 - Steady without walker or other support   5. STANDING BALANCE 1 - Steady but wide stance and uses support   6. NUDGED 1 - Staggers, grabs, catches   7. EYES CLOSED 0 - Unsteady   8. TURNING 360 DEGREES 1 - Continuous and 0 - Unsteady (grabs,staggers)   9. SITTING DOWN 1 - Uses arms or not a smooth motion   BALANCE SCORE 10/16       GAIT SECTION Patient stands with therapist, walks across room (+/- aids), fist at usual pace, then at rapid pace. 1.  INDICATION OF GAIT (Immediately after told to \"go\" 1 - No hesitancy   2. STEP LENGTH AND HEIGHT 1 - Step through Right and 1 - Step through Left   3. FOOT CLEARANCE 1 - Left foot clears floor and 1 - Right foot clears floor   4. STEP SYMMETRY 1 - Right and left step length appear equal   5. STEP CONTINUITY 1 - Steps appear continuous   6. PATH 1 - Mild/moderate deviation or uses walking aid   7. TRUNK 1 - No sway but flexes knees or back or uses arms for stability   8. WALKING TIME 0 - Heels apart   GAIT SCORE 9/12   TOTAL SCORE 19/28   Risk Indicators:  Less than/equal to 18 = high risk  19-23 Moderate risk  Greater than/equal to 24 = low risk            Exercises  Exercise 1: Standing with BUE support: 3 way hip, march, mini-squats, heel raises x5 each -pt requires verbal, visual and tactile cues to complete      Activity Tolerance:  Activity Tolerance: Patient Tolerated treatment well    Assessment: Body structures, Functions, Activity limitations: Decreased strength, Decreased balance  Assessment: 60year old presents with generalized weakness and physical deconditioning, affecting safety with gait. Pt very fixated on new purse during session. Pt has difficulty following commands for HEP from PT and caregiver. Pt easily distracted. Pt demos good fine motor skills and UE functional strength, therefore OT eval not warranted. Pt will benefit from weekly follow up with PT and home health evaluation may be more appropriate in the future, pending progress.

## 2019-08-15 ENCOUNTER — HOSPITAL ENCOUNTER (OUTPATIENT)
Dept: PHYSICAL THERAPY | Age: 59
Setting detail: THERAPIES SERIES
Discharge: HOME | End: 2019-08-15
Payer: MEDICARE

## 2019-08-15 PROCEDURE — 97110 THERAPEUTIC EXERCISES: CPT

## 2019-08-15 NOTE — DISCHARGE SUMMARY
Greg Arredondo     Time In: 4760  Time Out: 1500  Minutes: 15  Timed Code Treatment Minutes: 15 Minutes    Date: 8/15/2019  Patient Name: Juan Pablo James,  Gender:  female        CSN: [id number]   : 1960  (60 y.o.)       Referring Practitioner: Anders Mehta CNP      Diagnosis: R53.1- Weakness; R53.81 - Other malaise  Treatment Diagnosis: generalized weakness, impaired balance, difficulty ambulating   Additional Pertinent Hx: PMH: MRDD, Bipolar, memory issues, UTI. General:  PT Visit Information  Onset Date: 19  PT Insurance Information: Medicare- unlimited visits based on medical necessity, aquatics and modalities covered except ionto, HP/CP  Total # of Visits to Date: 2  Plan of Care/Certification Expiration Date: 19  Progress Note Counter: 2/10 for PN. Subjective:  Chart Reviewed: Yes  Patient assessed for rehabilitation services?: Yes  Comments: Follow up with Anders Mehta in September. Subjective: Pt presents with caregiver. Caregiver reports pt is doing HEP daily. Caregiver has  noticed pt having difficulty with perception with ramps and steps and unsure if vision or balance issue. Pain:  Patient Currently in Pain: No         Objective         Exercises  Exercise 1: Standing with BUE support: 3 way hip, march, mini-squats, heel raises x10 each -pt requires verbal, visual and tactile cues to complete        TINETTI BALANCE TEST    BALANCE Patient is seated in a hard, armless chair   1 SITTING BALANCE 1 - Steady, safe   2. RISES FROM CHAIR 1 - Able, uses arms to help   3. ATTEMPTS TO RISE 2 - Able to rise, 1 attempt   4. IMMEDIATE STANDING BALANCE (FIRST 5 SECONDS) 2 - Steady without walker or other support   5. STANDING BALANCE 1 - Steady but wide stance and uses support   6. NUDGED 2 - Steady   7. EYES CLOSED 1 - Steady   8.   TURNING 360 DEGREES 1 - Continuous will improve Tinetti score from 19/28 to 22/28 to decrease fear of falling and frequency of running into urbina. MET- Tinetti score 24/28. Long term goal 3: Pt will ambulate with minimal path deviation and minimal sway to decrease fall risk in home and in community. MET- Pt ambulates with varying pace, mild trunk sway and minimal path deviation.      Deon Kuhn, PT

## 2019-10-14 ENCOUNTER — HOSPITAL ENCOUNTER (EMERGENCY)
Age: 59
Discharge: HOME | End: 2019-10-14
Payer: MEDICARE

## 2019-10-14 VITALS
TEMPERATURE: 97.7 F | RESPIRATION RATE: 20 BRPM | BODY MASS INDEX: 33.89 KG/M2 | OXYGEN SATURATION: 95 % | SYSTOLIC BLOOD PRESSURE: 125 MMHG | WEIGHT: 210 LBS | HEART RATE: 65 BPM | DIASTOLIC BLOOD PRESSURE: 64 MMHG

## 2019-10-14 DIAGNOSIS — B37.2 CANDIDIASIS, INTERTRIGO: Primary | ICD-10-CM

## 2019-10-14 PROCEDURE — 99213 OFFICE O/P EST LOW 20 MIN: CPT

## 2019-10-14 PROCEDURE — 99213 OFFICE O/P EST LOW 20 MIN: CPT | Performed by: NURSE PRACTITIONER

## 2019-10-14 RX ORDER — NYSTATIN 100000 U/G
CREAM TOPICAL
Qty: 30 G | Refills: 1 | Status: SHIPPED | OUTPATIENT
Start: 2019-10-14 | End: 2020-10-15

## 2020-03-16 ENCOUNTER — HOSPITAL ENCOUNTER (EMERGENCY)
Age: 60
Discharge: ANOTHER HOSPITAL | End: 2020-03-16
Payer: MEDICARE

## 2020-03-16 VITALS
BODY MASS INDEX: 29.7 KG/M2 | DIASTOLIC BLOOD PRESSURE: 59 MMHG | HEART RATE: 85 BPM | TEMPERATURE: 98.2 F | OXYGEN SATURATION: 94 % | WEIGHT: 184 LBS | SYSTOLIC BLOOD PRESSURE: 131 MMHG | RESPIRATION RATE: 40 BRPM

## 2020-03-16 LAB
EKG ATRIAL RATE: 79 BPM
EKG P AXIS: 41 DEGREES
EKG P-R INTERVAL: 140 MS
EKG Q-T INTERVAL: 372 MS
EKG QRS DURATION: 94 MS
EKG QTC CALCULATION (BAZETT): 426 MS
EKG R AXIS: 49 DEGREES
EKG T AXIS: 79 DEGREES
EKG VENTRICULAR RATE: 79 BPM

## 2020-03-16 PROCEDURE — 99213 OFFICE O/P EST LOW 20 MIN: CPT | Performed by: NURSE PRACTITIONER

## 2020-03-16 PROCEDURE — 99215 OFFICE O/P EST HI 40 MIN: CPT

## 2020-03-16 PROCEDURE — 93005 ELECTROCARDIOGRAM TRACING: CPT | Performed by: NURSE PRACTITIONER

## 2020-03-16 PROCEDURE — 93010 ELECTROCARDIOGRAM REPORT: CPT | Performed by: INTERNAL MEDICINE

## 2020-03-16 ASSESSMENT — ENCOUNTER SYMPTOMS
COUGH: 1
NAUSEA: 0
ABDOMINAL DISTENTION: 1
VOMITING: 0
SORE THROAT: 0
SHORTNESS OF BREATH: 0
ABDOMINAL PAIN: 1

## 2020-03-16 NOTE — ED PROVIDER NOTES
every morning (before breakfast)    POLYETHYLENE GLYCOL (GLYCOLAX) POWDER    Take 17 g by mouth as needed     PRAVASTATIN (PRAVACHOL) 40 MG TABLET    Take 80 mg by mouth nightly     THERAPEUTIC MULTIVITAMIN-MINERALS (THERAGRAN-M) TABLET    Take 1 tablet by mouth daily. WHEAT DEXTRIN (BENEFIBER) POWD    Take 4 g by mouth 3 times daily (with meals)       ALLERGIES     Patient is is allergic to nsaids. Patients   Immunization History   Administered Date(s) Administered    Pneumococcal Polysaccharide ([id number]) 05/31/2014       FAMILY HISTORY     Patient's family history includes Cancer in her mother. SOCIAL HISTORY     Patient  reports that she has never smoked. She has never used smokeless tobacco. She reports that she does not drink alcohol or use drugs. PHYSICAL EXAM     ED TRIAGE VITALS  BP: (!) 131/59, Temp: 98.2 °F (36.8 °C), Pulse: 85, Resp: (!) 40, SpO2: 94 %(91-94 %),Estimated body mass index is 29.7 kg/m² as calculated from the following:    Height as of 6/18/19: 5' 6\" (1.676 m). Weight as of this encounter: 184 lb (83.5 kg). ,No LMP recorded. Patient has had a hysterectomy. Physical Exam  Vitals signs and nursing note reviewed. Constitutional:       General: She is not in acute distress. Appearance: She is well-developed. She is not diaphoretic. Eyes:      Conjunctiva/sclera:      Right eye: Right conjunctiva is not injected. Left eye: Left conjunctiva is not injected. Pupils: Pupils are equal.   Neck:      Musculoskeletal: Normal range of motion. Cardiovascular:      Rate and Rhythm: Normal rate and regular rhythm. Heart sounds: No murmur. Pulmonary:      Effort: Pulmonary effort is normal. Tachypnea present. No respiratory distress. Breath sounds: Normal breath sounds. Abdominal:      General: There is distension. Tenderness: There is generalized abdominal tenderness. Musculoskeletal:      Right knee: She exhibits normal range of motion. Left knee: She exhibits normal range of motion. Skin:     General: Skin is warm. Findings: No rash. Neurological:      Mental Status: She is alert and oriented to person, place, and time. Psychiatric:         Behavior: Behavior normal.         DIAGNOSTIC RESULTS     Labs:No results found for this visit on 03/16/20. IMAGING:    No orders to display         EKG:  none    URGENT CARE COURSE:     Vitals:    03/16/20 1321   BP: (!) 131/59   Pulse: 85   Resp: (!) 40   Temp: 98.2 °F (36.8 °C)   TempSrc: Temporal   SpO2: 94%   Weight: 184 lb (83.5 kg)       Medications - No data to display         PROCEDURES:  None    FINAL IMPRESSION      1. Abdominal distention          DISPOSITION/ PLAN     Discussed with the patient and caregiver that based on the patient's abdominal distention and recent medication changes, I believe transfer to the emergency department for further evaluation is warranted. Discussed that the patient's tachypnea is likely related to the distention that she is experiencing in her abdomen. Patient and caregiver are agreeable to plan for transfer and states that she would prefer to go to [facility] ER. Report was called to Flash Lopez PA-C, was working with Dr. Bridger Moulton. Caregiver will take patient by private car directly to the ER and is agreeable to plan as discussed.       PATIENT REFERRED TO:  ARNALDO Poole CNP  [address] [address] [address]      DISCHARGE MEDICATIONS:  New Prescriptions    No medications on file       Discontinued Medications    No medications on file       Current Discharge Medication List          ARNALDO Andrade CNP    (Please note that portions of this note were completed with a voice recognition program. Efforts were made to edit the dictations but occasionally words are mis-transcribed.)          ARNALDO Andrade CNP  03/16/20 [id number]

## 2020-03-19 ENCOUNTER — HOSPITAL ENCOUNTER (EMERGENCY)
Age: 60
Discharge: HOME | End: 2020-03-19
Payer: MEDICARE

## 2020-03-19 VITALS
SYSTOLIC BLOOD PRESSURE: 131 MMHG | WEIGHT: 192 LBS | RESPIRATION RATE: 16 BRPM | BODY MASS INDEX: 30.99 KG/M2 | HEART RATE: 70 BPM | OXYGEN SATURATION: 96 % | TEMPERATURE: 98.3 F | DIASTOLIC BLOOD PRESSURE: 59 MMHG

## 2020-03-19 PROCEDURE — 99212 OFFICE O/P EST SF 10 MIN: CPT

## 2020-03-19 PROCEDURE — 99213 OFFICE O/P EST LOW 20 MIN: CPT | Performed by: NURSE PRACTITIONER

## 2020-03-19 RX ORDER — LACTULOSE 10 G/15ML
20 SOLUTION ORAL NIGHTLY
Qty: 150 ML | Refills: 0 | Status: SHIPPED | OUTPATIENT
Start: 2020-03-19 | End: 2020-03-24

## 2020-03-19 RX ORDER — GLYCERIN PEDIATRIC
1 SUPPOSITORY, RECTAL RECTAL NIGHTLY
Qty: 3 SUPPOSITORY | Refills: 0 | Status: SHIPPED | OUTPATIENT
Start: 2020-03-19 | End: 2020-03-22

## 2020-03-19 RX ORDER — AZITHROMYCIN 250 MG/1
TABLET, FILM COATED ORAL
Qty: 6 TABLET | Refills: 0 | Status: SHIPPED | OUTPATIENT
Start: 2020-03-19 | End: 2020-10-15

## 2020-03-19 RX ORDER — PREDNISONE 20 MG/1
20 TABLET ORAL DAILY
Qty: 10 TABLET | Refills: 0 | Status: SHIPPED | OUTPATIENT
Start: 2020-03-19 | End: 2020-03-24

## 2020-03-19 RX ORDER — GENTAMICIN SULFATE 3 MG/ML
1 SOLUTION/ DROPS OPHTHALMIC 4 TIMES DAILY
Qty: 1 BOTTLE | Refills: 0 | Status: SHIPPED | OUTPATIENT
Start: 2020-03-19 | End: 2020-03-24

## 2020-03-19 ASSESSMENT — ENCOUNTER SYMPTOMS
CRUSTING: 1
DOUBLE VISION: 0
COUGH: 1
EYE ITCHING: 1
SINUS PRESSURE: 1
BLIND SPOTS: 0
CONSTIPATION: 1
CHEST TIGHTNESS: 0
ANAL BLEEDING: 0
EYE WATERING: 0
WHEEZING: 0
EYE INFLAMMATION: 0
RHINORRHEA: 1
SHORTNESS OF BREATH: 0
EYE REDNESS: 0
PERI-ORBITAL EDEMA: 0
SORE THROAT: 1
ABDOMINAL PAIN: 1
VOMITING: 0
NAUSEA: 0
APNEA: 0
CHOKING: 0
STRIDOR: 0
DIARRHEA: 0
BLOOD IN STOOL: 0
EYE PAIN: 0
BLURRED VISION: 0
RECTAL PAIN: 0
ABDOMINAL DISTENTION: 0
PHOTOPHOBIA: 0

## 2020-03-19 NOTE — ED PROVIDER NOTES
[facility]      CHIEF COMPLAINT       Chief Complaint   Patient presents with    Eye Drainage     left eye redness, swelling    Constipation       Nurses Notes reviewed and I agree except as noted in the HPI. HISTORY OFPRESENT ILLNESS   Geovany Fillers is a 60 y.o. The history is provided by the patient. No  was used. Eye Problem   Location:  Left eye  Duration:  5 days  Timing:  Constant  Progression:  Worsening  Chronicity:  New  Context: not burn, not chemical exposure, not contact lens problem, not direct trauma, not foreign body, not using machinery, not scratch, not smoke exposure and not UV exposure    Context comment:  Uri  Relieved by:  Nothing  Worsened by:  Nothing  Ineffective treatments:  None tried  Associated symptoms: crusting, headaches and itching    Associated symptoms: no blurred vision, no decreased vision, no double vision, no facial rash, no inflammation, no nausea, no numbness, no photophobia, no redness, no scotomas, no swelling, no tearing, no tingling, no vomiting and no weakness    Risk factors: conjunctival hemorrhage and recent URI    Risk factors: no exposure to pinkeye, no previous injury to eye and no recent herpes zoster        REVIEW OF SYSTEMS     Review of Systems   Constitutional: Positive for fatigue. Negative for activity change, appetite change, chills, diaphoresis, fever and unexpected weight change. HENT: Positive for congestion, postnasal drip, rhinorrhea, sinus pressure and sore throat. Eyes: Positive for itching. Negative for blurred vision, double vision, photophobia, pain, redness and visual disturbance. Respiratory: Positive for cough. Negative for apnea, choking, chest tightness, shortness of breath, wheezing and stridor. Cardiovascular: Negative for chest pain, palpitations and leg swelling. Gastrointestinal: Positive for abdominal pain and constipation.  Negative for abdominal distention, anal bleeding, blood in stool, diarrhea, nausea, rectal pain and vomiting. Neurological: Positive for headaches. Negative for tingling, weakness and numbness. PAST MEDICAL HISTORY         Diagnosis Date    Arthritis     B[facility] disorder ([facility]     CHF (congestive heart failure) (HCC)     Chronic kidney disease     Coarse tremors     Constipation     COPD (chronic obstructive pulmonary disease) ([facility]     Depression     Gait difficulty     General weakness     GERD (gastroesophageal reflux disease)     Hypertension     Mental retardation     Pneumonia     Seizures ([facility]     Thyroid disease     UTI (lower urinary tract infection)        SURGICAL HISTORY     Patient  has a past surgical history that includes Hysterectomy; pr egd transoral biopsy single/multiple (Left, 2/10/2018); Upper gastrointestinal endoscopy (02/08/2018); and Colonoscopy. CURRENT MEDICATIONS       Discharge Medication List as of 3/19/2020  5:32 PM      CONTINUE these medications which have NOT CHANGED    Details   Wheat Dextrin (BENEFIBER) POWD Take 4 g by mouth 3 times daily (with meals)Historical Med      pantoprazole (PROTONIX) 40 MG tablet Take 1 tablet by mouth every morning (before breakfast), Disp-30 tablet, R-3Normal      lithium 300 MG capsule Take 300 mg by mouth nightly Historical Med      cloZAPine (CLOZARIL) 50 MG tablet Take 100 mg by mouth daily Historical Med      docusate sodium (COLACE) 100 MG capsule Take 200 mg by mouth daily Historical Med      pravastatin (PRAVACHOL) 40 MG tablet Take 80 mg by mouth nightly Historical Med      oxybutynin (DITROPAN XL) 15 MG CR tablet Take 30 mg by mouth daily Historical Med      divalproex (DEPAKOTE) 500 MG ER tablet Take 1 tablet by mouth nightly., Disp-30 tablet      calcium-vitamin D (OSCAL-500) 500-200 MG-UNIT per tablet Take 1 tablet by mouth 3 times daily. , Disp-30 tablet      aspirin 81 MG EC tablet Take 1 tablet by mouth daily. , Disp-30 tablet, R-0

## 2020-03-19 NOTE — ED TRIAGE NOTES
Patient here today with , symptoms started today, left eye redness, swelling, white drainage. Patient lives group home. Constipation.

## 2020-03-19 NOTE — ED NOTES
Patient stable condition, ambulate to lobby with caregiver. E-script,follow up with PCP with any concerns. Worse cough, any fevers, constipation, vomiting, follow up with ED. Caregiver understood instructions verbally.      Ciro Hathaway LPN  97/73/53 [id number]

## 2020-03-20 ENCOUNTER — CARE COORDINATION (OUTPATIENT)
Dept: CARE COORDINATION | Age: 60
End: 2020-03-20

## 2020-07-13 ENCOUNTER — HOSPITAL ENCOUNTER (OUTPATIENT)
Dept: NON INVASIVE DIAGNOSTICS | Age: 60
Discharge: HOME | End: 2020-07-13
Payer: MEDICARE

## 2020-07-13 LAB
LV EF: 55 %
LVEF MODALITY: NORMAL

## 2020-07-13 PROCEDURE — 93306 TTE W/DOPPLER COMPLETE: CPT

## 2020-08-07 ENCOUNTER — HOSPITAL ENCOUNTER (OUTPATIENT)
Dept: GENERAL RADIOLOGY | Age: 60
Discharge: HOME | End: 2020-08-07
Payer: MEDICARE

## 2020-08-07 ENCOUNTER — HOSPITAL ENCOUNTER (OUTPATIENT)
Age: 60
Discharge: HOME | End: 2020-08-07
Payer: MEDICARE

## 2020-08-07 LAB
ALBUMIN SERPL-MCNC: 3.8 G/DL (ref 3.5–5.1)
ALP BLD-CCNC: 59 U/L (ref 38–126)
ALT SERPL-CCNC: 11 U/L (ref 11–66)
ANION GAP SERPL CALCULATED.3IONS-SCNC: 9 MEQ/L (ref 8–16)
AST SERPL-CCNC: 15 U/L (ref 5–40)
BILIRUB SERPL-MCNC: 0.2 MG/DL (ref 0.3–1.2)
BUN BLDV-MCNC: 15 MG/DL (ref 7–22)
CALCIUM SERPL-MCNC: 9.6 MG/DL (ref 8.5–10.5)
CHLORIDE BLD-SCNC: 106 MEQ/L (ref 98–111)
CHOLESTEROL, TOTAL: 130 MG/DL (ref 100–199)
CO2: 27 MEQ/L (ref 23–33)
CREAT SERPL-MCNC: 0.6 MG/DL (ref 0.4–1.2)
ERYTHROCYTE [DISTWIDTH] IN BLOOD BY AUTOMATED COUNT: 16.2 % (ref 11.5–14.5)
ERYTHROCYTE [DISTWIDTH] IN BLOOD BY AUTOMATED COUNT: 55.7 FL (ref 35–45)
GFR SERPL CREATININE-BSD FRML MDRD: > 90 ML/MIN/1.73M2
GLUCOSE BLD-MCNC: 106 MG/DL (ref 70–108)
HCT VFR BLD CALC: 44.6 % (ref 37–47)
HDLC SERPL-MCNC: 30 MG/DL
HEMOGLOBIN: 13.3 GM/DL (ref 12–16)
LDL CHOLESTEROL CALCULATED: 58 MG/DL
LITHIUM LEVEL: 0.49 MMOL/L (ref 0.6–1.2)
MCH RBC QN AUTO: 28.1 PG (ref 26–33)
MCHC RBC AUTO-ENTMCNC: 29.8 GM/DL (ref 32.2–35.5)
MCV RBC AUTO: 94.3 FL (ref 81–99)
PLATELET # BLD: 181 THOU/MM3 (ref 130–400)
PMV BLD AUTO: 9.6 FL (ref 9.4–12.4)
POTASSIUM SERPL-SCNC: 4.3 MEQ/L (ref 3.5–5.2)
RBC # BLD: 4.73 MILL/MM3 (ref 4.2–5.4)
SODIUM BLD-SCNC: 142 MEQ/L (ref 135–145)
T4 FREE: 1.18 NG/DL (ref 0.93–1.76)
TOTAL PROTEIN: 7.6 G/DL (ref 6.1–8)
TRIGL SERPL-MCNC: 208 MG/DL (ref 0–199)
TSH SERPL DL<=0.05 MIU/L-ACNC: 3.19 UIU/ML (ref 0.4–4.2)
VALPROIC ACID LEVEL: 45.1 UG/ML (ref 50–100)
WBC # BLD: 6.2 THOU/MM3 (ref 4.8–10.8)

## 2020-08-07 PROCEDURE — 80061 LIPID PANEL: CPT

## 2020-08-07 PROCEDURE — 84439 ASSAY OF FREE THYROXINE: CPT

## 2020-08-07 PROCEDURE — 71046 X-RAY EXAM CHEST 2 VIEWS: CPT

## 2020-08-07 PROCEDURE — 85027 COMPLETE CBC AUTOMATED: CPT

## 2020-08-07 PROCEDURE — 80053 COMPREHEN METABOLIC PANEL: CPT

## 2020-08-07 PROCEDURE — 80164 ASSAY DIPROPYLACETIC ACD TOT: CPT

## 2020-08-07 PROCEDURE — 84443 ASSAY THYROID STIM HORMONE: CPT

## 2020-08-07 PROCEDURE — 80178 ASSAY OF LITHIUM: CPT

## 2020-08-07 PROCEDURE — 36415 COLL VENOUS BLD VENIPUNCTURE: CPT

## 2020-10-15 ENCOUNTER — OFFICE VISIT (OUTPATIENT)
Dept: CARDIOLOGY CLINIC | Age: 60
End: 2020-10-15
Payer: MEDICARE

## 2020-10-15 VITALS
WEIGHT: 188.8 LBS | BODY MASS INDEX: 30.34 KG/M2 | SYSTOLIC BLOOD PRESSURE: 131 MMHG | HEIGHT: 66 IN | HEART RATE: 99 BPM | DIASTOLIC BLOOD PRESSURE: 76 MMHG

## 2020-10-15 PROCEDURE — G8417 CALC BMI ABV UP PARAM F/U: HCPCS | Performed by: INTERNAL MEDICINE

## 2020-10-15 PROCEDURE — 1036F TOBACCO NON-USER: CPT | Performed by: INTERNAL MEDICINE

## 2020-10-15 PROCEDURE — G8427 DOCREV CUR MEDS BY ELIG CLIN: HCPCS | Performed by: INTERNAL MEDICINE

## 2020-10-15 PROCEDURE — G8484 FLU IMMUNIZE NO ADMIN: HCPCS | Performed by: INTERNAL MEDICINE

## 2020-10-15 PROCEDURE — 99213 OFFICE O/P EST LOW 20 MIN: CPT | Performed by: INTERNAL MEDICINE

## 2020-10-15 PROCEDURE — 3017F COLORECTAL CA SCREEN DOC REV: CPT | Performed by: INTERNAL MEDICINE

## 2020-10-15 RX ORDER — LACTOBACILLUS RHAMNOSUS GG 10B CELL
1 CAPSULE ORAL DAILY
COMMUNITY

## 2020-10-15 RX ORDER — ASPIRIN 325 MG
325 TABLET ORAL DAILY
COMMUNITY

## 2020-10-15 RX ORDER — CALCITONIN SALMON 200 [IU]/.09ML
1 SPRAY, METERED NASAL DAILY
COMMUNITY

## 2020-10-15 RX ORDER — SELENIUM 50 MCG
1 TABLET ORAL DAILY
COMMUNITY

## 2020-10-15 RX ORDER — LUBIPROSTONE 24 UG/1
24 CAPSULE, GELATIN COATED ORAL 2 TIMES DAILY WITH MEALS
COMMUNITY
End: 2021-03-22

## 2020-10-15 NOTE — PROGRESS NOTES
[address] [place] [address],[place] [address] [address] [place] [address]  Dept: [phone]  Dept Fax: [phone]  Loc: [phone]    Visit Date: 10/15/2020    Ms. Rita Barajas is a 63 y.o. female  who presented for:  Chief Complaint   Patient presents with    1 Year Follow Up       HPI:   64 yo F c hx of developmental delay, moderate periocardial effusion, CHF, COPD is here for a follow up. Seen Dr. Ara Carpenter and Kashif Putnam, patietn cannot provide much history. Denies any chest pain, sob, palpitations, lightheadedness, dizziness, orthopnea, PND or pedal edema.            Current Outpatient Medications:     Lactobacillus (ACIDOPHILUS) CAPS capsule, Take 1 capsule by mouth daily, Disp: , Rfl:     lubiprostone (AMITIZA) 24 MCG capsule, Take 24 mcg by mouth 2 times daily (with meals), Disp: , Rfl:     calcitonin (MIACALCIN) 200 UNIT/ACT nasal spray, 1 spray by Nasal route daily, Disp: , Rfl:     lactobacillus (CULTURELLE) capsule, Take 1 capsule by mouth daily, Disp: , Rfl:     aspirin 325 MG tablet, Take 325 mg by mouth daily, Disp: , Rfl:     Wheat Dextrin (BENEFIBER) POWD, Take 4 g by mouth 3 times daily (with meals), Disp: , Rfl:     pantoprazole (PROTONIX) 40 MG tablet, Take 1 tablet by mouth every morning (before breakfast), Disp: 30 tablet, Rfl: 3    lithium 300 MG capsule, Take 300 mg by mouth nightly , Disp: , Rfl:     cloZAPine (CLOZARIL) 50 MG tablet, Take 100 mg by mouth daily , Disp: , Rfl:     docusate sodium (COLACE) 100 MG capsule, Take 200 mg by mouth daily , Disp: , Rfl:     pravastatin (PRAVACHOL) 40 MG tablet, Take 80 mg by mouth nightly , Disp: , Rfl:     melatonin 3 MG TABS tablet, Take 5 mg by mouth daily , Disp: , Rfl:     oxybutynin (DITROPAN XL) 15 MG CR tablet, Take 30 mg by mouth daily , Disp: , Rfl:     divalproex (DEPAKOTE) 500 MG ER tablet, Take 1 tablet by mouth nightly., Disp: 30 tablet, Rfl:     calcium-vitamin D (OSCAL-500) 500-200 MG-UNIT per tablet, Take 1 tablet by mouth 3 times daily. , Disp: 30 tablet, Rfl:     acetaminophen (TYLENOL) 325 MG tablet, Take 650 mg by mouth every 4 hours as needed for Pain or Fever., Disp: , Rfl:     polyethylene glycol (GLYCOLAX) powder, Take 17 g by mouth daily , Disp: , Rfl:     chlorhexidine (PERIDEX) 0.12 % solution, Take 15 mLs by mouth 2 times daily. , Disp: , Rfl:     fludrocortisone (FLORINEF) 0.1 MG tablet, Take 0.1 mg by mouth daily. , Disp: , Rfl:     levothyroxine (SYNTHROID) 125 MCG tablet, Take 137 mcg by mouth Daily , Disp: , Rfl:     therapeutic multivitamin-minerals (THERAGRAN-M) tablet, Take 1 tablet by mouth daily. , Disp: , Rfl:     Past Medical History  Ena Councilman  has a past medical history of Arthritis, Bipolar disorder ([address] CHF (congestive heart failure) ([address] Chronic kidney disease, Coarse tremors, Constipation, COPD (chronic obstructive pulmonary disease) ([address] Depression, Gait difficulty, General weakness, GERD (gastroesophageal reflux disease), Hypertension, Mental retardation, Pneumonia, Seizures ([address] Thyroid disease, and UTI (lower urinary tract infection). Social History  Ena Councilman  reports that she has never smoked. She has never used smokeless tobacco. She reports that she does not drink alcohol or use drugs. Family History  Ena Councilman family history includes Cancer in her mother. Past Surgical History   Past Surgical History:   Procedure Laterality Date    COLONOSCOPY      HYSTERECTOMY      OK EGD TRANSORAL BIOPSY SINGLE/MULTIPLE Left 2/10/2018    EGD BIOPSY performed by Piper Fonseca MD at [facility]    UPPER GASTROINTESTINAL ENDOSCOPY  02/08/2018    [facility]       Subjective:     REVIEW OF SYSTEMS  Constitutional: denies sweats, chills and fever  HENT: denies  congestion, sinus pressure, sneezing and sore throat. Eyes: denies  pain, discharge, redness and itching.    Respiratory: denies apnea, cough  Gastrointestinal: denies blood in stool, constipation, diarrhea   Endocrine: denies cold intolerance, heat intolerance, polydipsia. Genitourinary: denies dysuria, enuresis, flank pain and hematuria. Musculoskeletal: denies arthralgias, joint swelling and neck pain. Neurological: denies numbness and headaches. Psychiatric/Behavioral: denies agitation, confusion, decreased concentration and dysphoric mood    All others reviewed and are negative. Objective:     /76   Pulse 99   Ht 5' 6\" (1.676 m)   Wt 188 lb 12.8 oz (85.6 kg)   BMI 30.47 kg/m²     Wt Readings from Last 3 Encounters:   10/15/20 188 lb 12.8 oz (85.6 kg)   03/19/20 192 lb (87.1 kg)   03/16/20 184 lb (83.5 kg)     BP Readings from Last 3 Encounters:   10/15/20 131/76   03/19/20 (!) 131/59   03/16/20 (!) 131/59       PHYSICAL EXAM  Constitutional: Oriented to person, place, and time. Appears well-developed and well-nourished. HENT:   Head: Normocephalic and atraumatic. Eyes: EOM are normal. Pupils are equal, round, and reactive to light. Neck: Normal range of motion. Neck supple. No JVD present. Cardiovascular: Normal rate , normal heart sounds and intact distal pulses. Pulmonary/Chest: Effort normal and breath sounds normal. No respiratory distress. No wheezes. No rales. Abdominal: Soft. Bowel sounds are normal. No distension. There is no tenderness. Musculoskeletal: Normal range of motion. No edema. Neurological: Alert and oriented to person, place, and time. No cranial nerve deficit. Coordination normal.   Skin: Skin is warm and dry. Psychiatric: Normal mood and affect.        Lab Results   Component Value Date    CKTOTAL 111 03/26/2017       Lab Results   Component Value Date    WBC 6.2 08/07/2020    RBC 4.73 08/07/2020    RBC 4.83 02/04/2020    HGB 13.3 08/07/2020    HCT 44.6 08/07/2020    MCV 94.3 08/07/2020    MCH 28.1 08/07/2020    MCHC 29.8 08/07/2020    RDW 17.4 07/21/2020     08/07/2020    MPV 9.6 08/07/2020       Lab Results Component Value Date     08/07/2020    K 4.3 08/07/2020    K 4.5 02/09/2018     08/07/2020    CO2 27 08/07/2020    BUN 15 08/07/2020    LABALBU 3.8 08/07/2020    CREATININE 0.6 08/07/2020    CALCIUM 9.6 08/07/2020    LABGLOM >90 08/07/2020    GLUCOSE 106 08/07/2020    GLUCOSE 150 07/21/2020       Lab Results   Component Value Date    ALKPHOS 59 08/07/2020    ALT 11 08/07/2020    AST 15 08/07/2020    PROT 7.6 08/07/2020    BILITOT 0.2 08/07/2020    BILITOT Negative 08/01/2019    BILIDIR <0.1 06/13/2019    LABALBU 3.8 08/07/2020       Lab Results   Component Value Date    MG 2.1 02/06/2018       Lab Results   Component Value Date    INR 1.14 07/21/2020    INR 1.03 06/13/2019    INR 1.03 02/11/2018    PROTIME 13.1 07/21/2020    PROTIME 11.9 06/13/2019         Lab Results   Component Value Date    LABA1C 5.7 02/17/2020       Lab Results   Component Value Date    TRIG 208 08/07/2020    HDL 30 08/07/2020    LDLCALC 58 08/07/2020    LABVLDL 43 04/16/2020       Lab Results   Component Value Date    TSH 3.190 08/07/2020         Testing Reviewed:      I haveindividually reviewed the below cardiac tests    EKG:    ECHO:   Results for orders placed during the hospital encounter of 07/13/20   Echo 2D w doppler w color complete    Narrative Transthoracic Echocardiography Report (TTE)     Demographics      Patient Name   [facility]          Gender              Female                  Ishmael LOUIE      MR #           [id number]        Race                                                      Ethnicity      Account #      [de-identified]        Room Number      Accession      [id number]        Date of Study       07/13/2020   Number      Date of Birth  1960       Referring Physician Yonatan Love, [address] [place] [address]      Age            63 year(s)       [address] Interpreting        Carosn Black MD                                   Physician     Procedure    Type of Study      TTE procedure:ECHOCARDIOGRAM COMPLETE 2D W DOPPLER W COLOR. Procedure Date  Date: 07/13/2020 Start: 10:08 AM    Study Location: Bedside  Technical Quality: Limited visualization due to poor acoustical window. Indications:Pericardial effusion. Additional Medical History:Hypothyroidism, hypoxia, congestive heart failure    Patient Status: Routine    Height: 66 inches Weight: 192 pounds BSA: 1.97 m^2 BMI: 30.99 kg/m^2    BP: 131/59 mmHg     Conclusions      Summary   Ejection fraction is visually estimated at 55%. Overall left ventricular function is normal.   Normal right ventricular size and function. The aortic valve leaflets were not well visualized. Aortic valve appears tricuspid. Aortic valve leaflets are somewhat thickened. Aortic valve leaflets are mildly calcified. Moderate aortic stenosis with mean gradient 24 mm Hg and peak velocity 3.2   m/s   Mild tricuspid regurgitation. Moderate localized pericardial effusion posteriorly (measuring 1.88 cm)   with no obvious tamponade physiology. Signature      ----------------------------------------------------------------   Electronically signed by Carson Black MD (Interpreting   physician) on 07/14/2020 at 09:47 AM   ----------------------------------------------------------------      Findings      Mitral Valve   Structurally normal mitral valve. Aortic Valve   The aortic valve leaflets were not well visualized. Aortic valve appears tricuspid. Aortic valve leaflets are somewhat thickened. Aortic valve leaflets are mildly calcified. Moderate aortic stenosis with mean gradient 24 mm Hg and peak velocity 3.2   m/s   Mild aortic regurgitation is noted. Tricuspid Valve   Tricuspid valve was not well visualized.    Tricuspid valve is structurally normal.   Mild tricuspid TV Peak Gradient: 0.41 mmHg   MV P1/2t: 81 msec   (Continuity):0.82 cm^2   TR Velocity:271 cm/s   MVA by PHT:2.72                              TR Gradient:29.38 mmHg   cm^2                LVOT VTI: 23.5 cm        PV Peak Velocity: 87.8 cm/s                       AV P1/2t: 703 msec       PV Peak Gradient: 3.08 mmHg   MV E' Septal        IVRT: 92 msec   Velocity: 5.2 cm/s   MV A' Septal   Velocity: 6.7 cm/s  AV DVI (VTI): 0.32AV DVI   MV E' Lateral       (Vmax):0.36   Velocity: 4.8 cm/s   MV A' Lateral   Velocity: 9.1 cm/s   E/E' septal: 17.06   E/E' lateral: 18.48     [email or web address] [id number]       STRESS:    CATH:    Assessment/Plan       Diagnosis Orders   1. Pericardial effusion       Moderate pericardial effusion  Multiple pneumonia  COPD  Developmental delay    Patient is present with her   Denies any symptoms  Agreed by the coordinator  Had moderate effusion in 2019 also  No changes  Does have some mild to mod AS  Continue rest of th emanagement  The patient is asked to make an attempt to improve diet and exercise patterns to aid in medical management of this problem. Advised more plant based nutrition/meditarrean diet   Advised patient to call office or seek immediate medical attention if there is any new onset of  any chest pain, sob, palpitations, lightheadedness, dizziness, orthopnea, PND or pedal edema. All medication side effects were discussed in details. F/u with carmen anguiano    Thank youfor allowing me to participate in the care of this patient. Please do not hesitate to contact me for any further questions. Return in about 1 year (around 10/15/2021) for Regular follow up, Review testing.        Electronically signed by Diamond Phoenix, MD [facility]  10/15/2020 at 9:29 AM EDT

## 2020-11-03 PROBLEM — J18.9 PNEUMONIA DUE TO INFECTIOUS ORGANISM: Status: RESOLVED | Noted: 2020-11-03 | Resolved: 2020-11-03

## 2021-05-03 PROBLEM — K29.70 GASTRITIS WITHOUT BLEEDING: Status: ACTIVE | Noted: 2021-05-03

## 2021-05-03 PROBLEM — K59.04 CHRONIC IDIOPATHIC CONSTIPATION: Status: ACTIVE | Noted: 2021-05-03

## 2021-06-01 ENCOUNTER — HOSPITAL ENCOUNTER (OUTPATIENT)
Age: 61
Discharge: HOME | End: 2021-06-01
Payer: MEDICARE

## 2021-06-01 ENCOUNTER — HOSPITAL ENCOUNTER (OUTPATIENT)
Dept: GENERAL RADIOLOGY | Age: 61
Discharge: HOME | End: 2021-06-01
Payer: MEDICARE

## 2021-06-01 DIAGNOSIS — K59.04 CHRONIC IDIOPATHIC CONSTIPATION: ICD-10-CM

## 2021-06-01 PROCEDURE — 74018 RADEX ABDOMEN 1 VIEW: CPT

## 2021-06-01 PROCEDURE — 6370000000 HC RX 637 (ALT 250 FOR IP): Performed by: NURSE PRACTITIONER

## 2021-06-01 RX ADMIN — Medication 3 CAPSULE: at 11:49

## 2021-06-04 ENCOUNTER — HOSPITAL ENCOUNTER (OUTPATIENT)
Age: 61
Discharge: HOME | End: 2021-06-04
Payer: MEDICARE

## 2021-06-04 ENCOUNTER — HOSPITAL ENCOUNTER (OUTPATIENT)
Dept: GENERAL RADIOLOGY | Age: 61
Discharge: HOME | End: 2021-06-04
Payer: MEDICARE

## 2021-06-04 DIAGNOSIS — K59.04 CHRONIC IDIOPATHIC CONSTIPATION: ICD-10-CM

## 2021-06-04 PROCEDURE — 74018 RADEX ABDOMEN 1 VIEW: CPT

## 2021-06-07 ENCOUNTER — HOSPITAL ENCOUNTER (OUTPATIENT)
Dept: GENERAL RADIOLOGY | Age: 61
Discharge: HOME | End: 2021-06-07
Payer: MEDICARE

## 2021-06-07 ENCOUNTER — HOSPITAL ENCOUNTER (OUTPATIENT)
Age: 61
Discharge: HOME | End: 2021-06-07
Payer: MEDICARE

## 2021-06-07 DIAGNOSIS — K59.04 CHRONIC IDIOPATHIC CONSTIPATION: ICD-10-CM

## 2021-06-07 PROCEDURE — 74018 RADEX ABDOMEN 1 VIEW: CPT

## 2021-06-10 ENCOUNTER — HOSPITAL ENCOUNTER (OUTPATIENT)
Dept: GENERAL RADIOLOGY | Age: 61
Discharge: HOME | End: 2021-06-10
Payer: MEDICARE

## 2021-06-10 ENCOUNTER — HOSPITAL ENCOUNTER (OUTPATIENT)
Age: 61
Discharge: HOME | End: 2021-06-10
Payer: MEDICARE

## 2021-06-10 DIAGNOSIS — K59.04 CHRONIC IDIOPATHIC CONSTIPATION: ICD-10-CM

## 2021-06-10 PROCEDURE — 74018 RADEX ABDOMEN 1 VIEW: CPT

## 2021-06-11 NOTE — RESULT ENCOUNTER NOTE
The Sitz marker study revealed poor progression of markers beyond the transverse colon. She likely has an atonic colon. Please check with her caregiver regarding a surgical referral.  If they are agreeable to the referral, I will place the order.

## 2021-06-20 PROBLEM — K59.01 SLOW TRANSIT CONSTIPATION: Status: ACTIVE | Noted: 2021-06-20

## 2021-06-20 PROBLEM — E66.9 CLASS 1 OBESITY WITHOUT SERIOUS COMORBIDITY WITH BODY MASS INDEX (BMI) OF 30.0 TO 30.9 IN ADULT: Status: ACTIVE | Noted: 2021-06-20

## 2021-06-20 PROBLEM — K80.20 CALCULUS OF GALLBLADDER WITHOUT CHOLECYSTITIS WITHOUT OBSTRUCTION: Status: ACTIVE | Noted: 2021-06-20

## 2021-06-20 PROBLEM — E66.811 CLASS 1 OBESITY WITHOUT SERIOUS COMORBIDITY WITH BODY MASS INDEX (BMI) OF 30.0 TO 30.9 IN ADULT: Status: ACTIVE | Noted: 2021-06-20

## 2021-06-20 PROBLEM — Z87.19 HISTORY OF GASTRITIS: Status: ACTIVE | Noted: 2021-06-20

## 2021-07-13 NOTE — PROGRESS NOTES
Kori Moody. [facility], [address] [place] [id number]  [phone]  New Patient Evaluation in Office    Pt Name: Sloane Pastor  Date of Birth 1960   Today's Date: 7/20/2021  Medical Record Number: [id number]  Referring Provider: ARNALDO Majano - *  Primary Care Provider: ARNALDO Aguayo - CNP  Chief Complaint   Patient presents with    Surgical Consult     new patient- ref Maria L Gamboa CNP- slow transit constipation      ASSESSMENT       Diagnosis Orders   1. Slow transit constipation       Past Medical History:   Diagnosis Date    Arthritis     Bipolar disorder ([address]     CHF (congestive heart failure) (HCC)     Chronic kidney disease     Coarse tremors     Constipation     COPD (chronic obstructive pulmonary disease) ([address]     Depression     Gait difficulty     General weakness     GERD (gastroesophageal reflux disease)     Hypertension     Mental retardation     Pneumonia     Seizures ([address]     Thyroid disease     UTI (lower urinary tract infection)           PLANS      1. Continue current medical regimen per GI recommendations  2. Increase water intake, encourage healthy diet with fiber intake  3. Discussed potential surgical intervention with colectomy vs diverting ileostomy, but would like to optimize medical management first as this is a large surgery involving ileostomy and caregiver is concerned about how patient will tolerate this. 4. Advised patient to follow up is symptoms persist or worsen and will further plan for surgical intervention at that time. Brian Lantigua is a 57 y.o. female seen in the consultation for evaluation a change in bowel habits. Symptoms have been occuring for 1 year(s). Caregiver, Deshawn Ferris, assists in providing history. Patient follows with GI for slow transit constipation. She is referred by Eileen Moura CNP for consultation of possible colon resection.  Recent Sitz marker study revealed essentially no progression of markers beyond the ascending and transverse colon from day 7 to 10, likely suggesting atonic colon. Patient's current regimen is Linzess, Colace, Miralax and fiber supplement daily. Constipation:  Stool pattern has been 1 formed and large stool(s) every 1-2 weeks. Defecation has been difficult and with straining. The patient has noted occasional blood on the toilet tissue. Symptoms have gradually worsened and is now stable since onset. Current regimen have been ineffective. Dietary habits: Current Health Habits: Eating fiber? yes, amt Benefiber daily Exercise?no Water intake? Not a water drinker. Past Medical History  Past Medical History:   Diagnosis Date    Arthritis     Bipolar disorder ([address]     CH[facility]) [facility])     Chronic kidney disease     Coarse tremors     Constipation     COPD (chronic obstructive pulmonary disease) ([facility])     Depression     Gait difficulty     General weakness     GERD (gastroesophageal reflux disease)     Hypertension     Mental retardation     Pneumonia     [facility]     Thyroid disease     UTI (lower urinary tract infection)      Past Surgical History  Past Surgical History:   Procedure Laterality Date    COLONOSCOPY      HYSTERECTOMY      UT EGD TRANSORAL BIOPSY SINGLE/MULTIPLE Left 2/10/2018    EGD BIOPSY performed by Karoline Mcduffie MD at [address] [facility]    UPPER GASTROINTESTINAL ENDOSCOPY  02/08/2018    [facility]     Medications  Current Outpatient Medications   Medication Sig Dispense Refill    pantoprazole (PROTONIX) 40 MG tablet Take 1 tablet by mouth every morning (before breakfast) 30 tablet 5    docusate sodium (COLACE) 100 MG capsule Take 2 capsules by mouth Daily with supper 60 capsule 5    barium sulfate (SITZMARKS RADIOPAQUE MARKERS) CAPS Take 1 capsule by mouth ONCE PRN for Other 1 capsule 0    benzonatate (TESSALON) 100 MG capsule Take 100 mg by mouth 3 times daily as needed for Cough      Magnesium Hydroxide (MILK OF MAGNESIA PO) Take 30 mLs by mouth daily      Lactobacillus (ACIDOPHILUS) CAPS capsule Take 1 capsule by mouth daily      calcitonin (MIACALCIN) 200 UNIT/ACT nasal spray 1 spray by Nasal route daily      lactobacillus (CULTURELLE) capsule Take 1 capsule by mouth daily      aspirin 325 MG tablet Take 325 mg by mouth daily      Wheat Dextrin (BENEFIBER) POWD Take 4 g by mouth 3 times daily (with meals)      lithium 300 MG capsule Take 300 mg by mouth nightly       cloZAPine (CLOZARIL) 50 MG tablet Take 100 mg by mouth daily       pravastatin (PRAVACHOL) 40 MG tablet Take 80 mg by mouth nightly       melatonin 3 MG TABS tablet Take 5 mg by mouth daily       oxybutynin (DITROPAN XL) 15 MG CR tablet Take 30 mg by mouth daily       divalproex (DEPAKOTE) 500 MG ER tablet Take 1 tablet by mouth nightly. 30 tablet     calcium-vitamin D (OSCAL-500) 500-200 MG-UNIT per tablet Take 1 tablet by mouth 3 times daily. 30 tablet     acetaminophen (TYLENOL) 325 MG tablet Take 650 mg by mouth every 4 hours as needed for Pain or Fever.  polyethylene glycol (GLYCOLAX) powder Take 17 g by mouth daily       chlorhexidine (PERIDEX) 0.12 % solution Take 15 mLs by mouth 2 times daily.  fludrocortisone (FLORINEF) 0.1 MG tablet Take 0.1 mg by mouth daily.  levothyroxine (SYNTHROID) 125 MCG tablet Take 137 mcg by mouth Daily       therapeutic multivitamin-minerals (THERAGRAN-M) tablet Take 1 tablet by mouth daily.  linaclotide (LINZESS) 290 MCG CAPS capsule Take 1 capsule by mouth every morning (before breakfast) 30 capsule 3     No current facility-administered medications for this visit. Allergies  is allergic to nsaids. Family History  family history includes Cancer in her mother. Social History   reports that she has never smoked.  She has never used smokeless tobacco. She reports that she does not drink alcohol and does not use drugs. Health Screening Exams  Health Maintenance   Topic Date Due    Hepatitis C screen  Never done    HIV screen  Never done    DTaP/Tdap/Td vaccine (1 - Tdap) Never done    Cervical cancer screen  Never done    Shingles Vaccine (1 of 2) Never done    Annual Wellness Visit (AWV)  Never done    A1C test (Diabetic or Prediabetic)  02/17/2021    Flu vaccine (1) 09/01/2021    Lipid screen  03/17/2022    TSH testing  05/19/2022    Breast cancer screen  09/14/2022    Pneumococcal 0-64 years Vaccine (2 of 2 - PPSV23) 03/11/2026    Colon cancer screen colonoscopy  03/23/2028    COVID-19 Vaccine  Completed    Hepatitis A vaccine  Aged Out    Hepatitis B vaccine  Aged Out    Hib vaccine  Aged Out    Meningococcal (ACWY) vaccine  Aged Out     Review of Systems  Review of Systems   Constitutional: Negative. HENT: Negative. Eyes: Negative. Respiratory: Negative. Cardiovascular: Negative. Gastrointestinal: Positive for constipation. Negative for abdominal distention, abdominal pain, anal bleeding, blood in stool, diarrhea, nausea, rectal pain and vomiting. Endocrine: Negative. Genitourinary: Negative. Musculoskeletal: Negative. Skin: Negative. Allergic/Immunologic: Negative. Neurological: Negative. Hematological: Negative. Psychiatric/Behavioral: Negative. OBJECTIVE    VITALS:  height is 5' 7\" (1.702 m) and weight is 187 lb 14.4 oz (85.2 kg). Her temporal temperature is 96.6 °F (35.9 °C). Her blood pressure is 120/68 and her pulse is 61. Her respiration is 16 and oxygen saturation is 97%. Pain Score:   0 - No pain Body mass index is 29.43 kg/m². CONSTITUTIONAL: Alert and oriented times 3, no acute distress and cooperative to examination with proper mood and affect. SKIN: Skin color, texture, turgor normal. No rashes or lesions. LYMPH: no cervical nodes, no inguinal nodes  HEENT: Head is normocephalic, atraumatic. EOMI, PERRLA.   NECK: Supple, symmetrical, trachea midline, no adenopathy, thyroid symmetric, not enlarged and no tenderness, skin normal.  CHEST/LUNGS: chest symmetric with normal A/P diameter, normal respiratory rate and rhythm, lungs clear to auscultation without wheezes, rales or rhonchi. No accessory muscle use. Scars None   CARDIOVASCULAR: Heart sounds are normal.  Regular rate and rhythm without murmur, gallop or rub. Normal S1 and S2. Carotid and femoral pulses 2+/4 and equal bilaterally. ABDOMEN: Mildly distended No scar(s) present. Normal bowel sounds. No bruits. soft, nontender, nondistended, no masses or organomegaly. no evidence of hernia. Percussion: Normal without hepatosplenomegally. RECTAL: deferred, not clinically indicated  NEUROLOGIC: There are no focalizing motor or sensory deficits. CN II-XII are grossly intact. Shaaron Money EXTREMITIES: no cyanosis, no clubbing and no edema. Thank you for the interesting evaluation. Further recommendations as listed above.        Electronically signed by Paula Lockett MD on 7/20/2021 at 12:49 PM

## 2021-07-20 ENCOUNTER — OFFICE VISIT (OUTPATIENT)
Dept: SURGERY | Age: 61
End: 2021-07-20
Payer: MEDICARE

## 2021-07-20 VITALS
WEIGHT: 187.9 LBS | HEART RATE: 61 BPM | DIASTOLIC BLOOD PRESSURE: 68 MMHG | TEMPERATURE: 96.6 F | SYSTOLIC BLOOD PRESSURE: 120 MMHG | RESPIRATION RATE: 16 BRPM | HEIGHT: 67 IN | OXYGEN SATURATION: 97 % | BODY MASS INDEX: 29.49 KG/M2

## 2021-07-20 DIAGNOSIS — K59.01 SLOW TRANSIT CONSTIPATION: Primary | ICD-10-CM

## 2021-07-20 PROCEDURE — G8427 DOCREV CUR MEDS BY ELIG CLIN: HCPCS | Performed by: SURGERY

## 2021-07-20 PROCEDURE — 99203 OFFICE O/P NEW LOW 30 MIN: CPT | Performed by: SURGERY

## 2021-07-20 PROCEDURE — G8417 CALC BMI ABV UP PARAM F/U: HCPCS | Performed by: SURGERY

## 2021-07-20 ASSESSMENT — ENCOUNTER SYMPTOMS
ALLERGIC/IMMUNOLOGIC NEGATIVE: 1
RECTAL PAIN: 0
BLOOD IN STOOL: 0
DIARRHEA: 0
CONSTIPATION: 1
RESPIRATORY NEGATIVE: 1
EYES NEGATIVE: 1
ANAL BLEEDING: 0
VOMITING: 0
ABDOMINAL PAIN: 0
ABDOMINAL DISTENTION: 0
NAUSEA: 0

## 2021-07-20 NOTE — PROGRESS NOTES
Subjective:      Patient ID: Santos Mercado is a 57 y.o. female. Chief Complaint   Patient presents with    Surgical Consult     new patient- ref Obi Cuevaskush CNP- slow transit constipation          HPI    Review of Systems   Constitutional: Negative. HENT: Negative. Eyes: Negative. Respiratory: Negative. Cardiovascular: Negative. Gastrointestinal: Positive for constipation. Negative for abdominal distention, abdominal pain, anal bleeding, blood in stool, diarrhea, nausea, rectal pain and vomiting. Endocrine: Negative. Genitourinary: Negative. Musculoskeletal: Negative. Skin: Negative. Allergic/Immunologic: Negative. Neurological: Negative. Hematological: Negative. Psychiatric/Behavioral: Negative. There were no vitals taken for this visit.       Objective:   Physical Exam    Assessment:            Plan:              Concepcion Mcknight

## 2021-09-06 NOTE — PROGRESS NOTES
[facility] initial consultation note    Diamond Pierre                                                Chief complaint: Diamond Pierre is a 57 y. o.oldfemale came for further evaluation regarding her ?sleep apnea  with referral from ARNALDO Flores CNP. Patient currently staying at a group home. She is a poor historian. Majority of the history obtained from patient's caregiver Ms. Zoraida Vazquez. She was accompanied to my clinic by Ms. Rocio Rob caregiver. [place]:    Sleep/Wake schedule:  Usual time to go to bed during the work/regular day of week: 8 PM to 9 PM  Usual time to wake up during the work//regular day of week: 7 AM to 8 AM  Over the weekends her sleep schedule: [x] Remain same. She usually falls a sleep in less than: 10-20 minutes. She takes naps: Yes. Number of naps per week: She takes naps 3-4 times a week  During each nap she spends a total of: 1 to 4 hours. The naps were reported as refreshing: No.  She is currently using 2 L of oxygen at nighttime. However patient is not keeping her oxygen at nighttime. Sleep Hygiene:    Is the temperature and evironment in her bed room is acceptable to her: Yes. She watches Television in her bed room: No.  She read books, study, pay bills etc in the bed: No.  Frequency She wake up during night/sleep: 3 times  Majority of nocturnal awakenings are for urination: Yes.   Sometimes  Difficulty in falling back to sleep after nocturnal awakenings: No  .  Do you drink coffee: No.  Do you drink caffeinated beverages i.e sodas: No.     Do you drink tea:No.   Do you drink alcoholic beverages: No.   History of recreational drug use: No.     History of tobacco smoking:No.        Sleep apnea symptoms:  Noticed to have loud snoring:No.   Witnessed apneas during sleep noticed: No.   History of choking and gasping sensation at night time: No.  History of headaches in the morning:Yes. History of dry mouth in the morning: Yes. History of palpitations during night time/nocturnal awakenings: Yes. History of sweating during night time/nocturnal awakenings: No    General:  History of head injury in the past: No.  She was diagnosed with mental disability. She never had a head injury  Associated loss of consciousness with head injury: No.  History of seizures: No.   Rest less legs syndrome symptoms:NO  History suggestive of periodic limb movements during sleep: NO  History suggestive of hypnagogic hallucinations: NO  History suggestive of hypnopompic hallucinations: NO  History suggestive of sleep talking:NO  History suggestive of sleep walking:NO  History suggestive of bruxism: No.    History suggestive of cataplexy: NO  History suggestive of sleep paralysis: NO    Family history of sleep disorders:  No family history available. History regarding old sleep studies:  Prior history of sleep study: No.  Using CPAP device: No.  Currently using home Oxygen: NO.    2LPM.    Patient considerations:  Is the patient is ambulatory: Yes  Patient is currently using: None of these Wheelchair, Arbutus Showman or Bulah Found.   Para/Quadriplegic: NO  Hearing deficit : NO  Claustrophobic: NO  MDD : NO  Blind: NO  Incontinent: NO  Para/Quadraplegi: NO.   Need transportation to and from Sleep Center:NO      Social History:  Social History     Tobacco Use    Smoking status: Never Smoker    Smokeless tobacco: Never Used   Vaping Use    Vaping Use: Never used   Substance Use Topics    Alcohol use: No    Drug use: No   .  She is currently working: No.                              Past Medical History:   Diagnosis Date    Arthritis     [facility]     [facility]     Chronic kidney disease     Coarse tremors     Constipation     COPD (chronic obstructive pulmonary disease) (HCC)     Depression     Gait difficulty     General weakness     GERD (gastroesophageal reflux disease)     Hypertension     Mental retardation     Pneumonia     [facility]     Thyroid disease     UTI (lower urinary tract infection)        Past Surgical History:   Procedure Laterality Date    COLONOSCOPY      HYSTERECTOMY      NE EGD TRANSORAL BIOPSY SINGLE/MULTIPLE Left 2/10/2018    EGD BIOPSY performed by Abdon Negron MD at [address]  02/08/2018    [facility]       Allergies   Allergen Reactions    Nsaids      Interacts with psych meds       Current Outpatient Medications   Medication Sig Dispense Refill    linaclotide (LINZESS) 290 MCG CAPS capsule Take 1 capsule by mouth every morning (before breakfast) 30 capsule 3    pantoprazole (PROTONIX) 40 MG tablet Take 1 tablet by mouth every morning (before breakfast) 30 tablet 5    barium sulfate (Adometry By GoogleZMARKS RADIOPAQUE MARKERS) CAPS Take 1 capsule by mouth ONCE PRN for Other 1 capsule 0    benzonatate (TESSALON) 100 MG capsule Take 100 mg by mouth 3 times daily as needed for Cough      Magnesium Hydroxide (MILK OF MAGNESIA PO) Take 30 mLs by mouth daily      Lactobacillus (ACIDOPHILUS) CAPS capsule Take 1 capsule by mouth daily      calcitonin (MIACALCIN) 200 UNIT/ACT nasal spray 1 spray by Nasal route daily      lactobacillus (CULTURELLE) capsule Take 1 capsule by mouth daily      aspirin 325 MG tablet Take 325 mg by mouth daily      Wheat Dextrin (BENEFIBER) POWD Take 4 g by mouth 3 times daily (with meals)      lithium 300 MG capsule Take 300 mg by mouth nightly       cloZAPine (CLOZARIL) 50 MG tablet Take 100 mg by mouth daily       pravastatin (PRAVACHOL) 40 MG tablet Take 80 mg by mouth nightly       melatonin 3 MG TABS tablet Take 5 mg by mouth daily       oxybutynin (DITROPAN XL) 15 MG CR tablet Take 30 mg by mouth daily       divalproex (DEPAKOTE) 500 MG ER tablet Take 1 tablet by mouth nightly.  30 tablet     calcium-vitamin D (OSCAL-500) 500-200 MG-UNIT per tablet Take 1 tablet by mouth 3 times daily. 30 tablet     acetaminophen (TYLENOL) 325 MG tablet Take 650 mg by mouth every 4 hours as needed for Pain or Fever.  polyethylene glycol (GLYCOLAX) powder Take 17 g by mouth daily       chlorhexidine (PERIDEX) 0.12 % solution Take 15 mLs by mouth 2 times daily.  fludrocortisone (FLORINEF) 0.1 MG tablet Take 0.1 mg by mouth daily.  levothyroxine (SYNTHROID) 125 MCG tablet Take 137 mcg by mouth Daily       therapeutic multivitamin-minerals (THERAGRAN-M) tablet Take 1 tablet by mouth daily. No current facility-administered medications for this visit. Family History   Problem Relation Age of Onset    Cancer Mother         Review of Systems:   General/Constitutional: No recent loss of weight or appetite changes. No fever or chills. HENT: Negative. Eyes: Negative. Upper respiratory tract: No nasal stuffiness or post nasal drip. Lower respiratory tract/ lungs: No cough or sputum production. No hemoptysis. Cardiovascular: No palpitations or chest pain. Gastrointestinal: No nausea or vomiting. Neurological: No focal neurologiacal weakness. Extremities: No edema. Musculoskeletal: No complaints. Genitourinary: No complaints. Hematological: Negative. Psychiatric/Behavioral: Negative. Skin: No itching. /84 (Site: Right Upper Arm, Position: Sitting, Cuff Size: Large Adult)   Pulse 83   Temp 97 °F (36.1 °C) (Temporal)   Ht 5' 7\" (1.702 m)   Wt 188 lb 3.2 oz (85.4 kg)   SpO2 94%   BMI 29.48 kg/m²   Mallampati airway Class:  4  Neck Circumference:  17 Inches  [place] sleepiness score 10/6/21: 5  Sleep apnea Quality of Life Questionnaire Score:81      Physical Exam   Nursing note and vitals reviewed. Constitutional: Patient appears moderately built and moderately nourished. No distress. Patient is oriented to person, place, and time. HENT:   Head: Normocephalic and atraumatic.    Right Ear: External ear normal.   Left Ear: External ear normal. Mouth/Throat: Oropharynx is clear and moist.  No oral thrush. Eyes: Conjunctivae are normal. Pupils are equal, round, and reactive to light. No scleral icterus. Neck: Neck supple. No JVD present. No tracheal deviation present. Cardiovascular: Normal rate, regular rhythm, normal heart sounds. No murmur heard. Pulmonary/Chest: Effort normal and breath sounds normal. No stridor. No respiratory distress. No wheezes. No rales. Patient exhibits no tenderness. Abdominal: Soft. Patient exhibits no distension. No tenderness. Musculoskeletal: Normal range of motion. Extremities: Patient exhibits no edema and no tenderness. Lymphadenopathy:  No cervical adenopathy. Neurological: Patient is alert and oriented to person, place, and time. She had a mental disability  Skin: Skin is warm and dry. Patient is not diaphoretic. Psychiatric: She had a mental disability    Diagnostic Data:  None related sleep. Assessment:  -Hypersomnia ( Excessive daytime sleepiness) may be due to obstructive sleep apnea Vs Inadequate sleep hygiene.  -She was diagnosed with nocturnal hypoxia. She was prescribed with a 2 L of oxygen on nasal cannula at nighttime. However patient currently not using her oxygen with good compliance. .  -Inadequate sleep hygiene. -Bipolar disorder  -Chronic kidney disease. -Depression  -Essential hypertension on treatment medications under control  -History of seizures. Recommendations/Plan:  -Will schedule patient for polysomnogram in the sleep lab with seizure montage. .   -We will see Amei Ramirez back in 1week after the sleep study to go over the sleep study results and further management options.  -She was educated to practice good sleep hygiene practices. She was provided with a good sleep hygiene hand out.  -[facility] was advised to make earlier appointment with my clinic if she develops any worsening of sleep symptoms.  She verbalizes understanding.  -She was advised to loose weight by controlling diet and doing exercise once cleared by her family physician.   - Nuno Razo and her caregiver Ms. Montiel were educated about my impression and plan. They verbalizes understanding.      -I personally reviewed updated the Past medical hx, Past surgical hx,Social hx, Family hx, Medications, Allergies in the discrete data section of the patient chart along with labs, Pulmonary medicine,Sleep medicine related, Pathological, Microbiological and Radiological investigations.

## 2021-10-06 ENCOUNTER — INITIAL CONSULT (OUTPATIENT)
Dept: PULMONOLOGY | Age: 61
End: 2021-10-06
Payer: MEDICARE

## 2021-10-06 VITALS
SYSTOLIC BLOOD PRESSURE: 124 MMHG | TEMPERATURE: 97 F | OXYGEN SATURATION: 94 % | HEART RATE: 83 BPM | BODY MASS INDEX: 29.54 KG/M2 | WEIGHT: 188.2 LBS | HEIGHT: 67 IN | DIASTOLIC BLOOD PRESSURE: 84 MMHG

## 2021-10-06 DIAGNOSIS — G47.30 SLEEP APNEA, UNSPECIFIED TYPE: Primary | ICD-10-CM

## 2021-10-06 DIAGNOSIS — F32.A DEPRESSION, UNSPECIFIED DEPRESSION TYPE: ICD-10-CM

## 2021-10-06 DIAGNOSIS — F31.70 BIPOLAR AFFECTIVE DISORDER IN REMISSION (HCC): ICD-10-CM

## 2021-10-06 DIAGNOSIS — G40.909 SEIZURE DISORDER (HCC): ICD-10-CM

## 2021-10-06 PROCEDURE — G8484 FLU IMMUNIZE NO ADMIN: HCPCS | Performed by: INTERNAL MEDICINE

## 2021-10-06 PROCEDURE — 1036F TOBACCO NON-USER: CPT | Performed by: INTERNAL MEDICINE

## 2021-10-06 PROCEDURE — 3017F COLORECTAL CA SCREEN DOC REV: CPT | Performed by: INTERNAL MEDICINE

## 2021-10-06 PROCEDURE — G8417 CALC BMI ABV UP PARAM F/U: HCPCS | Performed by: INTERNAL MEDICINE

## 2021-10-06 PROCEDURE — G8427 DOCREV CUR MEDS BY ELIG CLIN: HCPCS | Performed by: INTERNAL MEDICINE

## 2021-10-06 PROCEDURE — 99203 OFFICE O/P NEW LOW 30 MIN: CPT | Performed by: INTERNAL MEDICINE

## 2021-10-06 NOTE — PROGRESS NOTES
Chief Complaint:  Alana Obando is here today for a sleep consult, no prior studies and or no machine, per patient     Mallampati airway Class:  4  Neck Circumference:  17 Inches    [place] sleepiness score 10/6/21:  ***.

## 2021-10-06 NOTE — PATIENT INSTRUCTIONS
Recommendations/Plan:  -Will schedule patient for polysomnogram in the sleep lab with seizure montage. .   -We will see Randi Hernandez back in 1week after the sleep study to go over the sleep study results and further management options.  -She was educated to practice good sleep hygiene practices. She was provided with a good sleep hygiene hand out.  -Diann Pabon was advised to make earlier appointment with my clinic if she develops any worsening of sleep symptoms. She verbalizes understanding.  -She was advised to loose weight by controlling diet and doing exercise once cleared by her family physician.   - Randi Hernandez and her caregiver Ms. Montiel were educated about my impression and plan. They verbalizes understanding.

## 2021-10-07 PROBLEM — E66.3 OVERWEIGHT (BMI 25.0-29.9): Status: ACTIVE | Noted: 2021-10-07

## 2021-10-14 ENCOUNTER — HOSPITAL ENCOUNTER (OUTPATIENT)
Dept: CT IMAGING | Age: 61
Discharge: HOME | End: 2021-10-14
Payer: MEDICARE

## 2021-10-14 ENCOUNTER — HOSPITAL ENCOUNTER (OUTPATIENT)
Dept: INTERVENTIONAL RADIOLOGY/VASCULAR | Age: 61
Discharge: HOME | End: 2021-10-14
Payer: MEDICARE

## 2021-10-14 DIAGNOSIS — R26.9 ABNORMAL GAIT: ICD-10-CM

## 2021-10-14 DIAGNOSIS — R42 DIZZINESS AND GIDDINESS: ICD-10-CM

## 2021-10-14 PROBLEM — Z12.11 SCREENING FOR COLORECTAL CANCER: Status: ACTIVE | Noted: 2021-10-14

## 2021-10-14 PROBLEM — Z12.12 SCREENING FOR COLORECTAL CANCER: Status: ACTIVE | Noted: 2021-10-14

## 2021-10-14 PROCEDURE — 93880 EXTRACRANIAL BILAT STUDY: CPT

## 2021-10-14 PROCEDURE — 70470 CT HEAD/BRAIN W/O & W/DYE: CPT

## 2021-10-14 PROCEDURE — 6360000004 HC RX CONTRAST MEDICATION: Performed by: NURSE PRACTITIONER

## 2021-10-14 RX ADMIN — IOPAMIDOL 80 ML: 755 INJECTION, SOLUTION INTRAVENOUS at 18:25

## 2021-10-25 ENCOUNTER — OFFICE VISIT (OUTPATIENT)
Dept: CARDIOLOGY CLINIC | Age: 61
End: 2021-10-25
Payer: MEDICARE

## 2021-10-25 VITALS
WEIGHT: 185.8 LBS | BODY MASS INDEX: 29.86 KG/M2 | HEART RATE: 78 BPM | SYSTOLIC BLOOD PRESSURE: 136 MMHG | HEIGHT: 66 IN | DIASTOLIC BLOOD PRESSURE: 72 MMHG

## 2021-10-25 DIAGNOSIS — R42 DIZZINESS: Primary | ICD-10-CM

## 2021-10-25 DIAGNOSIS — I35.0 AORTIC VALVE STENOSIS, ETIOLOGY OF CARDIAC VALVE DISEASE UNSPECIFIED: ICD-10-CM

## 2021-10-25 PROCEDURE — G8484 FLU IMMUNIZE NO ADMIN: HCPCS | Performed by: INTERNAL MEDICINE

## 2021-10-25 PROCEDURE — G8427 DOCREV CUR MEDS BY ELIG CLIN: HCPCS | Performed by: INTERNAL MEDICINE

## 2021-10-25 PROCEDURE — 93000 ELECTROCARDIOGRAM COMPLETE: CPT | Performed by: INTERNAL MEDICINE

## 2021-10-25 PROCEDURE — 1036F TOBACCO NON-USER: CPT | Performed by: INTERNAL MEDICINE

## 2021-10-25 PROCEDURE — 3017F COLORECTAL CA SCREEN DOC REV: CPT | Performed by: INTERNAL MEDICINE

## 2021-10-25 PROCEDURE — G8417 CALC BMI ABV UP PARAM F/U: HCPCS | Performed by: INTERNAL MEDICINE

## 2021-10-25 PROCEDURE — 99213 OFFICE O/P EST LOW 20 MIN: CPT | Performed by: INTERNAL MEDICINE

## 2021-10-25 NOTE — PROGRESS NOTES
EGD BIOPSY performed by Priyanka Piña MD at [address] [facility]  02/08/2018    DR. RAMIREZ-[facility]       Subjective:     REVIEW OF SYSTEMS  Constitutional: denies sweats, chills and fever  HENT: denies  congestion, sinus pressure, sneezing and sore throat. Eyes: denies  pain, discharge, redness and itching. Respiratory: denies apnea, cough  Gastrointestinal: denies blood in stool, constipation, diarrhea   Endocrine: denies cold intolerance, heat intolerance, polydipsia. Genitourinary: denies dysuria, enuresis, flank pain and hematuria. Musculoskeletal: denies arthralgias, joint swelling and neck pain. Neurological: denies numbness and headaches. Psychiatric/Behavioral: denies agitation, confusion, decreased concentration and dysphoric mood    All others reviewed and are negative. Objective:     /72   Pulse 78   Ht 5' 6\" (1.676 m)   Wt 185 lb 12.8 oz (84.3 kg)   BMI 29.99 kg/m²     Wt Readings from Last 3 Encounters:   10/25/21 185 lb 12.8 oz (84.3 kg)   10/14/21 185 lb (83.9 kg)   10/06/21 188 lb 3.2 oz (85.4 kg)     BP Readings from Last 3 Encounters:   10/25/21 136/72   10/14/21 (!) 133/39   10/06/21 124/84       PHYSICAL EXAM  Constitutional: Oriented to person, place, and time. Appears well-developed and well-nourished. HENT:   Head: Normocephalic and atraumatic. Eyes: EOM are normal. Pupils are equal, round, and reactive to light. Neck: Normal range of motion. Neck supple. No JVD present. Cardiovascular: Normal rate , SM+ and intact distal pulses. Pulmonary/Chest: Effort normal and breath sounds normal. No respiratory distress. No wheezes. No rales. Abdominal: Soft. Bowel sounds are normal. No distension. There is no tenderness. Musculoskeletal: Normal range of motion. No edema. Neurological: Alert and oriented to person, place, and time. No cranial nerve deficit. Coordination normal.   Skin: Skin is warm and dry.    Psychiatric: Normal mood and affect.        Lab Results   Component Value Date    CKTOTAL 111 03/26/2017       Lab Results   Component Value Date    WBC 27 03/19/2021    WBC 6.6 02/22/2021    RBC 1 03/19/2021    HGB 12.3 03/17/2021    HCT 38.5 03/17/2021    MCV 86 03/17/2021    MCH 27.5 03/17/2021    MCHC 31.8 03/17/2021    RDW 18.9 03/17/2021     03/17/2021     02/22/2021    MPV 8.2 03/17/2021       Lab Results   Component Value Date     03/17/2021    K 4.5 03/17/2021    K 4.5 02/09/2018     03/17/2021    CO2 30 03/17/2021    BUN 11 03/17/2021    LABALBU 3.6 03/17/2021    CREATININE 0.76 03/17/2021    CALCIUM 9.8 03/17/2021    LABGLOM >90 08/07/2020    GLUCOSE Negative 03/19/2021    GLUCOSE 132 03/17/2021       Lab Results   Component Value Date    ALKPHOS 60 03/17/2021    ALKPHOS 37 01/24/2021    ALT 16 03/17/2021    AST 22 03/17/2021    PROT 7.4 03/17/2021    BILITOT Negative 03/19/2021    BILIDIR <0.1 06/13/2019    LABALBU 3.6 03/17/2021       Lab Results   Component Value Date    MG 2.1 02/06/2018       Lab Results   Component Value Date    INR 1.10 02/22/2021    INR 1.18 01/24/2021    INR 1.14 07/21/2020    PROTIME 12.6 02/22/2021    PROTIME 13.6 (H) 01/24/2021    PROTIME 13.1 07/21/2020         Lab Results   Component Value Date    LABA1C 5.7 02/17/2020       Lab Results   Component Value Date    TRIG 205 03/17/2021    HDL 29 03/17/2021    LDLCALC 62 03/17/2021    LABVLDL 41 03/17/2021       Lab Results   Component Value Date    TSH 0.48 10/15/2021         Testing Reviewed:      I haveindividually reviewed the below cardiac tests    EKG:    ECHO:   Results for orders placed during the hospital encounter of 07/13/20   Echo 2D w doppler w color complete    Narrative Transthoracic Echocardiography Report (TTE)     Demographics      Patient Name   [facility]          Gender              Female                  Jamar LOUIE      MR #           [id number]        Race                 Ethnicity      Account #      [de-identified]        Room Number      Accession      [id number]        Date of Study       07/13/2020   Number      Date of Birth  1960       Referring Physician Man Adkins, [address] [place] [address]      Age            63 year(s)       Ernestina Curiel                                                       [facility]                                      Interpreting        Jacinto Pratt MD                                   Physician     Procedure    Type of Study      TTE procedure:ECHOCARDIOGRAM COMPLETE 2D W DOPPLER W COLOR. Procedure Date  Date: 07/13/2020 Start: 10:08 AM    Study Location: Bedside  Technical Quality: Limited visualization due to poor acoustical window. Indications:Pericardial effusion. Additional Medical History:Hypothyroidism, hypoxia, congestive heart failure    Patient Status: Routine    Height: 66 inches Weight: 192 pounds BSA: 1.97 m^2 BMI: 30.99 kg/m^2    BP: 131/59 mmHg     Conclusions      Summary   Ejection fraction is visually estimated at 55%. Overall left ventricular function is normal.   Normal right ventricular size and function. The aortic valve leaflets were not well visualized. Aortic valve appears tricuspid. Aortic valve leaflets are somewhat thickened. Aortic valve leaflets are mildly calcified. Moderate aortic stenosis with mean gradient 24 mm Hg and peak velocity 3.2   m/s   Mild tricuspid regurgitation. Moderate localized pericardial effusion posteriorly (measuring 1.88 cm)   with no obvious tamponade physiology. Signature      ----------------------------------------------------------------   Electronically signed by Jacinto Pratt MD (Interpreting   physician) on 07/14/2020 at 09:47 AM   ----------------------------------------------------------------      Findings      Mitral Valve   Structurally normal mitral valve.       Aortic Valve   The aortic valve leaflets were not well visualized. Aortic valve appears tricuspid. Aortic valve leaflets are somewhat thickened. Aortic valve leaflets are mildly calcified. Moderate aortic stenosis with mean gradient 24 mm Hg and peak velocity 3.2   m/s   Mild aortic regurgitation is noted. Tricuspid Valve   Tricuspid valve was not well visualized. Tricuspid valve is structurally normal.   Mild tricuspid regurgitation. Pulmonic Valve   The pulmonic valve was not well visualized . Pulmonic valve is structurally normal.      Left Atrium   Normal size left atrium. Left Ventricle   Ejection fraction is visually estimated at 55%. Overall left ventricular function is normal.      Right Atrium   The right atrium is of normal size. Right Ventricle   Normal right ventricular size and function. Pericardial Effusion   Moderate localized pericardial effusion posteriorly (measuring 1.88 cm)   with no obvious tamponade physiology.      M-Mode/2D Measurements & Calculations      LV Diastolic    LV Systolic Dimension: 2.7   LA Dimension: 3.3 cmAO Root   Dimension: 4.4  cm                           Dimension: 2.5 cmLA Area:   cm              LV Volume Diastolic: 98.7 ml 93.9 cm^2   LV FS:38.6 %    LV Volume Systolic: 27 ml   LV PW           LV EDV/LV EDV Index: 48.9   Diastolic: 1 cm VR/51 Q^4LT ESV/LV ESV   Septum          Index: 27 ml/14 m^2          RV Diastolic Dimension: 2.6   Diastolic: 1 cm EF Calculated: 69.2 %        cm                                                   LA/Aorta: 1.32                                                Ascending Aorta: 2.9 cm                   LVOT: 1.8 cm                 LA volume/Index: 28.5 ml                                                /14m^2     Doppler Measurements & Calculations      MV Peak E-Wave:     AV Peak Velocity: 321    LVOT Peak Velocity: 116 cm/s   88.7 cm/s           cm/s                     LVOT Mean Velocity: 77.7 cm/s   MV Peak A-Wave:     AV Peak Gradient: 41.22  LVOT Peak Gradient: 5   83.6 cm/s           mmHg                     mmHgLVOT Mean Gradient: 3   MV E/A Ratio: 1.06  AV Mean Velocity: 229    mmHg   MV Peak Gradient:   cm/s   3.15 mmHg           AV Mean Gradient: 21     TV Peak E-Wave: 32.1 cm/s                       mmHg                     TV Peak A-Wave: 45.8 cm/s   MV Deceleration     AV VTI: 72.9 cm   Time: 278 msec      AV Area                  TV Peak Gradient: 0.41 mmHg   MV P1/2t: 81 msec   (Continuity):0.82 cm^2   TR Velocity:271 cm/s   MVA by PHT:2.72                              TR Gradient:29.38 mmHg   cm^2                LVOT VTI: 23.5 cm        PV Peak Velocity: 87.8 cm/s                       AV P1/2t: 703 msec       PV Peak Gradient: 3.08 mmHg   MV E' Septal        IVRT: 92 msec   Velocity: 5.2 cm/s   MV A' Septal   Velocity: 6.7 cm/s  AV DVI (VTI): 0.32AV DVI   MV E' Lateral       (Vmax):0.36   Velocity: 4.8 cm/s   MV A' Lateral   Velocity: 9.1 cm/s   E/E' septal: 17.06   E/E' lateral: 18.48     [email or web address] [id number]%3d       STRESS:    CATH:    Assessment/Plan       Diagnosis Orders   1. Dizziness  EKG 12 lead   2. Aortic valve stenosis, etiology of cardiac valve disease unspecified       Moderate Aortic stenosis  Moderate pericardial effusion  Multiple pneumonia  COPD  Developmental delay    Patient is present with her   Apparently walks towards one side  Denies any symptoms  Agreed by the coordinator  Had moderate effusion in 2019 also  No changes  Does have some mod AS  Needs routine survillence  Continue rest of th emanagement  The patient is asked to make an attempt to improve diet and exercise patterns to aid in medical management of this problem.   Advised more plant based nutrition/meditarrean diet   Advised patient to call office or seek immediate medical attention if there is any new onset of  any chest pain, sob, palpitations, lightheadedness, dizziness, orthopnea, PND or pedal edema. All medication side effects were discussed in details. Thank youfor allowing me to participate in the care of this patient. Please do not hesitate to contact me for any further questions. No follow-ups on file.        Electronically signed by Guillermina Cordova MD [facility]  10/25/2021 at 9:29 AM EDT

## 2021-10-25 NOTE — PROGRESS NOTES
1 year follow-up. She has episodes when she changes where she seems \"out of it. \"  They are also going to see a neurologist.     EKG completed. She is having a sleep study done as well as an outpatient.

## 2021-11-13 PROBLEM — Z12.11 SCREENING FOR COLORECTAL CANCER: Status: RESOLVED | Noted: 2021-10-14 | Resolved: 2021-11-13

## 2021-11-13 PROBLEM — Z12.12 SCREENING FOR COLORECTAL CANCER: Status: RESOLVED | Noted: 2021-10-14 | Resolved: 2021-11-13

## 2021-11-16 NOTE — PROGRESS NOTES
DR. Middleton Short       Social History     Tobacco Use    Smoking status: Never Smoker    Smokeless tobacco: Never Used   Vaping Use    Vaping Use: Never used   Substance Use Topics    Alcohol use: No    Drug use: No       Allergies   Allergen Reactions    Nsaids      Interacts with psych meds       Current Outpatient Medications   Medication Sig Dispense Refill    polyethylene glycol (GLYCOLAX) 17 GM/SCOOP powder Take 17 g by mouth 2 times daily 1530 g 5    docusate sodium (COLACE) 100 MG capsule Take 2 capsules by mouth 2 times daily 120 capsule 5    linaclotide (LINZESS) 290 MCG CAPS capsule Take 1 capsule by mouth every morning (before breakfast) 30 capsule 3    pantoprazole (PROTONIX) 40 MG tablet Take 1 tablet by mouth every morning (before breakfast) 30 tablet 5    barium sulfate ([employer] RADIOPAQUE MARKERS) CAPS Take 1 capsule by mouth ONCE PRN for Other 1 capsule 0    benzonatate (TESSALON) 100 MG capsule Take 100 mg by mouth 3 times daily as needed for Cough      Magnesium Hydroxide (MILK OF MAGNESIA PO) Take 30 mLs by mouth daily      Lactobacillus (ACIDOPHILUS) CAPS capsule Take 1 capsule by mouth daily      calcitonin (MIACALCIN) 200 UNIT/ACT nasal spray 1 spray by Nasal route daily      lactobacillus (CULTURELLE) capsule Take 1 capsule by mouth daily      aspirin 325 MG tablet Take 325 mg by mouth daily      Wheat Dextrin (BENEFIBER) POWD Take 4 g by mouth 3 times daily (with meals)      lithium 300 MG capsule Take 300 mg by mouth nightly       cloZAPine (CLOZARIL) 50 MG tablet Take 100 mg by mouth daily       pravastatin (PRAVACHOL) 40 MG tablet Take 80 mg by mouth nightly       melatonin 3 MG TABS tablet Take 5 mg by mouth daily       oxybutynin (DITROPAN XL) 15 MG CR tablet Take 30 mg by mouth daily       divalproex (DEPAKOTE) 500 MG ER tablet Take 1 tablet by mouth nightly.  30 tablet     calcium-vitamin D (OSCAL-500) 500-200 MG-UNIT per tablet Take 1 tablet by mouth 3 times daily. 30 tablet     acetaminophen (TYLENOL) 325 MG tablet Take 650 mg by mouth every 4 hours as needed for Pain or Fever.  chlorhexidine (PERIDEX) 0.12 % solution Take 15 mLs by mouth 2 times daily.  fludrocortisone (FLORINEF) 0.1 MG tablet Take 0.1 mg by mouth daily.  levothyroxine (SYNTHROID) 125 MCG tablet Take 137 mcg by mouth Daily       therapeutic multivitamin-minerals (THERAGRAN-M) tablet Take 1 tablet by mouth daily. No current facility-administered medications for this visit. Family History   Problem Relation Age of Onset    Cancer Mother           /67 (Site: Left Upper Arm, Position: Sitting, Cuff Size: Medium Adult)   Pulse 103   Temp 97.8 °F (36.6 °C)   Ht 5' 6\" (1.676 m)   Wt 187 lb (84.8 kg)   SpO2 99% Comment: room air at rest  BMI 30.18 kg/m²     BMI:  Body mass index is 30.18 kg/m². Mallampati airway Class:4  Neck Circumference:.17 Inches  [place] sleepiness score 12/15/21:  Not able to complete due to mental disability  Sleep apnea quality of life questionnaire:. Not able to complete due to mental disability      Physical Exam :  Constitutional: Patient appears moderately built and moderately nourished. No distress. Patient is oriented to person, place, and time. HENT:   Head: Normocephalic and atraumatic. Right Ear: External ear normal.   Left Ear: External ear normal.   Mouth/Throat: Oropharynx is clear and moist.   Eyes: Conjunctivae are normal. Pupils are equal and reactive to light. No scleral icterus. Neck: Neck supple. No JVD present. Cardiovascular: Normal rate, regular rhythm, normal heart sounds. No murmur heard. Pulmonary/Chest: Effort normal and breath sounds normal. No stridor. No respiratory distress. No wheezes. No rales. Abdominal: Soft. Patient exhibits no distension. No tenderness. Musculoskeletal: Normal range of motion. Extremities: Patient exhibits no erythema or no edema.    Lymphadenopathy:  No cervical adenopathy. Neurological: Patient is alert and oriented to person, place, and time. Skin: Skin is warm and dry. Patient is not diaphoretic. Psychiatric: Patient  has a normal mood and affect. Diagnostic Data:    Sleep study done on :   SLEEP STUDY REPORT     PATIENT NAME: Chon Merritt                :        1960  MED REC NO:   [id number]                           ROOM:  ACCOUNT NO:   [de-identified]                           ADMIT DATE: 2021  PROVIDER:     David Espinal. MD Jhonatan     DATE OF STUDY:  2021     REFERRING PROVIDER:  Ernesto Lee CNP  IMPRESSION:  1. During the entire sleep study, the slept on left lateral position  limiting the interpretation of the sleep study. 2.  Decreased and delayed REM sleep limiting the interpretation of the  sleep study. 4.  Mild obstructive sleep apnea on her left lateral position. In fact,  the patient slept during the entire sleep study on her left lateral  position. 5.  Bipolar disorder. 6.  Depression. 7.  Essential hypertension. 8.  Chronic kidney disease. Assesment:  -Mild obstructive sleep apnea on her left lateral position. In fact, the patient slept during the entire sleep study on her left lateral Position.  -Bipolar disorder.  -Depression.  -Chronic kidney disease. Recommendations/Plan:  -I had a discussion with patient and her care giver regarding avialable treatment options for her sleep disorder breathing including but not limited to CPAP titration in the sleep lab Vs.Dental appliance placement with referral to a local dentist Vs other available surgical options including Uvulopalatopharyngoplasty, maxillomandibular ostomy and tracheostomy as last option.  At the end of discussion, she decided to go for the CPAP titration.  -Schedule patient for CPAP titration at [facility] sleep lab as soon as possible.  -She was advised to keep good compliance with recommended positive airway pressure therapy to get optimal results and clinical improvement.  -She was advised to call [employer] regarding supplies if needed.  -She was advised to call my office for earlier appointment if needed for worsening of sleep symptoms.  -Patient to follow with my clinic at [address] [place] [address] sleep clinic in 6 to 8 weeks with CPAP download for further evaluation.  -She was advised to continue to practice good sleep hygiene practices.  -She was advised to loose weight by controlling diet and doing exercise.  -Mary Lindquist and her caregiver Ms. Maren Haynes were educated about my impression and plan. They verbalizes understanding.      -I personally reviewed updated the Past medical hx, Past surgical hx,Social hx, Family hx, Medications, Allergies in the discrete data section of the patient chart along with labs, Pulmonary medicine,Sleep medicine related, Pathological, Microbiological and Radiological investigations.

## 2021-12-07 ENCOUNTER — HOSPITAL ENCOUNTER (OUTPATIENT)
Dept: SLEEP CENTER | Age: 61
Discharge: HOME | End: 2021-12-09
Payer: MEDICARE

## 2021-12-07 DIAGNOSIS — G47.30 SLEEP APNEA, UNSPECIFIED TYPE: ICD-10-CM

## 2021-12-07 DIAGNOSIS — G40.909 SEIZURE DISORDER (HCC): ICD-10-CM

## 2021-12-07 DIAGNOSIS — F31.70 BIPOLAR AFFECTIVE DISORDER IN REMISSION (HCC): ICD-10-CM

## 2021-12-07 DIAGNOSIS — F32.A DEPRESSION, UNSPECIFIED DEPRESSION TYPE: ICD-10-CM

## 2021-12-07 PROCEDURE — 95810 POLYSOM 6/> YRS 4/> PARAM: CPT

## 2021-12-08 LAB — STATUS: NORMAL

## 2021-12-09 NOTE — PROGRESS NOTES
[address] [place] [address]                               SLEEP STUDY REPORT    PATIENT NAME: Ladarius Patton                :        1960  MED REC NO:   [id number]                           ROOM:  ACCOUNT NO:   [de-identified]                           ADMIT DATE: 2021  PROVIDER:     Gerardo Rivas. MD Jhonatan    DATE OF STUDY:  2021    REFERRING PROVIDER:  Maria Isabel Ramírez CNP    The patient's height is 67 inches, weight is 188 pounds with a BMI of  29.4. HISTORY:  The patient is a 59-year-old female who was initially  evaluated by me on 10/06/2021. The patient is currently suffering with  nocturnal hypoxia and she is on 2 liters of oxygen on nasal cannula. She had associated comorbidities including bipolar disorder, depression,  and hypertension. The patient was scheduled for sleep study to evaluate  for sleep apnea as an etiology for nocturnal hypoxia. METHODS:  The patient underwent digital polysomnography in compliance  with the standards and specifications from the AASM Manual including the  simultaneous recording of 3 EEG channels (F4-M1, C4-M1, and O2-M1 with  back up electrodes F3-M2, C3-M2, and O1-M2), 2 EOG channels (E1-M2, and  E2-M1,), EMG (chin, left & right leg), EKG, Nonin pulse oximetry with   less than 2 second averaging time, body position, airflow recorded by  oral-nasal thermal sensor and nasal air pressure transducer, plus  respiratory effort recorded by calibrated respiratory inductance  plethysmography (RIP), flow volume loop, sound and video. Sleep staging  and scoring followed the standard put forth by the American Academy of  Sleep Medicine and utilized the 1B obstructive hypopnea event  desaturation of 4 percent or greater. INTERPRETATION:  This is a baseline sleep study and the study was  performed on 2021.   The study was started at 09:15 p.m. and was  terminated at 05:15 a.m. with total recording time of 480.2 minutes,  sleep period time was 354.6 minutes, and total sleep time was 327.6  minutes. Overall sleep efficiency was 68.2%. The sleep onset latency  was 125.5 minutes, wake after sleep onset was 27 minutes. REM sleep  latency was 244.5 minutes. SLEEP STAGING AND DISTRIBUTION SUMMARY:  Revealed the patient spent 7.5  minutes in stage I consisting of 2.3% of total sleep time, 212.5 minutes  in stage II consisting of 64.9%, 97 minutes in stage III consisting of  29.6% and 10.6 minutes in REM sleep consisting of 3.2% of total sleep  time. RESPIRATORY EVENT ANALYSIS:  Revealed the patient had 6 apneas, all of  them were obstructive in nature. The patient also had a total of 72  hypopneas, all of them were obstructive in nature. The total number of  apneas and hypopneas recorded during the study was 78 with an  apnea-hypopnea index of 14.3. The patient's REM sleep apnea-hypopnea  index was 0. POSITION ANALYSIS:  Revealed the patient spent 0 minutes in supine  position. The patient spent 327.1 minutes in left lateral position. In  fact this is the position she slept during the entire sleep study with  left lateral position apnea-hypopnea index of 14.7. PERIODIC LIMB MOVEMENT ANALYSIS:  Revealed the patient did not have any  periodic limb movements. The patient had a total of 26 spontaneous  arousals with a spontaneous arousal index of 4.8. OXYGEN SATURATION MONITORING:  Revealed the patient had a maximum oxygen  desaturation to 86% with a mean oxygen saturation of 92.2%. The patient  spent a total of 1.2 minutes below oxygen saturation less than 88%. EKG MONITORING:  Revealed normal sinus rhythm. IMPRESSION:  1. During the entire sleep study, the slept on left lateral position  limiting the interpretation of the sleep study. 2.  Decreased and delayed REM sleep limiting the interpretation of the  sleep study.   4.  Mild obstructive sleep apnea on her left lateral position. In fact,  the patient slept during the entire sleep study on her left lateral  position. 5.  Bipolar disorder. 6.  Depression. 7.  Essential hypertension. 8.  Chronic kidney disease. RECOMMENDATIONS:  The patient should be scheduled for followup with my  clinic as soon as possible to discuss about the sleep study findings for  further management. Thanks to Annalise Curry CNP, for giving me this opportunity to  participate in the care of this pleasant lady.         Miguel Taylor MD    D: 12/08/2021 18:21:47       T: 12/09/2021 3:04:18     [email or web address]  Job#: [id number]     Doc#: [id number]    CC:

## 2021-12-15 ENCOUNTER — OFFICE VISIT (OUTPATIENT)
Dept: PULMONOLOGY | Age: 61
End: 2021-12-15
Payer: MEDICARE

## 2021-12-15 VITALS
HEART RATE: 103 BPM | SYSTOLIC BLOOD PRESSURE: 124 MMHG | TEMPERATURE: 97.8 F | WEIGHT: 187 LBS | OXYGEN SATURATION: 99 % | HEIGHT: 66 IN | BODY MASS INDEX: 30.05 KG/M2 | DIASTOLIC BLOOD PRESSURE: 68 MMHG

## 2021-12-15 DIAGNOSIS — G47.30 SLEEP APNEA, UNSPECIFIED TYPE: Primary | ICD-10-CM

## 2021-12-15 DIAGNOSIS — F32.A DEPRESSION, UNSPECIFIED DEPRESSION TYPE: ICD-10-CM

## 2021-12-15 DIAGNOSIS — G47.33 OSA (OBSTRUCTIVE SLEEP APNEA): ICD-10-CM

## 2021-12-15 DIAGNOSIS — F31.70 BIPOLAR AFFECTIVE DISORDER IN REMISSION (HCC): ICD-10-CM

## 2021-12-15 PROCEDURE — 99213 OFFICE O/P EST LOW 20 MIN: CPT | Performed by: INTERNAL MEDICINE

## 2021-12-15 PROCEDURE — G8427 DOCREV CUR MEDS BY ELIG CLIN: HCPCS | Performed by: INTERNAL MEDICINE

## 2021-12-15 PROCEDURE — G8484 FLU IMMUNIZE NO ADMIN: HCPCS | Performed by: INTERNAL MEDICINE

## 2021-12-15 PROCEDURE — G8417 CALC BMI ABV UP PARAM F/U: HCPCS | Performed by: INTERNAL MEDICINE

## 2021-12-15 PROCEDURE — 1036F TOBACCO NON-USER: CPT | Performed by: INTERNAL MEDICINE

## 2021-12-15 PROCEDURE — 3017F COLORECTAL CA SCREEN DOC REV: CPT | Performed by: INTERNAL MEDICINE

## 2021-12-15 NOTE — PROGRESS NOTES
Chief Complaint: Jasen Gonzales is here for Psg results    Mallampati airway Class:4  Neck Circumference:.17 Inches    [place] sleepiness score 12/15/21:   Sleep apnea quality of life questionnaire:.

## 2021-12-15 NOTE — PATIENT INSTRUCTIONS
Recommendations/Plan:  -I had a discussion with patient regarding avialable treatment options for her sleep disorder breathing including but not limited to CPAP titration in the sleep lab Vs.Dental appliance placement with referral to a local dentist Vs other available surgical options including Uvulopalatopharyngoplasty, maxillomandibular ostomy and tracheostomy as last option. At the end of discussion, she decided to go for the CPAP titration.  -Schedule patient for CPAP titration at [facility] sleep lab as soon as possible.  -She was advised to keep good compliance with recommended positive airway pressure therapy to get optimal results and clinical improvement.  -She was advised to call [employer] regarding supplies if needed.  -She was advised to call my office for earlier appointment if needed for worsening of sleep symptoms.  -Patient to follow with my clinic at [facility] sleep clinic in 6 to 8 weeks with CPAP download for further evaluation.  -Bam Garcia and her caregiver Ms. Jessie Pizano were educated about my impression and plan. They verbalizes understanding.

## 2022-02-18 ENCOUNTER — HOSPITAL ENCOUNTER (OUTPATIENT)
Dept: SLEEP CENTER | Age: 62
Discharge: HOME | End: 2022-02-20
Payer: MEDICARE

## 2022-02-18 DIAGNOSIS — F32.A DEPRESSION, UNSPECIFIED DEPRESSION TYPE: ICD-10-CM

## 2022-02-18 DIAGNOSIS — G47.33 OSA (OBSTRUCTIVE SLEEP APNEA): ICD-10-CM

## 2022-02-18 DIAGNOSIS — G47.30 SLEEP APNEA, UNSPECIFIED TYPE: ICD-10-CM

## 2022-02-18 DIAGNOSIS — F31.70 BIPOLAR AFFECTIVE DISORDER IN REMISSION (HCC): ICD-10-CM

## 2022-02-18 PROCEDURE — 95811 POLYSOM 6/>YRS CPAP 4/> PARM: CPT

## 2022-02-19 NOTE — PROGRESS NOTES
Title:  CPAP/BiLevel Titration's    Approved by:  Duane Citron MD      Approval Date:  December, 2019 Next Review:  December, 2021     Responsible Party:  [facility] [employer] Applies to:   Tony Ji Number:  None    Document Type:  Such as Guideline, Policy, Policy & Procedure, or Procedure, Instructions  Manual:  Policy and Procedures    Section: IV Policy Start Date: October, 0382           POLICY: Positive airway pressure device is used to treat patients with sleep related breathing disorders characterized by full or partial occlusion of the upper airway during sleep. A patient must have undergone polysomnography and diagnosed with obstructive sleep apnea. All individuals who record sleep studies must follow best practices for CPAP/bilevel/ASV titrations in order to attain the ideal pressure setting for their patients. Too low of pressure may cause patients to either be sub-optimally treated or to wake up in a panic. Too much pressure may cause the patient to experience bloating or mask leakage. Determining the appropriate pressure setting for each patient will lead to improved adherence and outcome. Titrations are not an exact science, and it is understood that technologists may need to make minor changes for individual patients. The procedures outlined below are meant to be a guideline and follow the spirit of the AASM clinical guidelines. PROCEDURE:    CPAP:    1. Review the patients pertinent medical al history, previous sleep study or studies to ass the severity of sleep disordered breathing. Review of pertinent information will help to attain a better titration. 2. Applications of electrodes, montages, filters, sensitivities and scoring will be performed according to the current version of the AASM Scoring Manual.   3. Prior to initiating study collect all appropriate PAP supplies  a. Tubing   b.  Humidifier (filled with distilled water)  c. Masks   4. The technologist should assess and measure patient for most appropriate mask prior to start of study. 5. CPAP should be initiated at 5 cm H20.  a. More pressure at start of study may be necessary if patient is morbidly obese or unable to fall asleep on a pressure of 5 cm H20   6. If apneas or frequent hypopneas are present, pressure settings should be increased by 2 cm H20. If occasional hypopneas, snoring, or mask flow limitation are present, pressure settings should be increased by 1 cm H20 and maintained for at least fie minutes to determine if events improve or resolve. Pressure settings may need to be increased more quickly during REM sleep given the limited amount of REM during sleep and the need to treat events during this stage. 7. If a mask leak occurs, the tech should first fix the leakage before raising the pressure. Otherwise, the final pressure setting chosen for the patient may be too high. Once the mask leak has been fixed, decrease the pressure to the last setting where mouth breathing and/or mask leakage was not present, and then re-titrate as indicated. Make sure to document directly on the study the steps taken to resolve the leak and the type of masks used. Pressure setting usually do not need to be set as high with a nasal-mask than with a full-face mask. 8. The recording technologist should document directly on the study at least every 30 minutes. 9. If the patient takes a break from wearing the mask, do not decrease the CPAP pressure on attempted return to sleep unless the patient remains awake for 15 minutes or the patient specifically requests that the pressure be lowered. 10. Do not raise pressure for central apneas. If the patient develops central apneas, pressure setting may need to be lowered. 11. If the patient is unable to tolerate CPAP secondary due to:  a. Persistent mouth breathing despite use of a full-face mask/chin strap  b.  Inability to exhale against higher expiratory pressures (typically beginning anywhere from 15 to 20 cm of H20.  c. Has frequent central apneas, the use of bilevel positive airway pressure may be indicated. Document directly on study why the patient is being switched from CPAP to bilevel. 12. Ensure that supine sleep has been seen on the chosen setting. Going above the chosen setting 1 or 2 cm H20 to show range may be helpful to ensure that the correct pressure has been established. BiLEVEL:    1. Technologist may change from CPAP to bilevel during a study if proven the patient is unable to tolerate CPAP. 2. Review the patients pertinent medical al history, previous sleep study or studies to ass the severity of sleep disordered breathing. Review of pertinent information will help to attain a better titration. 3. Applications of electrodes, montages, filters, sensitivities and scoring will be performed according to the current version of the AASM Scoring Manual.   4. Prior to initiating study collect all appropriate PAP supplies  a. Tubing   b. Humidifier (filled with distilled water)  c. Masks   5. The technologist should assess and measure patient for most appropriate mask prior to start of study. 6. If the patient has not previously been on CPAP, beginning pressures should be 8/4 cm H20 or higher if patient is morbidly obese or unable to fall asleep at lower pressures. If the patient has been previously successfully treated on CPAP start expiratory pressure at therapeutic setting and set inspiratory pressure 4 cm H20 higher. If advancing from CPAP to bilevel during a study expiratory pressure should be set at most successful CPAP setting and inspiratory pressure set 4 cm h20 higher. 7. The standard differential pressure utilized during bilevel titrations typically ranges from 3 to 5 cm H20, with 4 cm H20 being the most common.   Higher differential pressures may be needed in patients who are morbidly obese or who have neuromuscular diseases. 8. If apneas or frequent hypopneas are present, inspiratory and expiratory pressure settings should be increased by 2 cm H20. If occasional hypopneas, snoring, or mask flow limitation are present inspiratory and expiratory pressures settings should be increased by 1 cm h20 and maintained for at least 5 minutes to determine if events improve or resolve. Pressure settings may need to be increased more quickly during REM sleep given the limited amount of REM during sleep and the need to treat events during this stage. 9. If a mask leak occurs, the tech should first fix the leakage before raising the pressure. Otherwise, the final pressure setting chosen for the patient may be too high. Once the mask leak has been fixed, decrease the pressure to the last setting where mouth breathing and/or mask leakage was not present, and then re-titrate as indicated. Make sure to document directly on the study the steps taken to resolve the leak and the type of masks used. Pressure setting usually do not need to be set as high with a nasal-mask than with a full-face mask. 10. The recording technologist should document directly on the study at least every 30 minutes. 11. If the patient takes a break from wearing the mask, do not decrease the CPAP pressure on attempted return to sleep unless the patient remains awake for 15 minutes or the patient specifically requests that the pressure be lowered. 12. Do not raise pressure for central apneas. If the patient develops central apneas, pressure setting may need to be lowered. If the patient has central apneas on bilevel, the use of spontaneous (ST) mode may be indicated. a. ST mode may only be used in 2 cases  i. An order for ST mode with a primary diagnosis of central sleep apnea  ii. During a titration if obstructive events are less than 5/ hour and centrals must be greater than 50% of total respiratory events.    13. Ensure that supine sleep has been seen on the chosen setting. Going above the chosen setting 1 or 2 cm H20 to show range may be helpful to ensure that the correct pressure has been established.

## 2022-02-21 DIAGNOSIS — G47.33 OSA (OBSTRUCTIVE SLEEP APNEA): Primary | ICD-10-CM

## 2022-02-21 DIAGNOSIS — F32.A DEPRESSION, UNSPECIFIED DEPRESSION TYPE: ICD-10-CM

## 2022-02-21 DIAGNOSIS — Z99.89 OSA ON CPAP: ICD-10-CM

## 2022-02-21 DIAGNOSIS — G47.33 OSA ON CPAP: ICD-10-CM

## 2022-02-21 DIAGNOSIS — F31.70 BIPOLAR AFFECTIVE DISORDER IN REMISSION (HCC): ICD-10-CM

## 2022-02-21 LAB — STATUS: NORMAL

## 2022-02-22 NOTE — PROGRESS NOTES
[address] [place] [address]                               SLEEP STUDY REPORT    PATIENT NAME: Nida Montero                :        1960  MED REC NO:   [id number]                           ROOM:  ACCOUNT NO:   [de-identified]                           ADMIT DATE: 2022  PROVIDER:     Yasmani Israel MD    DATE OF STUDY:  2022    REFERRING PROVIDER:  Matheus Silva CNP    Patient height is 66 inches, weight is 187 pounds with a BMI of 30.2. HISTORY:  The patient is a 21-year-old female who underwent initial  baseline sleep study on 2021. The patient was diagnosed with mild  obstructive sleep apnea. The patient had associated comorbidities  including bipolar disorder, depression. The patient is scheduled for  CPAP titration as a treatment. METHODS:  The patient underwent digital polysomnography in compliance  with the standards and specifications from the AASM Manual including the  simultaneous recording of 3 EEG channels (F4-M1, C4-M1, and O2-M1 with  back up electrodes F3-M2, C3-M2, and O1-M2), 2 EOG channels (E1-M2, and  E2-M1,), EMG (chin, left & right leg), EKG, Nonin pulse oximetry with   less than 2 second averaging time, body position, airflow recorded by  oral-nasal thermal sensor and nasal air pressure transducer, plus  respiratory effort recorded by calibrated respiratory inductance  plethysmography (RIP), flow volume loop, sound and video. Sleep staging  and scoring followed the standard put forth by the American Academy of  Sleep Medicine and utilized the 1B obstructive hypopnea event  desaturation of 4 percent or greater. INTERPRETATION:  This is a CPAP titration study, and the study was  performed on 2022.   The study was started at 09:04 p.m. and was  terminated at 05:01 a.m. with a total recording time of 477.1 minutes,  sleep period time was 440.8 minutes, total sleep time was 381.8 minutes,  and overall sleep efficiency was 80%. The sleep onset latency was 36.3  minutes, and wake after sleep onset was 59 minutes. REM sleep latency  was 125.5 minutes. SLEEP STAGING AND DISTRIBUTION SUMMARY:  Revealed the patient spent 2.5  minutes in stage I consisting of 0.7% of total sleep time, 225.8 minutes  in stage II consisting of 59.1%, 100 minutes in stage III consisting of  26.2%, 53.5 minutes in REM sleep consisting of 14% of total sleep time. CPAP TITRATION STUDY:  The CPAP titration study was started with a CPAP  pressure of 5 cm of water, and the CPAP pressure was gradually increased  to a CPAP pressure of 8 cm of water by titrating to apneas and  hypopneas. At a CPAP pressure of 8 cm of water, the patient spent 2  hours 18 minutes in bed. Out of 2 hours 18 minutes, the patient slept  for a period of 29.3 minutes in REM sleep and 1 hour 49 minutes in  non-REM sleep. At a CPAP pressure of 8 cm of water, the patient did not  have any apneas or hypopneas with an apnea-hypopnea index of 0. The  maximum oxygen desaturation recorded at this pressure was 92% with mean  oxygen saturation of 95.2%. PERIODIC LIMB MOVEMENT ANALYSIS:  Revealed the patient had a total of 34  periodic limb movements. Out of 34, 3 of them are associated with  arousals with a PLM index of 5.3. PLM arousal index is 0.5. The  patient had a total of 31 spontaneous arousals with a spontaneous  arousal index of 4.9. EKG MONITORING:  Revealed normal sinus rhythm. This titration was performed on room air. IMPRESSION:  1. Mild obstructive sleep apnea. The patient had optimal titration to  a CPAP pressure of 8 cm of water on room air. 2.  Bipolar disorder. 3.  Depression. 4.  Essential hypertension. 5.  Periodic limb movements with no significant arousals.     RECOMMENDATIONS:  For the patient's sleep disordered breathing, we  recommend starting therapy with a CPAP pressure of 8 cm of water with  room air. Specific recommendations include the patient's choice of  interface was F20 medium-size mask. The patient should be scheduled for followup with my clinic in 6 to 8  weeks. I recommended CPAP therapy for clinical reevaluation with review  of download. Thanks to Arnaldo Lutz CNP, for giving me this opportunity to  participate in the care of this pleasant lady.         Robson Richard MD    D: 02/21/2022 20:48:11       T: 02/22/2022 12:47:05     [email or web address]  Job#: [id number]     Doc#: [id number]    CC:

## 2022-03-14 ENCOUNTER — TELEPHONE (OUTPATIENT)
Dept: SLEEP CENTER | Age: 62
End: 2022-03-14

## 2022-03-14 NOTE — TELEPHONE ENCOUNTER
Faxed CPAP setup order to [facility] per patient's request.      Joseph Radford  3/14/2022    Per Ursula Stockton/ abhay

## 2022-07-27 ENCOUNTER — OFFICE VISIT (OUTPATIENT)
Dept: PULMONOLOGY | Age: 62
End: 2022-07-27
Payer: MEDICARE

## 2022-07-27 VITALS
WEIGHT: 181 LBS | DIASTOLIC BLOOD PRESSURE: 70 MMHG | SYSTOLIC BLOOD PRESSURE: 118 MMHG | BODY MASS INDEX: 29.09 KG/M2 | TEMPERATURE: 97.8 F | HEIGHT: 66 IN | HEART RATE: 82 BPM | OXYGEN SATURATION: 95 %

## 2022-07-27 DIAGNOSIS — G40.909 SEIZURE DISORDER (HCC): ICD-10-CM

## 2022-07-27 DIAGNOSIS — G47.10 HYPERSOMNIA: ICD-10-CM

## 2022-07-27 DIAGNOSIS — G47.33 OSA (OBSTRUCTIVE SLEEP APNEA): Primary | ICD-10-CM

## 2022-07-27 DIAGNOSIS — F32.A DEPRESSION, UNSPECIFIED DEPRESSION TYPE: ICD-10-CM

## 2022-07-27 DIAGNOSIS — F31.70 BIPOLAR AFFECTIVE DISORDER IN REMISSION (HCC): ICD-10-CM

## 2022-07-27 PROCEDURE — 99214 OFFICE O/P EST MOD 30 MIN: CPT | Performed by: PHYSICIAN ASSISTANT

## 2022-07-27 PROCEDURE — 1036F TOBACCO NON-USER: CPT | Performed by: PHYSICIAN ASSISTANT

## 2022-07-27 PROCEDURE — G8427 DOCREV CUR MEDS BY ELIG CLIN: HCPCS | Performed by: PHYSICIAN ASSISTANT

## 2022-07-27 PROCEDURE — 3017F COLORECTAL CA SCREEN DOC REV: CPT | Performed by: PHYSICIAN ASSISTANT

## 2022-07-27 PROCEDURE — G8417 CALC BMI ABV UP PARAM F/U: HCPCS | Performed by: PHYSICIAN ASSISTANT

## 2022-07-27 ASSESSMENT — ENCOUNTER SYMPTOMS
NAUSEA: 0
SHORTNESS OF BREATH: 0
EYES NEGATIVE: 1
BACK PAIN: 0
ALLERGIC/IMMUNOLOGIC NEGATIVE: 1
COUGH: 0
DIARRHEA: 0
CHEST TIGHTNESS: 0
STRIDOR: 0
WHEEZING: 0

## 2022-07-27 NOTE — PROGRESS NOTES
[facility]      Taty Palma         [id number]  7/27/2022   Chief Complaint   Patient presents with    Follow-up     7 month ISIDORO follow up after set up         Pt of Dr. Snehal RAMIREZ Download:   Original or initial AHI: 14.3     Date of initial study: 12/07/2021      Compliant  40%     Noncompliant 50 %     PAP Type Cpap   Level  8 cmH2o    Avg Hrs/Day 5 hours 5 minutes   AHI: 5.0   Recorded compliance dates , 06/26/2022 to 07/25/2022  Machine/Mfg:   [x] ResMed    [] Respironics/Dreamstation   Interface:   [] Nasal    [] Nasal pillows   [] FFM      Provider:      [x] [facility]     [][employer]     [] [employer]    [] [address]    [] [employer]               [] [employer]      [] [employer]    [] [address]      [] Other    Neck Size: 17  Mallampati Mallampati 4  ESS:  8  SAQLI: 88    Here is a scan of the most recent download:              Presentation:   Jones Alford presents for sleep medicine follow up for obstructive sleep apnea  Since the last visit, Jones Alford is struggling with PAP. She does not like it. She continues to be tired during the day. She is taking it at night. She lives in a group home. She has large mask leak    Equipment issues: The pressure is  acceptable, the mask is acceptable     Sleep issues:  Do you feel better? No  More rested? No   Better concentration? no    Progress History:   Since last visit any new medical issues? No  New ER or hospital visits? No  Any new or changes in medicines? No  Any new sleep medicines? No    Review of Systems -   Review of Systems   Constitutional:  Negative for activity change, appetite change, chills and fever. HENT:  Negative for congestion and postnasal drip. Eyes: Negative. Respiratory:  Negative for cough, chest tightness, shortness of breath, wheezing and stridor. Cardiovascular:  Negative for chest pain and leg swelling. Gastrointestinal:  Negative for diarrhea and nausea. Endocrine: Negative. Genitourinary: Negative. Musculoskeletal: Negative. Negative for arthralgias and back pain. Skin: Negative. Allergic/Immunologic: Negative. Neurological: Negative. Negative for dizziness and light-headedness. Psychiatric/Behavioral: Negative. All other systems reviewed and are negative. Physical Exam:    BMI:  Body mass index is 29.21 kg/m². Wt Readings from Last 3 Encounters:   07/27/22 181 lb (82.1 kg)   06/29/22 178 lb 3.2 oz (80.8 kg)   12/15/21 187 lb (84.8 kg)     Weight stable / unchanged  Vitals: /70   Pulse 82   Temp 97.8 °F (36.6 °C)   Ht 5' 6\" (1.676 m)   Wt 181 lb (82.1 kg)   SpO2 95% Comment: r/a  BMI 29.21 kg/m²       Physical Exam  Constitutional:       Appearance: Normal appearance. She is normal weight. HENT:      Head: Normocephalic and atraumatic. Right Ear: External ear normal.      Left Ear: External ear normal.      Nose: Nose normal.   Eyes:      Extraocular Movements: Extraocular movements intact. Conjunctiva/sclera: Conjunctivae normal.      Pupils: Pupils are equal, round, and reactive to light. Pulmonary:      Effort: Pulmonary effort is normal.   Musculoskeletal:      Cervical back: Normal range of motion and neck supple. Neurological:      General: No focal deficit present. Mental Status: She is alert and oriented to person, place, and time. Psychiatric:         Attention and Perception: Attention and perception normal.         Mood and Affect: Mood and affect normal.         Speech: Speech normal.         Behavior: Behavior normal. Behavior is cooperative. Thought Content: Thought content normal.         Cognition and Memory: Cognition normal.         Judgment: Judgment normal.         ASSESSMENT/DIAGNOSIS     Diagnosis Orders   1. ISIDORO (obstructive sleep apnea)        2. Bipolar affective disorder in remission ([facility] [address]        3. Depression, unspecified depression type        4. Seizure disorder ([facility] [address]        5.

## 2022-10-25 ENCOUNTER — HOSPITAL ENCOUNTER (OUTPATIENT)
Dept: NON INVASIVE DIAGNOSTICS | Age: 62
Discharge: HOME | End: 2022-10-25
Payer: MEDICARE

## 2022-10-25 DIAGNOSIS — I35.0 AORTIC VALVE STENOSIS, ETIOLOGY OF CARDIAC VALVE DISEASE UNSPECIFIED: ICD-10-CM

## 2022-10-25 DIAGNOSIS — R42 DIZZINESS: ICD-10-CM

## 2022-10-25 LAB
LV EF: 63 %
LVEF MODALITY: NORMAL

## 2022-10-25 PROCEDURE — 93306 TTE W/DOPPLER COMPLETE: CPT

## 2022-11-28 NOTE — PROGRESS NOTES
[facility]      [facility]         [id number]  11/29/2022   Chief Complaint   Patient presents with    Follow-up     4mo ISIDORO f/u w/[facility] download. At last ov, the EPR was adjusted for comfort and a mask refit was advised d/t leak. Pt lives in a group home. Accompanied by Rio Adair, the director of the group home. Notes she didn't make compliance for Medicaid to pay for her machine. PPt of Dr. Mariam Lawton     PAP Download:   Original or initial AHI: 14.3     Date of initial study: 12/07/2021        Compliant  10%     Noncompliant 37 %     PAP Type CPAP     Level  8cmH2O   Avg Hrs/Day 3hrs 17mins  AHI: 2.0   Recorded compliance dates , 10/24/22  to 11/22/22   Machine/Mfg:   [x] ResMed    [] Respironics/Dreamstation   Interface:   [] Nasal    [] Nasal pillows   [x] FFM      Provider:      [x] [facility]     [][employer]     [] [employer]    [] [address]    [] [employer]               [] [employer]      [] [employer]    [] [address]      [] Other    Neck Size: Mallampati 3  ESS:  unable to answer  SAQLI: unable to answer    Here is a scan of the most recent download:                Presentation:   Suzette Gowers presents for sleep medicine follow up for obstructive sleep apnea  Since the last visit, Suzette Gowers is not wearing PAP every night. Staff has not been helping her put it on. She still has O2 at home and refuses to wear it. She was placed on oxygen when she had COVID and it was never picked up. According to staff her family practitioner did not want it picked up secondary to her hypoxemia at night. I discussed with staff that her hypoxemia is actually secondary to her obstructive sleep apnea and therefore needs to wear her machine at night and does not require any oxygen they asked for discontinuation order and it will be placed. Is tired during the day and is worse when not wearing PAP    Equipment issues:   The pressure is  acceptable, the mask is unacceptable     Sleep issues:  Do you feel better? No  More rested? No   Better concentration? no    Progress History:   Since last visit any new medical issues? No  New ER or hospital visits? No  Any new or changes in medicines? No  Any new sleep medicines? No    Review of Systems -   Review of Systems   Constitutional:  Negative for activity change, appetite change, chills and fever. HENT:  Negative for congestion and postnasal drip. Eyes: Negative. Respiratory:  Negative for cough, chest tightness, shortness of breath, wheezing and stridor. Cardiovascular:  Negative for chest pain and leg swelling. Gastrointestinal:  Negative for diarrhea and nausea. Endocrine: Negative. Genitourinary: Negative. Musculoskeletal: Negative. Negative for arthralgias and back pain. Skin: Negative. Allergic/Immunologic: Negative. Neurological: Negative. Negative for dizziness and light-headedness. Psychiatric/Behavioral:  Positive for decreased concentration. All other systems reviewed and are negative. Physical Exam:    BMI:  Body mass index is 29.15 kg/m². Wt Readings from Last 3 Encounters:   11/29/22 180 lb 9.6 oz (81.9 kg)   07/27/22 181 lb (82.1 kg)   06/29/22 178 lb 3.2 oz (80.8 kg)     Weight stable / unchanged  Vitals: /76 (Site: Left Upper Arm, Position: Sitting, Cuff Size: Large Adult)   Pulse 79   Ht 5' 6\" (1.676 m)   Wt 180 lb 9.6 oz (81.9 kg)   SpO2 93% Comment: r/a  BMI 29.15 kg/m²       Physical Exam  Constitutional:       Appearance: Normal appearance. She is normal weight. HENT:      Head: Normocephalic and atraumatic. Right Ear: External ear normal.      Left Ear: External ear normal.      Nose: Nose normal.   Eyes:      Extraocular Movements: Extraocular movements intact. Conjunctiva/sclera: Conjunctivae normal.      Pupils: Pupils are equal, round, and reactive to light.    Pulmonary:      Effort: Pulmonary effort is normal.   Musculoskeletal: Cervical back: Normal range of motion and neck supple. Neurological:      General: No focal deficit present. Mental Status: She is alert and oriented to person, place, and time. Psychiatric:         Attention and Perception: She is inattentive. Behavior: Behavior is slowed. Behavior is cooperative. Cognition and Memory: Cognition is impaired. Judgment: Judgment normal.         ASSESSMENT/DIAGNOSIS     Diagnosis Orders   1. ISIDORO (obstructive sleep apnea)  DME Order for Home Oxygen as OP      2. Bipolar affective disorder in remission ([facility] [address]        3. Hypersomnia        4. Mental developmental delay                 Plan   Do you need any equipment today? No  -Discussed with staff the need to try to place PAP on her every night.  -Download was reviewed in detail  -They will use the download to determine which staff members have not been assisting with helping her at night. -Given her mental delay, compliance will likely always be an issue.  -She does not qualify for oxygen at night secondary to obstructive sleep apnea, the treatment is PAP therapy. - Order will be placed to discontinue oxygen  -Hypersomnia improves with improved compliance  - Download reviewed and discussed with patient  - She  was advised to continue current positive airway pressure therapy with above described pressure. - She  advised to keep good compliance with current recommended pressure to get optimal results and clinical improvement  - Recommend 7-9 hours of sleep with PAP  - She was advised to call DME company regarding supplies if needed.   -She call my office for earlier appointment if needed for worsening of sleep symptoms.   - She was instructed on weight loss  - Phi Perdomo was educated about my impression and plan. Patient verbalizesunderstanding.   We will see Leopoldo Breech back in: 4 months with download    Information added by my medical assistant/LPN was reviewed today      Dayrl Murguia Ekaterina Saavedra, [facility] for pulmonary and Sleep Medicine  11/29/2022

## 2022-11-29 ENCOUNTER — OFFICE VISIT (OUTPATIENT)
Dept: PULMONOLOGY | Age: 62
End: 2022-11-29
Payer: MEDICARE

## 2022-11-29 VITALS
HEIGHT: 66 IN | BODY MASS INDEX: 29.02 KG/M2 | HEART RATE: 79 BPM | WEIGHT: 180.6 LBS | DIASTOLIC BLOOD PRESSURE: 76 MMHG | SYSTOLIC BLOOD PRESSURE: 124 MMHG | OXYGEN SATURATION: 93 %

## 2022-11-29 DIAGNOSIS — F81.9 MENTAL DEVELOPMENTAL DELAY: ICD-10-CM

## 2022-11-29 DIAGNOSIS — G47.33 OSA (OBSTRUCTIVE SLEEP APNEA): Primary | ICD-10-CM

## 2022-11-29 DIAGNOSIS — F31.70 BIPOLAR AFFECTIVE DISORDER IN REMISSION (HCC): ICD-10-CM

## 2022-11-29 DIAGNOSIS — G47.10 HYPERSOMNIA: ICD-10-CM

## 2022-11-29 PROCEDURE — 1036F TOBACCO NON-USER: CPT | Performed by: PHYSICIAN ASSISTANT

## 2022-11-29 PROCEDURE — G8417 CALC BMI ABV UP PARAM F/U: HCPCS | Performed by: PHYSICIAN ASSISTANT

## 2022-11-29 PROCEDURE — 99214 OFFICE O/P EST MOD 30 MIN: CPT | Performed by: PHYSICIAN ASSISTANT

## 2022-11-29 PROCEDURE — 3017F COLORECTAL CA SCREEN DOC REV: CPT | Performed by: PHYSICIAN ASSISTANT

## 2022-11-29 PROCEDURE — G8484 FLU IMMUNIZE NO ADMIN: HCPCS | Performed by: PHYSICIAN ASSISTANT

## 2022-11-29 PROCEDURE — G8427 DOCREV CUR MEDS BY ELIG CLIN: HCPCS | Performed by: PHYSICIAN ASSISTANT

## 2022-11-29 ASSESSMENT — ENCOUNTER SYMPTOMS
CHEST TIGHTNESS: 0
EYES NEGATIVE: 1
COUGH: 0
STRIDOR: 0
DIARRHEA: 0
BACK PAIN: 0
NAUSEA: 0
WHEEZING: 0
ALLERGIC/IMMUNOLOGIC NEGATIVE: 1
SHORTNESS OF BREATH: 0

## 2022-12-22 ENCOUNTER — OFFICE VISIT (OUTPATIENT)
Dept: CARDIOLOGY CLINIC | Age: 62
End: 2022-12-22

## 2022-12-22 VITALS
WEIGHT: 176 LBS | BODY MASS INDEX: 28.28 KG/M2 | HEART RATE: 76 BPM | HEIGHT: 66 IN | SYSTOLIC BLOOD PRESSURE: 108 MMHG | DIASTOLIC BLOOD PRESSURE: 50 MMHG

## 2022-12-22 DIAGNOSIS — I35.0 AORTIC VALVE STENOSIS, ETIOLOGY OF CARDIAC VALVE DISEASE UNSPECIFIED: ICD-10-CM

## 2022-12-22 DIAGNOSIS — I50.33 ACUTE ON CHRONIC DIASTOLIC CONGESTIVE HEART FAILURE (HCC): ICD-10-CM

## 2022-12-22 DIAGNOSIS — R07.1 CHEST PAIN ON BREATHING: Primary | ICD-10-CM

## 2022-12-22 NOTE — PROGRESS NOTES
by Nasal route daily, Disp: , Rfl:     lactobacillus (CULTURELLE) capsule, Take 1 capsule by mouth daily, Disp: , Rfl:     aspirin 325 MG tablet, Take 325 mg by mouth daily, Disp: , Rfl:     lithium 300 MG capsule, Take 300 mg by mouth nightly , Disp: , Rfl:     cloZAPine (CLOZARIL) 50 MG tablet, Take 100 mg by mouth daily , Disp: , Rfl:     pravastatin (PRAVACHOL) 40 MG tablet, Take 80 mg by mouth nightly , Disp: , Rfl:     melatonin 3 MG TABS tablet, Take 5 mg by mouth daily , Disp: , Rfl:     oxybutynin (DITROPAN XL) 15 MG CR tablet, Take 30 mg by mouth daily , Disp: , Rfl:     divalproex (DEPAKOTE) 500 MG ER tablet, Take 1 tablet by mouth nightly., Disp: 30 tablet, Rfl:     calcium-vitamin D (OSCAL-500) 500-200 MG-UNIT per tablet, Take 1 tablet by mouth 3 times daily. , Disp: 30 tablet, Rfl:     acetaminophen (TYLENOL) 325 MG tablet, Take 650 mg by mouth every 4 hours as needed for Pain or Fever., Disp: , Rfl:     chlorhexidine (PERIDEX) 0.12 % solution, Take 15 mLs by mouth 2 times daily. , Disp: , Rfl:     fludrocortisone (FLORINEF) 0.1 MG tablet, Take 0.1 mg by mouth daily. , Disp: , Rfl:     levothyroxine (SYNTHROID) 125 MCG tablet, Take 137 mcg by mouth Daily , Disp: , Rfl:     therapeutic multivitamin-minerals (THERAGRAN-M) tablet, Take 1 tablet by mouth daily. , Disp: , Rfl:     Past Medical History  Lily Alonzo  has a past medical history of Arthritis, Bipolar disorder ([address] CHF (congestive heart failure) ([address] Chronic kidney disease, Coarse tremors, Constipation, COPD (chronic obstructive pulmonary disease) ([address] Depression, Gait difficulty, General weakness, GERD (gastroesophageal reflux disease), Hypertension, Mental retardation, Pneumonia, Seizures ([address] Thyroid disease, and UTI (lower urinary tract infection). Social History  Lily Alonzo  reports that she has never smoked. She has never used smokeless tobacco. She reports that she does not drink alcohol and does not use drugs.     Family History  Geovani Ontiveros family history includes Cancer in her mother. Past Surgical History   Past Surgical History:   Procedure Laterality Date    COLONOSCOPY      HYSTERECTOMY (CERVIX STATUS UNKNOWN)      [place] EGD TRANSORAL BIOPSY SINGLE/MULTIPLE Left 2/10/2018    [facility]    [facility]       Subjective:     REVIEW OF SYSTEMS  Constitutional: denies sweats, chills and fever  HENT: denies  congestion, sinus pressure, sneezing and sore throat. Eyes: denies  pain, discharge, redness and itching. Respiratory: denies apnea, cough  Gastrointestinal: denies blood in stool, constipation, diarrhea   Endocrine: denies cold intolerance, heat intolerance, polydipsia. Genitourinary: denies dysuria, enuresis, flank pain and hematuria. Musculoskeletal: denies arthralgias, joint swelling and neck pain. Neurological: denies numbness and headaches. Psychiatric/Behavioral: denies agitation, confusion, decreased concentration and dysphoric mood    All others reviewed and are negative. Objective:     BP (!) 108/50   Pulse 76   Ht 5' 6\" (1.676 m)   Wt 176 lb (79.8 kg)   BMI 28.41 kg/m²     Wt Readings from Last 3 Encounters:   12/22/22 176 lb (79.8 kg)   11/29/22 180 lb 9.6 oz (81.9 kg)   07/27/22 181 lb (82.1 kg)     BP Readings from Last 3 Encounters:   12/22/22 (!) 108/50   11/29/22 124/76   07/27/22 118/70       PHYSICAL EXAM  Constitutional: Oriented to person, place, and time. Appears well-developed and well-nourished. HENT:   Head: Normocephalic and atraumatic. Eyes: EOM are normal. Pupils are equal, round, and reactive to light. Neck: Normal range of motion. Neck supple. No JVD present. Cardiovascular: Normal rate , SM+ and intact distal pulses. Pulmonary/Chest: Effort normal and breath sounds normal. No respiratory distress. No wheezes. No rales. Abdominal: Soft.  Bowel sounds are normal. No distension. There is no tenderness. Musculoskeletal: Normal range of motion. No edema. Neurological: Alert and oriented to person, place, and time. No cranial nerve deficit. Coordination normal.   Skin: Skin is warm and dry. Psychiatric: Normal mood and affect.        Lab Results   Component Value Date/Time    CKTOTAL 111 03/26/2017 04:10 AM       Lab Results   Component Value Date/Time    WBC 6.3 12/14/2022 08:35 AM    WBC 6.6 02/22/2021 08:16 PM    RBC 4.79 12/14/2022 08:35 AM    HGB 13.4 12/14/2022 08:35 AM    HCT 42.7 12/14/2022 08:35 AM    MCV 89.1 12/14/2022 08:35 AM    MCH 28.0 12/14/2022 08:35 AM    MCHC 31.4 12/14/2022 08:35 AM    RDW 15.6 12/14/2022 08:35 AM     12/14/2022 08:35 AM     02/22/2021 08:16 PM    MPV 10.4 12/14/2022 08:35 AM       Lab Results   Component Value Date/Time     12/14/2022 08:35 AM    K 4.8 12/14/2022 08:35 AM    K 4.5 02/09/2018 05:14 AM     12/14/2022 08:35 AM    CO2 27 12/14/2022 08:35 AM    BUN 21 12/14/2022 08:35 AM    LABALBU 3.9 12/14/2022 08:35 AM    CREATININE 0.86 12/14/2022 08:35 AM    CALCIUM 9.7 12/14/2022 08:35 AM    LABGLOM >90 08/07/2020 09:56 AM    GLUCOSE 114 12/14/2022 08:35 AM       Lab Results   Component Value Date/Time    ALKPHOS 67 12/14/2022 08:35 AM    ALKPHOS 37 01/24/2021 03:37 PM    ALT 15 12/14/2022 08:35 AM    AST 20 12/14/2022 08:35 AM    PROT 7.1 12/14/2022 08:35 AM    BILITOT 0.2 12/14/2022 08:35 AM    BILITOT Negative 03/10/2022 09:20 AM    BILIDIR <0.1 06/13/2019 02:40 PM    LABALBU 3.9 12/14/2022 08:35 AM       Lab Results   Component Value Date/Time    MG 2.1 02/06/2018 10:35 PM       Lab Results   Component Value Date    INR 1.10 02/22/2021    INR 1.18 01/24/2021    INR 1.14 07/21/2020    PROTIME 12.6 02/22/2021    PROTIME 13.6 (H) 01/24/2021    PROTIME 13.1 07/21/2020         Lab Results   Component Value Date/Time    LABA1C 5.7 02/17/2020 08:57 AM       Lab Results   Component Value Date/Time    TRIG 182 12/14/2022 08:35 AM    HDL 33 12/14/2022 08:35 AM    LDLCALC 70 12/14/2022 08:35 AM    LABVLDL 36 12/14/2022 08:35 AM       Lab Results   Component Value Date/Time    TSH 1.460 12/14/2022 08:35 AM         Testing Reviewed:      I haveindividually reviewed the below cardiac tests    EKG:    ECHO:   Results for orders placed during the hospital encounter of 07/13/20   Echo 2D w doppler w color complete    Narrative Transthoracic Echocardiography Report (TTE)     Demographics      Patient Name   [facility]          Gender              Female                  Charlie LOUIE      MR #           [id number]        Race                                                      Ethnicity      Account #      [de-identified]        Room Number      Accession      [id number]        Date of Study       07/13/2020   Number      Date of Birth  1960       Referring Physician Lolis Soliz, [address] [place] [address]      Age            63 year(s)       Leticia Moseley,                                                       [facility]                                      Interpreting        Jose Berger MD                                   Physician     Procedure    Type of Study      TTE procedure:ECHOCARDIOGRAM COMPLETE 2D W DOPPLER W COLOR. Procedure Date  Date: 07/13/2020 Start: 10:08 AM    Study Location: Bedside  Technical Quality: Limited visualization due to poor acoustical window. Indications:Pericardial effusion. Additional Medical History:Hypothyroidism, hypoxia, congestive heart failure    Patient Status: Routine    Height: 66 inches Weight: 192 pounds BSA: 1.97 m^2 BMI: 30.99 kg/m^2    BP: 131/59 mmHg     Conclusions      Summary   Ejection fraction is visually estimated at 55%. Overall left ventricular function is normal.   Normal right ventricular size and function. The aortic valve leaflets were not well visualized.    Aortic valve appears tricuspid. Aortic valve leaflets are somewhat thickened. Aortic valve leaflets are mildly calcified. Moderate aortic stenosis with mean gradient 24 mm Hg and peak velocity 3.2   m/s   Mild tricuspid regurgitation. Moderate localized pericardial effusion posteriorly (measuring 1.88 cm)   with no obvious tamponade physiology. Signature      ----------------------------------------------------------------   Electronically signed by Chloé Rios MD (Interpreting   physician) on 07/14/2020 at 09:47 AM   ----------------------------------------------------------------      Findings      Mitral Valve   Structurally normal mitral valve. Aortic Valve   The aortic valve leaflets were not well visualized. Aortic valve appears tricuspid. Aortic valve leaflets are somewhat thickened. Aortic valve leaflets are mildly calcified. Moderate aortic stenosis with mean gradient 24 mm Hg and peak velocity 3.2   m/s   Mild aortic regurgitation is noted. Tricuspid Valve   Tricuspid valve was not well visualized. Tricuspid valve is structurally normal.   Mild tricuspid regurgitation. Pulmonic Valve   The pulmonic valve was not well visualized . Pulmonic valve is structurally normal.      Left Atrium   Normal size left atrium. Left Ventricle   Ejection fraction is visually estimated at 55%. Overall left ventricular function is normal.      Right Atrium   The right atrium is of normal size. Right Ventricle   Normal right ventricular size and function. Pericardial Effusion   Moderate localized pericardial effusion posteriorly (measuring 1.88 cm)   with no obvious tamponade physiology.      M-Mode/2D Measurements & Calculations      LV Diastolic    LV Systolic Dimension: 2.7   LA Dimension: 3.3 cmAO Root   Dimension: 4.4  cm                           Dimension: 2.5 cmLA Area:   cm              LV Volume Diastolic: 44.5 ml 38.2 cm^2   LV FS:38.6 %    LV Volume Systolic: 27 ml   LV PW LV EDV/LV EDV Index: 00.9   Diastolic: 1 cm NY/98 U^6WI ESV/LV ESV   Septum          Index: 27 ml/14 m^2          RV Diastolic Dimension: 2.6   Diastolic: 1 cm EF Calculated: 69.2 %        cm                                                   LA/Aorta: 1.32                                                Ascending Aorta: 2.9 cm                   LVOT: 1.8 cm                 LA volume/Index: 28.5 ml                                                /14m^2     Doppler Measurements & Calculations      MV Peak E-Wave:     AV Peak Velocity: 321    LVOT Peak Velocity: 116 cm/s   88.7 cm/s           cm/s                     LVOT Mean Velocity: 77.7 cm/s   MV Peak A-Wave:     AV Peak Gradient: 41.22  LVOT Peak Gradient: 5   83.6 cm/s           mmHg                     mmHgLVOT Mean Gradient: 3   MV E/A Ratio: 1.06  AV Mean Velocity: 229    mmHg   MV Peak Gradient:   cm/s   3.15 mmHg           AV Mean Gradient: 21     TV Peak E-Wave: 32.1 cm/s                       mmHg                     TV Peak A-Wave: 45.8 cm/s   MV Deceleration     AV VTI: 72.9 cm   Time: 278 msec      AV Area                  TV Peak Gradient: 0.41 mmHg   MV P1/2t: 81 msec   (Continuity):0.82 cm^2   TR Velocity:271 cm/s   MVA by PHT:2.72                              TR Gradient:29.38 mmHg   cm^2                LVOT VTI: 23.5 cm        PV Peak Velocity: 87.8 cm/s                       AV P1/2t: 703 msec       PV Peak Gradient: 3.08 mmHg   MV E' Septal        IVRT: 92 msec   Velocity: 5.2 cm/s   MV A' Septal   Velocity: 6.7 cm/s  AV DVI (VTI): 0.32AV DVI   MV E' Lateral       (Vmax):0.36   Velocity: 4.8 cm/s   MV A' Lateral   Velocity: 9.1 cm/s   E/E' septal: 17.06   E/E' lateral: 18.48     [email or web address] [id number]%3d       STRESS:    CATH:    Assessment/Plan       Diagnosis Orders   1. Chest pain on breathing  EKG 12 Lead      2.  Aortic valve stenosis, etiology of cardiac valve disease unspecified        3. Acute on chronic diastolic congestive heart failure (HCC)          Moderate Aortic stenosis CATALINO 1.1 sq cm  Moderate pericardial effusion  Multiple pneumonia  COPD  Developmental delay    Recent Echo showed moderate AS with CATALINO 1.1 sq cm  Patient is present with her   Denies any anginal or heart failure  Apparently walks towards one side  Denies any symptoms  Agreed by the coordinator  Had moderate effusion in 2019 also  No changes  Does have some mod AS  Needs routine survillence, next echo in 1 yr  Continue rest of th emanagement  The patient is asked to make an attempt to improve diet and exercise patterns to aid in medical management of this problem. Advised more plant based nutrition/meditarrean diet   Advised patient to call office or seek immediate medical attention if there is any new onset of  any chest pain, sob, palpitations, lightheadedness, dizziness, orthopnea, PND or pedal edema. All medication side effects were discussed in details. Thank youfor allowing me to participate in the care of this patient. Please do not hesitate to contact me for any further questions. Return in about 1 year (around 12/22/2023), or if symptoms worsen or fail to improve, for Review testing, Regular follow up.        Electronically signed by Cleopatra Camilo MD [facility]  12/22/2022 at 9:29 AM EDT

## 2023-01-10 NOTE — TELEPHONE ENCOUNTER
Spoke with pt. No available appts today. Pt confirmed appt for tomorrow.
PERRL/EOMI/conjunctiva clear

## 2023-05-11 ENCOUNTER — HOSPITAL ENCOUNTER (OUTPATIENT)
Dept: PULMONOLOGY | Age: 63
Discharge: HOME | End: 2023-05-11

## 2023-05-11 ENCOUNTER — HOSPITAL ENCOUNTER (OUTPATIENT)
Dept: GENERAL RADIOLOGY | Age: 63
Discharge: HOME | End: 2023-05-11

## 2023-05-11 DIAGNOSIS — Z00.6 EXAMINATION FOR NORMAL COMPARISON FOR CLINICAL RESEARCH: ICD-10-CM

## 2023-05-11 DIAGNOSIS — J18.9 PNEUMONIA DUE TO INFECTIOUS ORGANISM, UNSPECIFIED LATERALITY, UNSPECIFIED PART OF LUNG: ICD-10-CM

## 2023-05-19 ENCOUNTER — OFFICE VISIT (OUTPATIENT)
Dept: PULMONOLOGY | Age: 63
End: 2023-05-19

## 2023-05-19 VITALS
HEART RATE: 62 BPM | SYSTOLIC BLOOD PRESSURE: 122 MMHG | TEMPERATURE: 100.2 F | OXYGEN SATURATION: 94 % | HEIGHT: 66 IN | WEIGHT: 180 LBS | BODY MASS INDEX: 28.93 KG/M2 | DIASTOLIC BLOOD PRESSURE: 58 MMHG

## 2023-05-19 DIAGNOSIS — J69.0 ASPIRATION PNEUMONIA OF LEFT LOWER LOBE, UNSPECIFIED ASPIRATION PNEUMONIA TYPE (HCC): ICD-10-CM

## 2023-05-19 DIAGNOSIS — R05.9 COUGH, UNSPECIFIED TYPE: ICD-10-CM

## 2023-05-19 DIAGNOSIS — F31.9 BIPOLAR 1 DISORDER (HCC): Chronic | ICD-10-CM

## 2023-05-19 DIAGNOSIS — G47.33 OSA (OBSTRUCTIVE SLEEP APNEA): ICD-10-CM

## 2023-05-19 DIAGNOSIS — J18.9 RECURRENT PNEUMONIA: Primary | ICD-10-CM

## 2023-05-19 DIAGNOSIS — U09.9 POST-COVID SYNDROME: ICD-10-CM

## 2023-05-19 NOTE — PROGRESS NOTES
[place] [facility]    Patient: Rogelio Conteh, 60 y.o.   : 1960    Patient of ARNALDO Harrington CNP   Referring Provider: No ref. provider found       Subjective     Chief Complaint   Patient presents with    New Patient     New pulm ref. Ct 3.25.23  & cxr 3.24.23. ref jett Alonzo is here for recurrent pneumonia. Recently patient went to the hospital and they did the CT scan of the chest for the shortness of breath and found to be having good pulmonary Balsam for which patient was started on blood thinner. Patient has history of obstructive sleep apnea followed at the sleep center and she is using the CPAP on regular basis. Patient is living at group home with the  staff support and works in [place]. Patient does not smoke nor drink patient has multiple medical problems as noted above including the mental health disorder. Patient is accompanied by patient caretaker and did not notice any fever, chills, sweating, hemoptysis. Patient gets shortness of breath with walking and trying to get the breathing test with the pulmonary function which she was not able to do it. Patient has a [place] in . No recent travel history no weight changes. Denies any leg pain or discoloration of the legs and other related. Patient has a check of her oxygen which is also within the normal limits. Clinical evaluation is limited because of her mental government disorder and also patient has other medical problems as noted.   Immunizations:  Immunization History   Administered Date(s) Administered    Pneumococcal, PPSV23, PNEUMOVAX 21, (age 2y+), SC/IM, 0.5mL 2014      Past Medical hx   PMH:  Past Medical History:   Diagnosis Date    Arthritis     Bipolar disorder (HCC)     CHF (congestive heart failure) (HCC)     Chronic kidney disease     Coarse tremors     Constipation     COPD (chronic obstructive pulmonary disease)

## 2023-08-02 ENCOUNTER — HOSPITAL ENCOUNTER (OUTPATIENT)
Dept: SPEECH THERAPY | Age: 63
Setting detail: THERAPIES SERIES
Discharge: HOME | End: 2023-08-02
Payer: MEDICARE

## 2023-08-02 PROCEDURE — 92610 EVALUATE SWALLOWING FUNCTION: CPT | Performed by: SPEECH-LANGUAGE PATHOLOGIST

## 2023-08-02 NOTE — PROGRESS NOTES
** PLEASE SIGN, DATE AND TIME CERTIFICATION BELOW AND RETURN TO [facility] OUTPATIENT REHABILITATION (FAX #: [phone]). ATTEST/CO-SIGN IF ACCESSING VIA INContentRealtime. THANK YOU.**    I certify that I have examined the patient below and determined that Physical Medicine and Rehabilitation service is necessary and that I approve the established plan of care for up to 90 days or as specifically noted. Attestation, signature or co-signature of physician indicates approval of certification requirements.    ________________________ ____________ __________  Physician Signature   Date   Time     [facility] THERAPY  [x] CLINICAL SWALLOW EVALUATION  [] DAILY NOTE   [] PROGRESS NOTE [] DISCHARGE NOTE    [x] OUTPATIENT REHABILITATION [facility]   [] [address] [place] [address]    [] [facility]   [] [facility]    Date: 2023  Patient Name:  Sue Gunn  : 1960  MRN: [id number]  CSN: [id number]    Referring Practitioner Timi Mustafa MD   Diagnosis Pneumonia, unspecified organism [J18.9]  Pneumonitis due to inhalation of food and vomit [J69.0]    Treatment Diagnosis Dysphagia    Date of Evaluation 23      Functional Outcome Measure Used [facility] NOMS: swallowing   Functional Outcome Score Level 4 (23)       Insurance: Primary: Payor: [employer] [facility] /  /  / ,   Secondary: MEDICAID [place]   Authorization Information: No precert required   Visit # 1, 1/10 for progress note   Visits Allowed: Unlimited visits based on medical necessity   Recertification Date:    Physician Follow-Up: unknown   Physician Orders: Eval and treat   Pertinent History: [facility] [employer]. Per patient's caregiver, patient has severe Mental Retardation. Reports patient does some coughing when she is eating and drinking. Reports it does not happen all the time.   Patient has had recurrent pneumonia, which the caregiver reports was approximately 2

## 2023-08-11 LAB — LEGIONELLA URINARY AG: NEGATIVE

## 2023-08-14 LAB
IGE: 6104 KU/L
PROTEIN S ANTIGEN, TOTAL: 133 % (ref 60–140)
PROTEIN S, FUNCTIONAL: 80 % (ref 57–131)
QUANTI TB1 MINUS NIL: 0.02 IU/ML
QUANTI TB2 MINUS NIL: 0.02 IU/ML
QUANTIFERON MITOGEN MINUS NIL: 7.74 IU/ML
QUANTIFERON NIL: 0.04 IU/ML
QUANTIFERON TB GOLD PLUS: NEGATIVE

## 2023-08-15 ENCOUNTER — TELEPHONE (OUTPATIENT)
Dept: PULMONOLOGY | Age: 63
End: 2023-08-15

## 2023-08-15 NOTE — TELEPHONE ENCOUNTER
Patient sees Jeancarlos Pineda next week, they can discuss orders then as Jeancarlos Pineda is our of office until Monday.

## 2023-08-15 NOTE — TELEPHONE ENCOUNTER
Patients caregiver Alonzo Dockery (on hospitals) calling in for patient who was last seen 5/19/2023. She said Dr Elvia Begum referred her to speech therapy. Speech Therapy was recommending a modified barium swallow test and was sending the recommendation to the office for Dr Elvia Begum to order. Alonzo Dockery is calling in to check the status of that being ordered/referred because they haven't heard anything yet.

## 2023-08-21 ENCOUNTER — HOSPITAL ENCOUNTER (OUTPATIENT)
Dept: CT IMAGING | Age: 63
Discharge: HOME | End: 2023-08-21
Payer: MEDICARE

## 2023-08-21 DIAGNOSIS — J69.0 ASPIRATION PNEUMONIA OF LEFT LOWER LOBE, UNSPECIFIED ASPIRATION PNEUMONIA TYPE (HCC): ICD-10-CM

## 2023-08-21 DIAGNOSIS — R05.9 COUGH, UNSPECIFIED TYPE: ICD-10-CM

## 2023-08-21 DIAGNOSIS — J18.9 RECURRENT PNEUMONIA: ICD-10-CM

## 2023-08-21 PROCEDURE — 71250 CT THORAX DX C-: CPT

## 2023-08-24 ENCOUNTER — OFFICE VISIT (OUTPATIENT)
Dept: PULMONOLOGY | Age: 63
End: 2023-08-24

## 2023-08-24 ENCOUNTER — TELEPHONE (OUTPATIENT)
Dept: CARDIOLOGY CLINIC | Age: 63
End: 2023-08-24

## 2023-08-24 VITALS
TEMPERATURE: 96.8 F | DIASTOLIC BLOOD PRESSURE: 62 MMHG | WEIGHT: 187.2 LBS | HEIGHT: 66 IN | HEART RATE: 86 BPM | BODY MASS INDEX: 30.08 KG/M2 | OXYGEN SATURATION: 95 % | SYSTOLIC BLOOD PRESSURE: 120 MMHG

## 2023-08-24 DIAGNOSIS — F81.9 MENTAL DEVELOPMENTAL DELAY: ICD-10-CM

## 2023-08-24 DIAGNOSIS — J69.0 ASPIRATION PNEUMONIA OF LEFT LOWER LOBE, UNSPECIFIED ASPIRATION PNEUMONIA TYPE (HCC): ICD-10-CM

## 2023-08-24 DIAGNOSIS — F32.A DEPRESSION, UNSPECIFIED DEPRESSION TYPE: ICD-10-CM

## 2023-08-24 DIAGNOSIS — U09.9 POST-COVID SYNDROME: ICD-10-CM

## 2023-08-24 DIAGNOSIS — I26.99 PULMONARY EMBOLISM, UNSPECIFIED CHRONICITY, UNSPECIFIED PULMONARY EMBOLISM TYPE, UNSPECIFIED WHETHER ACUTE COR PULMONALE PRESENT (HCC): ICD-10-CM

## 2023-08-24 DIAGNOSIS — D82.4 HYPER-IGE SYNDROME (HCC): ICD-10-CM

## 2023-08-24 DIAGNOSIS — F31.70 BIPOLAR AFFECTIVE DISORDER IN REMISSION (HCC): ICD-10-CM

## 2023-08-24 DIAGNOSIS — J18.9 RECURRENT PNEUMONIA: Primary | ICD-10-CM

## 2023-08-24 DIAGNOSIS — R76.8 HIGH TOTAL SERUM IGM: ICD-10-CM

## 2023-08-24 DIAGNOSIS — I95.0 IDIOPATHIC HYPOTENSION: ICD-10-CM

## 2023-08-24 DIAGNOSIS — G47.33 OSA (OBSTRUCTIVE SLEEP APNEA): ICD-10-CM

## 2023-08-24 RX ORDER — LACTULOSE 10 G/15ML
SOLUTION ORAL
COMMUNITY

## 2023-08-24 NOTE — TELEPHONE ENCOUNTER
Pt's daughter called, stating pt had a chest CT completed with Dr Moy Dasilva on 8/21/23 that showed pt had an enlarged heart and fluid surrounding her heart. Pt is not scheduled for a follow up appt with Dr Naomie Clements until 12/21/23. Pt's daughter wants to know if pt should be seen sooner?     Callback number is [phone]

## 2023-08-24 NOTE — PROGRESS NOTES
[place] for Pul[facility] Medicine and Critical Care    Patient: Pastora Tilley, 58 y.o.   : 1960    Patient of Derk Gosselin, APRN - CNP   Referring Provider: No ref. provider found       Subjective     Chief Complaint   Patient presents with    Follow-up     3 mo recurrent pna f/u w ct. .. see tele encounter [id number] regarding mbs order         HPI  Stella Reynolds was evaluated by me  on 2023 for recurrent pneumonia with shortness of breath. Patient had a diagnosis of pulmonary embolism and was started on blood thinner. Patient also obstructive sleep apnea followed by the sleep center and patient use the CPAP regularly. Patient is in a group home with  staff support and works in [place]. Other problems are as noted. In view of recurrent pneumonia and other mental condition, it was decided to refer the patient to speech pathologist who recommended the modified barium swallow and the patient is here for the follow-up and also patient had the CT scan of the chest which showing the cardiomegaly and slight pericardial effusion and other is as noted. Patient is not the best historian and most of the history is obtained by the caretaker.     Immunizations:  Immunization History   Administered Date(s) Administered    Pneumococcal, PPSV23, PNEUMOVAX 21, (age 2y+), SC/IM, 0.5mL 2014      Past Medical hx   PMH:  Past Medical History:   Diagnosis Date    Arthritis     Bipolar disorder (HCC)     CHF (congestive heart failure) (HCC)     Chronic kidney disease     Coarse tremors     Constipation     COPD (chronic obstructive pulmonary disease) (HCC)     Depression     Gait difficulty     General weakness     GERD (gastroesophageal reflux disease)     Hypertension     Mental retardation     Pneumonia     Seizures (HCC)     Thyroid disease     UTI (lower urinary tract infection)      SURGICAL HISTORY:  Past Surgical History:   Procedure Laterality Date    COLONOSCOPY

## 2023-08-24 NOTE — TELEPHONE ENCOUNTER
Dr. Marcus Aguilar please review CT Chest completed on 8-21-23; does pt need sooner OV than 12-21-23?

## 2023-08-25 ENCOUNTER — TELEPHONE (OUTPATIENT)
Dept: PULMONOLOGY | Age: 63
End: 2023-08-25

## 2023-08-25 NOTE — TELEPHONE ENCOUNTER
Denise Collado is calling from [facility] out pt rehab regarding the orders they received for the modified barium swallow. She said that it is Needing diagnosis code for dysphagia, any type of dysphagia.     Please advise

## 2023-09-05 DIAGNOSIS — J18.9 RECURRENT PNEUMONIA: Primary | ICD-10-CM

## 2023-09-05 DIAGNOSIS — J69.0 ASPIRATION PNEUMONIA OF LEFT LOWER LOBE, UNSPECIFIED ASPIRATION PNEUMONIA TYPE (HCC): ICD-10-CM

## 2023-09-05 DIAGNOSIS — R13.10 DYSPHAGIA, UNSPECIFIED TYPE: ICD-10-CM

## 2023-09-05 LAB — PROTEIN C ANTIGEN: 97 % (ref 66–138)

## 2023-09-27 RX ORDER — SODIUM CHLORIDE 0.9 % (FLUSH) 0.9 %
5-40 SYRINGE (ML) INJECTION EVERY 12 HOURS SCHEDULED
Status: DISCONTINUED | OUTPATIENT
Start: 2023-09-27 | End: 2023-09-28

## 2023-09-27 RX ORDER — SODIUM CHLORIDE 9 MG/ML
25 INJECTION, SOLUTION INTRAVENOUS PRN
Status: DISCONTINUED | OUTPATIENT
Start: 2023-09-27 | End: 2023-09-28

## 2023-09-27 RX ORDER — SODIUM CHLORIDE 0.9 % (FLUSH) 0.9 %
5-40 SYRINGE (ML) INJECTION PRN
Status: DISCONTINUED | OUTPATIENT
Start: 2023-09-27 | End: 2023-09-28

## 2023-09-27 RX ORDER — SODIUM CHLORIDE 450 MG/100ML
INJECTION, SOLUTION INTRAVENOUS CONTINUOUS
Status: DISCONTINUED | OUTPATIENT
Start: 2023-09-27 | End: 2023-09-28

## 2023-09-28 ENCOUNTER — OFFICE VISIT (OUTPATIENT)
Dept: ALLERGY | Age: 63
End: 2023-09-28
Payer: MEDICARE

## 2023-09-28 VITALS
BODY MASS INDEX: 28.64 KG/M2 | TEMPERATURE: 97.9 F | DIASTOLIC BLOOD PRESSURE: 56 MMHG | SYSTOLIC BLOOD PRESSURE: 128 MMHG | HEART RATE: 66 BPM | HEIGHT: 66 IN | OXYGEN SATURATION: 98 % | RESPIRATION RATE: 16 BRPM | WEIGHT: 178.2 LBS

## 2023-09-28 DIAGNOSIS — I25.10 ATHEROSCLEROSIS OF CORONARY ARTERY OF NATIVE HEART WITHOUT ANGINA PECTORIS, UNSPECIFIED VESSEL OR LESION TYPE: ICD-10-CM

## 2023-09-28 DIAGNOSIS — Z99.89 CPAP (CONTINUOUS POSITIVE AIRWAY PRESSURE) DEPENDENCE: ICD-10-CM

## 2023-09-28 DIAGNOSIS — R13.10 DYSPHAGIA, UNSPECIFIED TYPE: ICD-10-CM

## 2023-09-28 DIAGNOSIS — R76.8 HIGH TOTAL SERUM IGM: ICD-10-CM

## 2023-09-28 DIAGNOSIS — Z87.01 H/O RECURRENT PNEUMONIA: ICD-10-CM

## 2023-09-28 DIAGNOSIS — I35.0 AORTIC VALVE STENOSIS, ETIOLOGY OF CARDIAC VALVE DISEASE UNSPECIFIED: ICD-10-CM

## 2023-09-28 DIAGNOSIS — G47.33 OSA (OBSTRUCTIVE SLEEP APNEA): ICD-10-CM

## 2023-09-28 DIAGNOSIS — R76.8 ELEVATED IGE LEVEL: Primary | ICD-10-CM

## 2023-09-28 DIAGNOSIS — J30.89 OTHER ALLERGIC RHINITIS: ICD-10-CM

## 2023-09-28 DIAGNOSIS — I51.7 CARDIOMEGALY: ICD-10-CM

## 2023-09-28 PROCEDURE — 1036F TOBACCO NON-USER: CPT | Performed by: NURSE PRACTITIONER

## 2023-09-28 PROCEDURE — G8428 CUR MEDS NOT DOCUMENT: HCPCS | Performed by: NURSE PRACTITIONER

## 2023-09-28 PROCEDURE — 99203 OFFICE O/P NEW LOW 30 MIN: CPT | Performed by: NURSE PRACTITIONER

## 2023-09-28 PROCEDURE — G8417 CALC BMI ABV UP PARAM F/U: HCPCS | Performed by: NURSE PRACTITIONER

## 2023-09-28 PROCEDURE — 3017F COLORECTAL CA SCREEN DOC REV: CPT | Performed by: NURSE PRACTITIONER

## 2023-09-28 RX ORDER — POLYETHYLENE GLYCOL 3350 17 G/17G
17 POWDER, FOR SOLUTION ORAL DAILY
COMMUNITY

## 2023-09-28 RX ORDER — PRAVASTATIN SODIUM 80 MG/1
80 TABLET ORAL NIGHTLY
COMMUNITY

## 2023-09-28 RX ORDER — DOCUSATE SODIUM 100 MG/1
100 CAPSULE, LIQUID FILLED ORAL 2 TIMES DAILY PRN
COMMUNITY

## 2023-09-28 RX ORDER — CLOZAPINE 100 MG/1
100 TABLET ORAL NIGHTLY
COMMUNITY

## 2023-09-28 RX ORDER — OXYBUTYNIN CHLORIDE 15 MG/1
2 TABLET, EXTENDED RELEASE ORAL DAILY
COMMUNITY

## 2023-09-28 RX ORDER — LEVOTHYROXINE SODIUM 0.2 MG/1
200 TABLET ORAL DAILY
COMMUNITY

## 2023-09-28 RX ORDER — CHLORHEXIDINE GLUCONATE 2% 2 G/100ML
15 SOLUTION TOPICAL 2 TIMES DAILY
COMMUNITY
End: 2023-09-28

## 2023-09-28 RX ORDER — M-VIT,TX,IRON,MINS/CALC/FOLIC 27MG-0.4MG
1 TABLET ORAL DAILY
COMMUNITY

## 2023-09-28 RX ORDER — MELATONIN 10 MG
10 CAPSULE ORAL NIGHTLY
COMMUNITY

## 2023-09-28 RX ORDER — ASPIRIN 325 MG
325 TABLET, DELAYED RELEASE (ENTERIC COATED) ORAL DAILY
COMMUNITY

## 2023-09-28 RX ORDER — WITCH HAZEL 50 %
1 PADS, MEDICATED (EA) TOPICAL DAILY
COMMUNITY

## 2023-09-28 ASSESSMENT — ENCOUNTER SYMPTOMS
COUGH: 1
EYE ITCHING: 0

## 2023-09-28 NOTE — PROGRESS NOTES
Comments: Mental delay           DATA:  Lab Review:   Results for orders placed or performed in visit on 08/24/23   Protein C Antigen, Total   Result Value Ref Range    Protein C Antigen 97 66 - 138 %           Assessment/Plan   Vikash Odonnell was seen today for new patient. Diagnoses and all orders for this visit:    Elevated IgE level  -     CBC with Auto Differential; Future  -     ALLERGEN INHALANT MIDWEST COMP 1; Future  -     Strongyloides Ab, IgG; Future  -     Allergen Gluten IgE; Future  -     Wheat IgE; Future    High total serum IgM  -     CBC with Auto Differential; Future  -     ALLERGEN INHALANT MIDWEST COMP 1; Future  -     Strongyloides Ab, IgG; Future    Cardiomegaly    Aortic valve stenosis, etiology of cardiac valve disease unspecified    ISIDORO (obstructive sleep apnea)    CPAP (continuous positive airway pressure) dependence    Atherosclerosis of coronary artery of native heart without angina pectoris, unspecified vessel or lesion type    H/O recurrent pneumonia  -     CBC with Auto Differential; Future  -     ALLERGEN INHALANT MIDWEST COMP 1; Future  -     Strongyloides Ab, IgG; Future    Dysphagia, unspecified type    Other allergic rhinitis  -     CBC with Auto Differential; Future  -     ALLERGEN INHALANT MIDWEST COMP 1; Future  -     Strongyloides Ab, IgG; Future  -     Allergen Gluten IgE; Future  -     Wheat IgE; Future        Return in about 6 weeks (around 11/9/2023) for Lab review. Spent 44  minutes of face-to-face time with the patient with well more than half of the visit being dedicated to the discussion of the various symptom problems, provided education of medications and disease process, as well as discussion of a therapeutic plan for each    Get medical records from residential [facility]. It is very difficult to evaluate this patient when the patient nor the person that is with her is able to provide a detailed history of what is going on with the patient.   I would like to

## 2023-10-02 ENCOUNTER — TELEPHONE (OUTPATIENT)
Dept: ALLERGY | Age: 63
End: 2023-10-02

## 2023-10-03 ENCOUNTER — HOSPITAL ENCOUNTER (OUTPATIENT)
Age: 63
Setting detail: OUTPATIENT SURGERY
Discharge: HOME | End: 2023-10-03
Attending: INTERNAL MEDICINE | Admitting: INTERNAL MEDICINE
Payer: MEDICARE

## 2023-10-03 ENCOUNTER — ANESTHESIA (OUTPATIENT)
Dept: ENDOSCOPY | Age: 63
End: 2023-10-03
Payer: MEDICARE

## 2023-10-03 ENCOUNTER — ANESTHESIA EVENT (OUTPATIENT)
Dept: ENDOSCOPY | Age: 63
End: 2023-10-03
Payer: MEDICARE

## 2023-10-03 VITALS
SYSTOLIC BLOOD PRESSURE: 133 MMHG | DIASTOLIC BLOOD PRESSURE: 100 MMHG | HEART RATE: 54 BPM | HEIGHT: 66 IN | WEIGHT: 177.8 LBS | RESPIRATION RATE: 16 BRPM | TEMPERATURE: 97.1 F | OXYGEN SATURATION: 95 % | BODY MASS INDEX: 28.57 KG/M2

## 2023-10-03 PROCEDURE — 2709999900 HC NON-CHARGEABLE SUPPLY: Performed by: INTERNAL MEDICINE

## 2023-10-03 PROCEDURE — 6360000002 HC RX W HCPCS: Performed by: NURSE ANESTHETIST, CERTIFIED REGISTERED

## 2023-10-03 PROCEDURE — 2500000003 HC RX 250 WO HCPCS: Performed by: NURSE ANESTHETIST, CERTIFIED REGISTERED

## 2023-10-03 PROCEDURE — 3609027000 HC COLONOSCOPY: Performed by: INTERNAL MEDICINE

## 2023-10-03 PROCEDURE — 2580000003 HC RX 258: Performed by: INTERNAL MEDICINE

## 2023-10-03 PROCEDURE — 3700000000 HC ANESTHESIA ATTENDED CARE: Performed by: INTERNAL MEDICINE

## 2023-10-03 PROCEDURE — 7100000010 HC PHASE II RECOVERY - FIRST 15 MIN: Performed by: INTERNAL MEDICINE

## 2023-10-03 RX ORDER — SODIUM CHLORIDE 450 MG/100ML
INJECTION, SOLUTION INTRAVENOUS CONTINUOUS
Status: DISCONTINUED | OUTPATIENT
Start: 2023-10-03 | End: 2023-10-03 | Stop reason: HOSPADM

## 2023-10-03 RX ORDER — LIDOCAINE HYDROCHLORIDE 20 MG/ML
INJECTION, SOLUTION EPIDURAL; INFILTRATION; INTRACAUDAL; PERINEURAL PRN
Status: DISCONTINUED | OUTPATIENT
Start: 2023-10-03 | End: 2023-10-03 | Stop reason: SDUPTHER

## 2023-10-03 RX ORDER — PROPOFOL 10 MG/ML
INJECTION, EMULSION INTRAVENOUS PRN
Status: DISCONTINUED | OUTPATIENT
Start: 2023-10-03 | End: 2023-10-03 | Stop reason: SDUPTHER

## 2023-10-03 RX ADMIN — LIDOCAINE HYDROCHLORIDE 100 MG: 20 INJECTION, SOLUTION EPIDURAL; INFILTRATION; INTRACAUDAL; PERINEURAL at 14:00

## 2023-10-03 RX ADMIN — PROPOFOL 50 MG: 10 INJECTION, EMULSION INTRAVENOUS at 14:00

## 2023-10-03 RX ADMIN — SODIUM CHLORIDE: 4.5 INJECTION, SOLUTION INTRAVENOUS at 13:31

## 2023-10-03 ASSESSMENT — PAIN SCALES - GENERAL: PAINLEVEL_OUTOF10: 0

## 2023-10-03 ASSESSMENT — PAIN - FUNCTIONAL ASSESSMENT: PAIN_FUNCTIONAL_ASSESSMENT: 0-10

## 2023-10-03 NOTE — PROGRESS NOTES
Patient is in phase 2 passing gas, taking fluids.  discussed findings, plan of care,   discharge instructions with pt and .

## 2023-10-03 NOTE — POST SEDATION
[address]  Sedation/Analgesia Post Sedation Record    Patient: Renetta Medellinops: 1960  [facility] Rec#: [id number] Acc#: [id number]   Procedure Performed By: Sahara Mathur MD  Primary Care Physician: ARNALDO Romero CNP    POST-PROCEDURE    Physicians/Assistants: Sahara Mathur MD  Procedure Performed:    Sedation/Anesthesia:     Estimated Blood Loss:          ml  Specimens Removed:  [x]None []Other:      Disposition of Specimen:  []Pathology [x]Other      Complications:   [x]None Immediate []Other:     Post-procedure Diagnosis/Findings:             Recommendations:     Poor prep  Int hemm          Sahara Mathur MD, MD [facility]  Electronically signed 10/3/2023 at 2:16 PM

## 2023-10-03 NOTE — H&P
Jesse  Sedation/Analgesia History & Physical    Patient: Zoraida Vallejo : 1960  [facility] Rec#: [id number] Acc#: [id number]   Provider Performing Procedure: Dung Austin MD  Primary Care Physician: ARNALDO Neil CNP    PRE-PROCEDURE   Full CODE [x]Yes  DNR-CCA/DNR-CC []Yes   Brief History/Pre-Procedure Diagnosis:constipation          MEDICAL HISTORY  []CAD/Valve  []Liver Disease  []Lung Disease []Diabetes  []Hypertension []Renal Disease  []Additional information:       has a past medical history of Arthritis, Bipolar disorder ([address]), CHF (congestive heart failure) ([address]), Chronic kidney disease, Coarse tremors, Constipation, COPD (chronic obstructive pulmonary disease) ([address]), Depression, Gait difficulty, General weakness, GERD (gastroesophageal reflux disease), Hypertension, Mental retardation, Pneumonia, Seizures ([address]), Thyroid disease, and UTI (lower urinary tract infection). SURGICAL HISTORY   has a past surgical history that includes Hysterectomy; pr egd transoral biopsy single/multiple (Left, 2/10/2018); Upper gastrointestinal endoscopy (2018); and Colonoscopy. Additional information:       ALLERGIES   Allergies as of 08/10/2023 - Fully Reviewed 2023   Allergen Reaction Noted    Nsaids  2017     Additional information:       MEDICATIONS   Coumadin Use Last 7 Days [x]No []Yes  Antiplatelet drug therapy use last 7 days  [x]No []Yes  Other anticoagulant use last 7 days  [x]No []Yes  No current facility-administered medications for this encounter. Prior to Admission medications    Medication Sig Start Date End Date Taking?  Authorizing Provider   Lactobacillus (ACIDOPHILUS) TABS Take 1 tablet by mouth daily    Historical Provider, MD   pravastatin (PRAVACHOL) 80 MG tablet Take 1 tablet by mouth at bedtime    Historical Provider, MD   Calcium Carbonate-Vitamin D (OSCAL 500 D-3 PO) Take 1 tablet by mouth in the morning and at bedtime    Historical Provider,

## 2023-10-03 NOTE — PROGRESS NOTES
Colonoscopy unable to complete, photos taken,,  pt tolerated procedure well. Scope Number  cf [id number] used.

## 2023-10-03 NOTE — ANESTHESIA PRE PROCEDURE
Department of Anesthesiology  Preprocedure Note       Name:  Halle Cardenas   Age:  58 y.o.  :  1960                                          MRN:  [id number]         Date:  10/3/2023      Surgeon: Saúl De Luna):  Frederick Newton MD    Procedure: Procedure(s):  COLONOSCOPY    Medications prior to admission:   Prior to Admission medications    Medication Sig Start Date End Date Taking?  Authorizing Provider   apixaban (ELIQUIS) 5 MG TABS tablet Take 1 tablet by mouth 2 times daily   Yes Historical Provider, MD   Lactobacillus (ACIDOPHILUS) TABS Take 1 tablet by mouth daily    Historical Provider, MD   pravastatin (PRAVACHOL) 80 MG tablet Take 1 tablet by mouth at bedtime    Historical Provider, MD   Calcium Carbonate-Vitamin D (OSCAL 500 D-3 PO) Take 1 tablet by mouth in the morning and at bedtime    Historical Provider, MD   oxybutynin (DITROPAN XL) 15 MG extended release tablet Take 2 tablets by mouth daily  Patient not taking: Reported on 10/3/2023    Historical Provider, MD   docusate sodium (COLACE) 100 MG capsule Take 1 capsule by mouth 2 times daily as needed    Historical Provider, MD   aspirin 325 MG EC tablet Take 1 tablet by mouth daily    Historical Provider, MD   Multiple Vitamins-Minerals (THERAPEUTIC MULTIVITAMIN-MINERALS) tablet Take 1 tablet by mouth daily    Historical Provider, MD   polyethylene glycol (GLYCOLAX) 17 GM/SCOOP powder Take 17 g by mouth daily Dissolve in 8 ounce liquid and drink daily  Patient not taking: Reported on 10/3/2023    Historical Provider, MD   melatonin 10 MG CAPS capsule Take 1 capsule by mouth nightly    Historical Provider, MD   cloZAPine (CLOZARIL) 100 MG tablet Take 1 tablet by mouth at bedtime    Historical Provider, MD   levothyroxine (SYNTHROID) 200 MCG tablet Take 1 tablet by mouth Daily Take on empty stomach    Historical Provider, MD   CPAP Machine MISC by Does not apply route Add EPR 1-3 for comfort 22   Kiara Calloway PA-C   pantoprazole

## 2023-10-03 NOTE — ANESTHESIA POSTPROCEDURE EVALUATION
Department of Anesthesiology  Postprocedure Note    Patient: Richelle Rodriguez  MRN: [id number]  YOB: 1960  Date of evaluation: 10/3/2023      Procedure Summary     Date: 10/03/23 Room / Location: [address] [place] [address] / [address] [place] [address]    Anesthesia Start: 3241 Anesthesia Stop: 0916    Procedure: COLONOSCOPY Diagnosis:       Other constipation      (Other constipation [K59.09])    Surgeons: Gavin Ward MD Responsible Provider: Jonatan Andrade DO    Anesthesia Type: MAC ASA Status: 3          Anesthesia Type: No value filed.     Elise Phase I: Elise Score: 10    Elise Phase II:        Anesthesia Post Evaluation    Patient location during evaluation: bedside  Patient participation: complete - patient participated  Level of consciousness: awake and alert  Airway patency: patent  Nausea & Vomiting: no nausea and no vomiting  Complications: no  Cardiovascular status: hemodynamically stable  Respiratory status: acceptable, room air and spontaneous ventilation  Hydration status: euvolemic  Pain management: adequate

## 2023-10-04 NOTE — OP NOTE
continue the laxative,  and the patient may benefit from prolapsed rectum and the internal  hemorrhoid being banded.         Mor Coulter M.D.    D: 10/03/2023 14:26:19       T: 10/03/2023 21:10:48     [email or web address]  Job#: [id number]     Doc#: [id number]    CC:  _____

## 2023-10-17 ENCOUNTER — HOSPITAL ENCOUNTER (OUTPATIENT)
Dept: GENERAL RADIOLOGY | Age: 63
Discharge: HOME | End: 2023-10-17
Attending: SPECIALIST
Payer: MEDICARE

## 2023-10-17 DIAGNOSIS — R13.10 DYSPHAGIA, UNSPECIFIED TYPE: ICD-10-CM

## 2023-10-17 DIAGNOSIS — J69.0 ASPIRATION PNEUMONIA OF LEFT LOWER LOBE, UNSPECIFIED ASPIRATION PNEUMONIA TYPE (HCC): ICD-10-CM

## 2023-10-17 DIAGNOSIS — J18.9 RECURRENT PNEUMONIA: ICD-10-CM

## 2023-10-17 PROCEDURE — 2500000003 HC RX 250 WO HCPCS: Performed by: SPECIALIST

## 2023-10-17 PROCEDURE — 92611 MOTION FLUOROSCOPY/SWALLOW: CPT | Performed by: SPEECH-LANGUAGE PATHOLOGIST

## 2023-10-17 PROCEDURE — 74230 X-RAY XM SWLNG FUNCJ C+: CPT

## 2023-10-17 RX ADMIN — BARIUM SULFATE 30 ML: 400 PASTE ORAL at 09:36

## 2023-10-17 RX ADMIN — BARIUM SULFATE 100 ML: 0.81 POWDER, FOR SUSPENSION ORAL at 09:36

## 2023-10-17 RX ADMIN — BARIUM SULFATE 100 ML: 400 SUSPENSION ORAL at 09:36

## 2023-10-17 NOTE — DISCHARGE SUMMARY
[address] [place] [address]. [facility] RADIOLOGY  Modified Barium Swallow    SLP Individual Minutes  Time In: 0848  Time Out: 3275  Minutes: 50  Timed Code Treatment Minutes: 0 Minutes       Date: 10/17/2023  Patient Name: Zoraida Vallejo      CSN: [id number]   : 1960  (58 y.o.)  Gender: female   Referring Physician:  Dr. Cher Villarreal  Diagnosis: recurrent pneumonia J18.9; dysphagia, unspecified type R13.10; aspiration pneumonia of left lower lobe, unspecified aspiration pneumonia type J69.0  History of Present Illness/Injury: Patient lives in a group home through [address] [place] [address]. Per patient's caregiver, patient has severe Mental Retardation. Reports patient does some coughing when she is eating and drinking. Reports it does not happen all the time. Patient has had recurrent pneumonia, which the caregiver reports was approximately 2 times in the last year. Patient agrees that some things are harder to swallow, including crackers and meats. Patient reports she feels short of breath all the time. Clinical swallow evaluation was completed on 23 with the recommendation to complete a MBS to further assess swallow function for safest diet recommendation. Patient  has a past medical history of Arthritis, Bipolar disorder ([address]), CHF (congestive heart failure) ([address]), Chronic kidney disease, Coarse tremors, Constipation, COPD (chronic obstructive pulmonary disease) ([address]), Depression, Gait difficulty, General weakness, GERD (gastroesophageal reflux disease), Hypertension, Mental retardation, Pneumonia, Seizures ([address]), Thyroid disease, and UTI (lower urinary tract infection). Current Diet: Regular with thin liquids    Pain: No pain reported. SUBJECTIVE:  Patient sitting upright in the radiology suite chair throughout testing. Patient cooperative with multiple redirections throughout.  Caregiver present for history intake, however, waited in the

## 2023-11-03 ENCOUNTER — TELEPHONE (OUTPATIENT)
Dept: PULMONOLOGY | Age: 63
End: 2023-11-03

## 2023-11-03 ENCOUNTER — HOSPITAL ENCOUNTER (EMERGENCY)
Age: 63
Discharge: HOME | End: 2023-11-03
Payer: MEDICARE

## 2023-11-03 VITALS — OXYGEN SATURATION: 96 % | RESPIRATION RATE: 16 BRPM | TEMPERATURE: 98.7 F | HEART RATE: 87 BPM

## 2023-11-03 DIAGNOSIS — H10.9 BACTERIAL CONJUNCTIVITIS OF LEFT EYE: Primary | ICD-10-CM

## 2023-11-03 PROCEDURE — 99213 OFFICE O/P EST LOW 20 MIN: CPT

## 2023-11-03 PROCEDURE — 99213 OFFICE O/P EST LOW 20 MIN: CPT | Performed by: NURSE PRACTITIONER

## 2023-11-03 RX ORDER — TOBRAMYCIN AND DEXAMETHASONE 3; 1 MG/ML; MG/ML
1 SUSPENSION/ DROPS OPHTHALMIC
Qty: 2.5 ML | Refills: 0 | Status: SHIPPED | OUTPATIENT
Start: 2023-11-03 | End: 2023-11-13

## 2023-11-03 ASSESSMENT — ENCOUNTER SYMPTOMS
EYE PAIN: 0
EYE REDNESS: 1
COUGH: 0
EYE DISCHARGE: 1
RHINORRHEA: 0
EYE ITCHING: 0
SORE THROAT: 0
DIARRHEA: 0
PHOTOPHOBIA: 0
VOMITING: 0
TROUBLE SWALLOWING: 0
NAUSEA: 0
SHORTNESS OF BREATH: 0

## 2023-11-03 NOTE — TELEPHONE ENCOUNTER
Received call from Maliha at [facility] office regarding patient. Called dhara back and left her a message for her to give us a call back.

## 2023-11-03 NOTE — ED NOTES
To [facility] with complaints of left eye watery that started yesterday. Redness and green drainage noted upon assessment.       Carloz Felipe RN  11/03/23 5740

## 2023-12-04 ENCOUNTER — TELEPHONE (OUTPATIENT)
Dept: ALLERGY | Age: 63
End: 2023-12-04

## 2023-12-04 NOTE — TELEPHONE ENCOUNTER
Called pt's care giver and spoke with Amadeo Pacheco. Informed pt's labwork needs to be completed for appointment on 12/19/23 and if they could get that done this week so there is not a delay in results. Pt's caregiver verbalized understanding and thanked me.

## 2024-01-10 ENCOUNTER — TELEPHONE (OUTPATIENT)
Dept: CARDIOLOGY CLINIC | Age: 64
End: 2024-01-10

## 2024-01-10 NOTE — TELEPHONE ENCOUNTER
Pt no showed echo scheduled 12-28-23  Call to pt, went to caretaker americo phone  Left msg to call office to reschedule echo

## 2024-01-24 ENCOUNTER — HOSPITAL ENCOUNTER (OUTPATIENT)
Age: 64
Discharge: HOME | End: 2024-01-26
Attending: INTERNAL MEDICINE
Payer: MEDICARE

## 2024-01-24 VITALS
WEIGHT: 179 LBS | SYSTOLIC BLOOD PRESSURE: 110 MMHG | HEIGHT: 66 IN | BODY MASS INDEX: 28.77 KG/M2 | DIASTOLIC BLOOD PRESSURE: 62 MMHG

## 2024-01-24 DIAGNOSIS — I35.0 AORTIC VALVE STENOSIS, ETIOLOGY OF CARDIAC VALVE DISEASE UNSPECIFIED: ICD-10-CM

## 2024-01-24 PROCEDURE — 93306 TTE W/DOPPLER COMPLETE: CPT

## 2024-01-25 LAB
ECHO AO ASC DIAM: 2.3 CM
ECHO AO ASCENDING AORTA INDEX: 1.2 CM/M2
ECHO AV AREA PEAK VELOCITY: 0.9 CM2
ECHO AV AREA VTI: 0.8 CM2
ECHO AV AREA/BSA PEAK VELOCITY: 0.5 CM2/M2
ECHO AV AREA/BSA VTI: 0.4 CM2/M2
ECHO AV CUSP MM: 1 CM
ECHO AV MEAN GRADIENT: 19 MMHG
ECHO AV MEAN VELOCITY: 2 M/S
ECHO AV PEAK GRADIENT: 32 MMHG
ECHO AV PEAK VELOCITY: 2.8 M/S
ECHO AV VELOCITY RATIO: 0.32
ECHO AV VTI: 57.5 CM
ECHO BSA: 1.94 M2
ECHO LA DIAMETER INDEX: 1.62 CM/M2
ECHO LA DIAMETER: 3.1 CM
ECHO LV FRACTIONAL SHORTENING: 33 % (ref 28–44)
ECHO LV INTERNAL DIMENSION DIASTOLE INDEX: 2.09 CM/M2
ECHO LV INTERNAL DIMENSION DIASTOLIC: 4 CM (ref 3.9–5.3)
ECHO LV INTERNAL DIMENSION SYSTOLIC INDEX: 1.41 CM/M2
ECHO LV INTERNAL DIMENSION SYSTOLIC: 2.7 CM
ECHO LV IVSD: 1.2 CM (ref 0.6–0.9)
ECHO LV MASS 2D: 165.5 G (ref 67–162)
ECHO LV MASS INDEX 2D: 86.6 G/M2 (ref 43–95)
ECHO LV POSTERIOR WALL DIASTOLIC: 1.2 CM (ref 0.6–0.9)
ECHO LV RELATIVE WALL THICKNESS RATIO: 0.6
ECHO LVOT AREA: 2.8 CM2
ECHO LVOT AV VTI INDEX: 0.29
ECHO LVOT DIAM: 1.9 CM
ECHO LVOT MEAN GRADIENT: 2 MMHG
ECHO LVOT PEAK GRADIENT: 3 MMHG
ECHO LVOT PEAK VELOCITY: 0.9 M/S
ECHO LVOT STROKE VOLUME INDEX: 24.8 ML/M2
ECHO LVOT SV: 47.3 ML
ECHO LVOT VTI: 16.7 CM
ECHO MV A VELOCITY: 0.76 M/S
ECHO MV E DECELERATION TIME (DT): 349 MS
ECHO MV E VELOCITY: 0.66 M/S
ECHO MV E/A RATIO: 0.87
ECHO MV REGURGITANT PEAK GRADIENT: 34 MMHG
ECHO MV REGURGITANT PEAK VELOCITY: 2.9 M/S
ECHO PULMONARY ARTERY SYSTOLIC PRESSURE (PASP): 35 MMHG
ECHO PV MAX VELOCITY: 0.8 M/S
ECHO PV PEAK GRADIENT: 3 MMHG
ECHO RV INTERNAL DIMENSION: 2.5 CM
ECHO TV E WAVE: 0.7 M/S
ECHO TV REGURGITANT MAX VELOCITY: 2.95 M/S
ECHO TV REGURGITANT PEAK GRADIENT: 35 MMHG

## 2024-02-13 ENCOUNTER — TELEPHONE (OUTPATIENT)
Dept: ALLERGY | Age: 64
End: 2024-02-13

## 2024-02-21 ENCOUNTER — OFFICE VISIT (OUTPATIENT)
Dept: PULMONOLOGY | Age: 64
End: 2024-02-21
Payer: MEDICARE

## 2024-02-21 VITALS
SYSTOLIC BLOOD PRESSURE: 118 MMHG | HEART RATE: 74 BPM | WEIGHT: 173.2 LBS | TEMPERATURE: 97.5 F | BODY MASS INDEX: 27.83 KG/M2 | HEIGHT: 66 IN | DIASTOLIC BLOOD PRESSURE: 64 MMHG | OXYGEN SATURATION: 92 %

## 2024-02-21 DIAGNOSIS — D82.4 HYPER-IGE SYNDROME (HCC): ICD-10-CM

## 2024-02-21 DIAGNOSIS — R91.8 LUNG INFILTRATE ON CT: ICD-10-CM

## 2024-02-21 DIAGNOSIS — U09.9 POST-COVID SYNDROME: Primary | ICD-10-CM

## 2024-02-21 DIAGNOSIS — F31.70 BIPOLAR AFFECTIVE DISORDER IN REMISSION (HCC): ICD-10-CM

## 2024-02-21 DIAGNOSIS — T78.40XD ALLERGY, SUBSEQUENT ENCOUNTER: ICD-10-CM

## 2024-02-21 DIAGNOSIS — T17.908S ASPIRATION INTO AIRWAY, SEQUELA: ICD-10-CM

## 2024-02-21 PROCEDURE — G8427 DOCREV CUR MEDS BY ELIG CLIN: HCPCS | Performed by: SPECIALIST

## 2024-02-21 PROCEDURE — G8484 FLU IMMUNIZE NO ADMIN: HCPCS | Performed by: SPECIALIST

## 2024-02-21 PROCEDURE — 3017F COLORECTAL CA SCREEN DOC REV: CPT | Performed by: SPECIALIST

## 2024-02-21 PROCEDURE — 99214 OFFICE O/P EST MOD 30 MIN: CPT | Performed by: SPECIALIST

## 2024-02-21 PROCEDURE — 1036F TOBACCO NON-USER: CPT | Performed by: SPECIALIST

## 2024-02-21 PROCEDURE — G8417 CALC BMI ABV UP PARAM F/U: HCPCS | Performed by: SPECIALIST

## 2024-02-21 RX ORDER — LACTULOSE 10 G/15ML
SOLUTION ORAL
COMMUNITY
Start: 2024-02-16

## 2024-02-21 RX ORDER — CRANBERRY FRUIT EXTRACT 200 MG
CAPSULE ORAL
COMMUNITY
Start: 2024-02-05

## 2024-02-21 RX ORDER — NEOMYCIN SULFATE, POLYMYXIN B SULFATE, AND DEXAMETHASONE 3.5; 10000; 1 MG/G; [USP'U]/G; MG/G
OINTMENT OPHTHALMIC
COMMUNITY
Start: 2024-01-22

## 2024-02-21 RX ORDER — POLYETHYLENE GLYCOL 3350 17 G/17G
POWDER, FOR SOLUTION ORAL
COMMUNITY
Start: 2024-02-05

## 2024-02-21 RX ORDER — FLUDROCORTISONE ACETATE 0.1 MG/1
TABLET ORAL
COMMUNITY
Start: 2024-02-05

## 2024-02-21 NOTE — PROGRESS NOTES
[facility]    Patient: KATIE GALDAMEZ, 63 y.o.   : 1960    Patient of Geetha Hallman APRN - CNP   Referring Provider: No ref. provider found       Subjective     Chief Complaint   Patient presents with    Follow-up     6 mo f/u with barium swallow  recurrent pna         HPI  Katie is here for follow up for lung problems    Immunizations:  Immunization History   Administered Date(s) Administered    Pneumococcal, PPSV23, PNEUMOVAX 23, (age 2y+), SC/IM, 0.5mL 2014      Past Medical hx   PMH:  Past Medical History:   Diagnosis Date    Arthritis     Bipolar disorder (HCC)     CHF (congestive heart failure) (HCC)     Chronic kidney disease     Coarse tremors     Constipation     COPD (chronic obstructive pulmonary disease) (HCC)     Depression     Gait difficulty     General weakness     GERD (gastroesophageal reflux disease)     Hypertension     Mental retardation     Pneumonia     Seizures (HCC)     Thyroid disease     UTI (lower urinary tract infection)      SURGICAL HISTORY:  Past Surgical History:   Procedure Laterality Date    COLONOSCOPY      COLONOSCOPY N/A 10/3/2023    COLONOSCOPY performed by Kirk Ashraf MD at [facility] ENDOSCOPY    HYSTERECTOMY (CERVIX STATUS UNKNOWN)      DE EGD TRANSORAL BIOPSY SINGLE/MULTIPLE Left 2/10/2018    EGD BIOPSY performed by Nicolas Ramirez MD at [facility] Endoscopy    UPPER GASTROINTESTINAL ENDOSCOPY  2018    DR. RAMIREZ[facility]     SOCIAL HISTORY:  Social History     Tobacco Use    Smoking status: Never     Passive exposure: Never    Smokeless tobacco: Never    Tobacco comments:     Never smoker   Vaping Use    Vaping Use: Never used   Substance Use Topics    Alcohol use: No    Drug use: No     ALLERGIES:  Allergies   Allergen Reactions    Cow's Milk [Milk (Cow)]     Nsaids      Interacts with psych meds     FAMILY HISTORY:  Family History   Problem Relation Age of Onset    Cancer Mother      CURRENT

## 2024-03-21 ENCOUNTER — TELEPHONE (OUTPATIENT)
Dept: ALLERGY | Age: 64
End: 2024-03-21

## 2024-03-21 NOTE — TELEPHONE ENCOUNTER
Instructed by provider to call pt's HIPAA contact on pt's lab results and positive food allergies which are peanuts, shrimp, soybean, carrot, egg white, milk, and white potato. Called HIPAA contact Hilaria and informed of this food allergies. Explained classes and to avoid foods based on classes. Listed in pt's allergies.  Pt's HIPAA contact verbalized understanding and thanked me.

## 2024-03-25 ENCOUNTER — OFFICE VISIT (OUTPATIENT)
Dept: ALLERGY | Age: 64
End: 2024-03-25
Payer: MEDICARE

## 2024-03-25 VITALS
OXYGEN SATURATION: 97 % | SYSTOLIC BLOOD PRESSURE: 143 MMHG | DIASTOLIC BLOOD PRESSURE: 74 MMHG | HEIGHT: 66 IN | HEART RATE: 68 BPM | BODY MASS INDEX: 27.8 KG/M2 | RESPIRATION RATE: 16 BRPM | WEIGHT: 173 LBS

## 2024-03-25 DIAGNOSIS — R76.8 ELEVATED IGE LEVEL: ICD-10-CM

## 2024-03-25 DIAGNOSIS — T17.800A ASPIRATION INTO LOWER RESPIRATORY TRACT, INITIAL ENCOUNTER: ICD-10-CM

## 2024-03-25 DIAGNOSIS — Z91.011 COW'S MILK ALLERGY: ICD-10-CM

## 2024-03-25 DIAGNOSIS — R79.89 HIGH PLASMA HISTAMINE: Primary | ICD-10-CM

## 2024-03-25 DIAGNOSIS — L20.84 ALLERGIC (INTRINSIC) ECZEMA: ICD-10-CM

## 2024-03-25 DIAGNOSIS — D72.10 EOSINOPHILIA, UNSPECIFIED TYPE: ICD-10-CM

## 2024-03-25 DIAGNOSIS — J30.89 OTHER ALLERGIC RHINITIS: ICD-10-CM

## 2024-03-25 DIAGNOSIS — R21 RASH AND NONSPECIFIC SKIN ERUPTION: ICD-10-CM

## 2024-03-25 DIAGNOSIS — Z91.018 FOOD ALLERGY: ICD-10-CM

## 2024-03-25 DIAGNOSIS — F79 MENTAL DISABILITY: ICD-10-CM

## 2024-03-25 DIAGNOSIS — L20.84 INTRINSIC (ALLERGIC) ECZEMA: ICD-10-CM

## 2024-03-25 PROCEDURE — G8417 CALC BMI ABV UP PARAM F/U: HCPCS | Performed by: NURSE PRACTITIONER

## 2024-03-25 PROCEDURE — G8484 FLU IMMUNIZE NO ADMIN: HCPCS | Performed by: NURSE PRACTITIONER

## 2024-03-25 PROCEDURE — 1036F TOBACCO NON-USER: CPT | Performed by: NURSE PRACTITIONER

## 2024-03-25 PROCEDURE — G8427 DOCREV CUR MEDS BY ELIG CLIN: HCPCS | Performed by: NURSE PRACTITIONER

## 2024-03-25 PROCEDURE — 3017F COLORECTAL CA SCREEN DOC REV: CPT | Performed by: NURSE PRACTITIONER

## 2024-03-25 PROCEDURE — 99215 OFFICE O/P EST HI 40 MIN: CPT | Performed by: NURSE PRACTITIONER

## 2024-03-25 RX ORDER — ASPIRIN 81 MG/1
81 TABLET ORAL DAILY
COMMUNITY

## 2024-03-25 RX ORDER — FAMOTIDINE 20 MG/1
20 TABLET, FILM COATED ORAL DAILY
Qty: 90 TABLET | Refills: 1 | Status: SHIPPED | OUTPATIENT
Start: 2024-03-25

## 2024-03-25 RX ORDER — LORATADINE 10 MG/1
10 TABLET ORAL NIGHTLY
Qty: 90 TABLET | Refills: 1 | Status: SHIPPED | OUTPATIENT
Start: 2024-03-25 | End: 2024-03-25

## 2024-03-25 RX ORDER — FLUDROCORTISONE ACETATE 0.1 MG/1
0.1 TABLET ORAL DAILY
COMMUNITY

## 2024-03-25 RX ORDER — LORATADINE 10 MG/1
10 TABLET ORAL NIGHTLY
Qty: 90 TABLET | Refills: 1 | Status: SHIPPED | OUTPATIENT
Start: 2024-03-25

## 2024-03-25 RX ORDER — DIPHENHYDRAMINE HCL 25 MG
25 TABLET ORAL EVERY 4 HOURS PRN
Qty: 30 TABLET | Refills: 1
Start: 2024-03-25

## 2024-03-25 RX ORDER — EPINEPHRINE 0.3 MG/.3ML
0.3 INJECTION SUBCUTANEOUS ONCE
Qty: 1 EACH | Refills: 0 | Status: SHIPPED | OUTPATIENT
Start: 2024-03-25 | End: 2024-03-25

## 2024-03-25 RX ORDER — FAMOTIDINE 20 MG/1
20 TABLET, FILM COATED ORAL DAILY
Qty: 90 TABLET | Refills: 1 | Status: SHIPPED | OUTPATIENT
Start: 2024-03-25 | End: 2024-03-25

## 2024-03-25 NOTE — PATIENT INSTRUCTIONS
Patient Education        epinephrine injection  Pronunciation:  RUTH cardenas  Brand:  Adrenalin, Auvi-Q, Epinephrinesnap-EMS, Epinephrinesnap-V, EpiPen 2-Rudolph, EpiPen JR 2-Rudolph, EPIsnap, Symjepi  What is the most important information I should know about epinephrine injection?  Seek emergency medical attention after any use of epinephrine to treat a severe allergic reaction. After the injection you will need to receive further treatment and observation.  What is epinephrine injection?  Epinephrine injection is used to treat severe allergic reactions (anaphylaxis) to insect stings or bites, foods, drugs, and other allergens.  Epinephrine auto-injectors may be kept on hand for self-injection by a person with a history of severe allergic reaction.  Epinephrine is also used to treat exercise-induced anaphylaxis, or to treat low blood pressure that is caused by septic shock.  Epinephrine injection may also be used for purposes not listed in this medication guide.  What should I discuss with my healthcare provider before using epinephrine injection?  Before using epinephrine, tell your doctor if any past use of this medicine caused an allergic reaction to get worse.   Tell your doctor if you have ever had:  heart disease or high blood pressure;  asthma;  Parkinson's disease;  depression or mental illness;  a thyroid disorder; or  diabetes.  Having an allergic reaction while pregnant or nursing could harm both mother and baby. You may need to use epinephrine during pregnancy or while you are breastfeeding. Seek emergency medical attention right away after using the injection.  If possible during an emergency, tell your medical caregivers if you are pregnant or breastfeeding.  How should I use epinephrine injection?  Follow all directions on your prescription label and read all medication guides or instruction sheets. Use the medicine exactly as directed.  Epinephrine is injected into the skin or muscle of your outer thigh.

## 2024-03-25 NOTE — PROGRESS NOTES
[email or web address]    Allergy & Asthma   [address] [place] [address]  Ph:   [phone]  Fax:[phone]    Provider:  Dr. Cheryl Skelton    Follow Up     CHIEF COMPLAINT:   Chief Complaint   Patient presents with    Results     Lab review/food allergies    caregiver wants to talk about asthma/nurition referral              HISTORY OF PRESENT ILLNESS: ESTABLISHED PATIENT HERE FOR EVALUATION   63-year-old female here today with her caregiver provider for evaluation of ongoing allergic rhinitis, food allergies and other symptoms which could be contributing to pneumonia.  I have reviewed the patient's food allergies and discussed in depth with patient's provider.  Patient has several food allergies.  She loves to eat peanut every day but has a peanut related allergy.  Patient does not have an EpiPen.  Severity patient's food allergies are mild as she has never had anaphylaxis.  She does have frequent bouts of pneumonia.  However inpatient swallow study she does hold a lot of fluid and mucus and food in the back of her throat which she ends up aspirating.  Patient does not sleep in an upright position.  Onset of food allergies is new.  Patient is currently being evaluated for food allergies.  Severity of contributing to her pneumonia is moderate to severe.  She does not have any anaphylaxis.  The caregiver is very engaged in patient's medical care and wants to do everything she can to help with the patient.  The patient's caregiver does report that she has to have thickener added to her fluids that she is unable to tolerate thin fluids.  The patient does not have a mental status and capacity that allows her to make rational decision making          Review of Systems:  Review of Systems   Allergic/Immunologic: Positive for environmental allergies.   Psychiatric/Behavioral:  Positive for confusion and decreased concentration.         Low-level IQ functioning   All other systems reviewed and are

## 2024-03-28 ENCOUNTER — TELEPHONE (OUTPATIENT)
Dept: ALLERGY | Age: 64
End: 2024-03-28

## 2024-03-28 NOTE — TELEPHONE ENCOUNTER
She has been ordered an epi pen but no physical reaction to anything. Staff needs to tell them when to give her this. She wants directions specifically stated for this pen faxed to them for staff. Please fax directions to [phone]. They would simply like very specific directions on the use and when exactly to use this pen. ATTN: Hilaria Stockton  She would also like to know what class 2 peanuts is.

## 2024-03-28 NOTE — TELEPHONE ENCOUNTER
Attempted to call pt's HIPAA. Unable to reach, left message to call office. Instructions for epi pen are on the prescription for anaphlyaxis to inject.

## 2024-04-01 NOTE — TELEPHONE ENCOUNTER
Patients HIPAA called requesting epi pen instructions. Instructed her to check the AVS on page 6 where is says EPI and dosage and also educated HIPAA contact on the phone. Stated she did not have that page, offered to fax AVS back over to HIPAA contact. HIPAA Contact verbalized understanding and thanked me.

## 2024-04-02 NOTE — TELEPHONE ENCOUNTER
Received call from Monica Peck CNP pt's PCP. States that pt had an appointment yesterday along with HIPAA contact. She states HIPAA contact had a lot of concerns with pt's food allergies. HIPAA contact is concerned with the staff working with the pt in a group home they will not know when to inject the epi pen. Monica Peck CNP is asking a note to be faxed to [phone] on exact instructions for anaphylaxis symptoms and where to inject so staff can do for pt. Will inform provider.

## 2024-04-24 ENCOUNTER — HOSPITAL ENCOUNTER (OUTPATIENT)
Dept: CT IMAGING | Age: 64
Discharge: HOME | End: 2024-04-24
Attending: RADIOLOGY

## 2024-04-24 DIAGNOSIS — Z00.6 ENCOUNTER FOR EXAMINATION FOR NORMAL COMPARISON OR CONTROL IN CLINICAL RESEARCH PROGRAM: ICD-10-CM

## 2024-05-08 ENCOUNTER — HOSPITAL ENCOUNTER (OUTPATIENT)
Dept: PHYSICAL THERAPY | Age: 64
Setting detail: THERAPIES SERIES
Discharge: HOME | End: 2024-05-08
Payer: MEDICARE

## 2024-05-08 PROCEDURE — 97161 PT EVAL LOW COMPLEX 20 MIN: CPT

## 2024-05-08 PROCEDURE — 97110 THERAPEUTIC EXERCISES: CPT

## 2024-05-08 NOTE — PROGRESS NOTES
** PLEASE SIGN, DATE AND TIME CERTIFICATION BELOW AND RETURN TO [facility] OUTPATIENT REHABILITATION (FAX #: [phone]).  ATTEST/CO-SIGN IF ACCESSING VIA [email or web address].  THANK YOU.**    I certify that I have examined the patient below and determined that Physical Medicine and Rehabilitation service is necessary and that I approve the established plan of care for up to 90 days or as specifically noted.  Attestation, signature or co-signature of physician indicates approval of certification requirements.    ________________________ ____________ __________  Physician Signature   Date   Time    [facility]  PHYSICAL THERAPY  [x] EVALUATION  [] DAILY NOTE (LAND) [] DAILY NOTE (AQUATIC ) [] PROGRESS NOTE [] DISCHARGE NOTE    [x] OUTPATIENT REHABILITATION [facility]   [] [facility]    [] [facility]   [] [facility]    Date: 2024  Patient Name:  Tracie Castellon  : 1960  MRN: [id number]  CSN: [id number]    Referring Practitioner Jeff Naylor MD    Diagnosis Pain in left hip  Lumbar radiculopathy   Treatment Diagnosis M25.552  Left Hip Pain  M25.652 Stiffness of left hip, not elsewhere classified  M62.81 Generalized Muscle Weakness  R26.2 Difficulty in walking and R29.3 Abnormal Posture  M25.60 Stiffness of Unspecified Joint   Date of Evaluation 24    Additional Pertinent History Chronic scoliosis, leans to the left. CHF, HTN, thyroid. Seizures  Developmental disabilities - increased psych med dosage recently, noticed more fatigue and lateral shifting toward left more often       Functional Outcome Measure Used Bueno Balance Scale   Functional Outcome Score 14/56 (24) - Fall risk      Insurance: Primary: Payor: MEDICARE /  /  / ,   Secondary: MEDICAID [place]   Authorization Information: No visit limit    Approved Procedure Codes: No ionto, no heat/cold   Visit # 1, 1/10 for progress note (Reporting Period: 24 to  24  )   Visits Allowed: No

## 2024-05-14 ENCOUNTER — HOSPITAL ENCOUNTER (OUTPATIENT)
Dept: PHYSICAL THERAPY | Age: 64
Setting detail: THERAPIES SERIES
Discharge: HOME | End: 2024-05-14
Payer: MEDICARE

## 2024-05-14 PROCEDURE — 97110 THERAPEUTIC EXERCISES: CPT

## 2024-05-14 NOTE — PROGRESS NOTES
[facility]  PHYSICAL THERAPY  [] EVALUATION  [x] DAILY NOTE (LAND) [] DAILY NOTE (AQUATIC ) [] PROGRESS NOTE [] DISCHARGE NOTE    [x] OUTPATIENT REHABILITATION CENTER [facility]   [] [facility]    [] [facility]   [] [facility]    Date: 2024  Patient Name:  Tracie Castellon  : 1960  MRN: [id number]  CSN: [id number]    Referring Practitioner Jeff Naylor MD    Diagnosis Pain in left hip  Lumbar radiculopathy   Treatment Diagnosis M25.552  Left Hip Pain  M25.652 Stiffness of left hip, not elsewhere classified  M62.81 Generalized Muscle Weakness  R26.2 Difficulty in walking and R29.3 Abnormal Posture  M25.60 Stiffness of Unspecified Joint   Date of Evaluation 24    Additional Pertinent History Chronic scoliosis, leans to the left. CHF, HTN, thyroid. Seizures  Developmental disabilities - increased psych med dosage recently, noticed more fatigue and lateral shifting toward left more often       Functional Outcome Measure Used Bueno Balance Scale   Functional Outcome Score 14/56 (24) - Fall risk      Insurance: Primary: Payor: MEDICARE /  /  / ,   Secondary: MEDICAID [place]   Authorization Information: No visit limit    Approved Procedure Codes: No ionto, no heat/cold   Visit # 2, 2/10 for progress note (Reporting Period: 24 to  24  )   Visits Allowed: No visit limit   Recertification Date: 7/3/2024   Physician Follow-Up: Nothing scheduled    Physician Orders:    History of Present Illness: Hip XR negative.     Starting in this winter, staff have noticed a decline in her mobility and function. Bobby Miranda has prescribed new shoes this winter with left platform to compensate left hip, more falling and tripping over items, more lateral shift.   Group home staff notice patient has been leaning more to the side, leans on the left elbow and crossed the right leg over, majority of the time. When getting up walking, she wobbles side to side.

## 2024-05-15 ENCOUNTER — OFFICE VISIT (OUTPATIENT)
Dept: INTERNAL MEDICINE CLINIC | Age: 64
End: 2024-05-15
Payer: MEDICARE

## 2024-05-15 VITALS — BODY MASS INDEX: 27.97 KG/M2 | WEIGHT: 174 LBS | HEIGHT: 66 IN

## 2024-05-15 DIAGNOSIS — Z91.011 COW'S MILK ALLERGY: Primary | ICD-10-CM

## 2024-05-15 DIAGNOSIS — R21 RASH AND NONSPECIFIC SKIN ERUPTION: ICD-10-CM

## 2024-05-15 DIAGNOSIS — Z91.018 FOOD ALLERGY: ICD-10-CM

## 2024-05-15 PROCEDURE — 97802 MEDICAL NUTRITION INDIV IN: CPT | Performed by: DIETITIAN, REGISTERED

## 2024-05-15 PROCEDURE — NBSRV NON-BILLABLE SERVICE: Performed by: DIETITIAN, REGISTERED

## 2024-05-15 NOTE — PATIENT INSTRUCTIONS
Follow Milk and peanut allergy guidelines.    Follow recommended swallowing guidelines of mince moist diet and thickened liquids - nectar thick.    Bring a 1 week food log to next dietitian appt.  Thank you.    Call this Dietitian with questions:  [phone]

## 2024-05-15 NOTE — PROGRESS NOTES
[facility]  [facility]  [address] [place] [address]  [phone] (phone)  [phone] (fax)    Patient Name: Tracie Castellon. Date of Birth: [id number]. MRN: [id number]      Assessment: Patient is a 63 y.o. female seen for Initial MNT visit for   Z91.011 (ICD-10-CM) - Cow's milk allergy   Z91.018 (ICD-10-CM) - Food allergy   R21 (ICD-10-CM) - Rash and nonspecific skin eruption     -Nutritionally relevant labs:   Lab Results   Component Value Date/Time    LABA1C 5.7 02/17/2020 08:57 AM    LABA1C 5.3 06/01/2017 06:45 AM    LABA1C 5.6 04/05/2017 07:57 AM    GLUCOSE 118 (H) 12/05/2023 08:55 AM    GLUCOSE 200 (H) 03/24/2023 09:37 PM    CHOL 133 12/05/2023 08:55 AM    CHOL 130 08/07/2020 10:10 AM    HDL 37 (L) 12/05/2023 08:55 AM    TRIG 169 (H) 12/05/2023 08:55 AM     Pt here with Care Provider Yaz Stockton, [facility].  Pt lives in a group home with 2 other ladies with 24/7 care.    Other diet precautions are recently followed to prevent aspiration - Mince Moist Diet with thickened liquids - nectar thickness. Brent provided essential information sheet that includes the speech therapy diet recommendations:  Minced moist diet with thickened liquids. Barium swallow evaluation completed on 10/17/23.    Current Outpatient Medications on File Prior to Visit   Medication Sig Dispense Refill    aspirin 81 MG EC tablet Take 1 tablet by mouth daily      Artificial Tear Solution (GENTEAL TEARS OP) Apply to eye daily      fludrocortisone (FLORINEF) 0.1 MG tablet Take 1 tablet by mouth daily      diphenhydrAMINE (BENADRYL ALLERGY) 25 MG tablet Take 1 tablet by mouth every 4 hours as needed for Itching (for anaphylaxis symptoms) 30 tablet 1    loratadine (CLARITIN) 10 MG tablet Take 1 tablet by mouth at bedtime May take one additional 10 mg loratadine with anaphylaxis 90 tablet 1    famotidine (PEPCID) 20 MG tablet Take 1 tablet by mouth daily May

## 2024-05-16 ENCOUNTER — HOSPITAL ENCOUNTER (OUTPATIENT)
Dept: PHYSICAL THERAPY | Age: 64
Setting detail: THERAPIES SERIES
Discharge: HOME | End: 2024-05-16
Payer: MEDICARE

## 2024-05-16 PROCEDURE — 97110 THERAPEUTIC EXERCISES: CPT

## 2024-05-16 NOTE — PROGRESS NOTES
[facility]  PHYSICAL THERAPY  [] EVALUATION  [x] DAILY NOTE (LAND) [] DAILY NOTE (AQUATIC ) [] PROGRESS NOTE [] DISCHARGE NOTE    [x] OUTPATIENT REHABILITATION CENTER [facility]   [] [facility]    [] [facility]   [] [facility]    Date: 2024  Patient Name:  Tracie Castellon  : 1960  MRN: [id number]  CSN: [id number]    Referring Practitioner Jeff Naylor MD    Diagnosis Pain in left hip  Lumbar radiculopathy   Treatment Diagnosis M25.552  Left Hip Pain  M25.652 Stiffness of left hip, not elsewhere classified  M62.81 Generalized Muscle Weakness  R26.2 Difficulty in walking and R29.3 Abnormal Posture  M25.60 Stiffness of Unspecified Joint   Date of Evaluation 24    Additional Pertinent History Chronic scoliosis, leans to the left. CHF, HTN, thyroid. Seizures  Developmental disabilities - increased psych med dosage recently, noticed more fatigue and lateral shifting toward left more often       Functional Outcome Measure Used Bueno Balance Scale   Functional Outcome Score 14/56 (24) - Fall risk      Insurance: Primary: Payor: MEDICARE /  /  / ,   Secondary: MEDICAID [place]   Authorization Information: No visit limit    Approved Procedure Codes: No ionto, no heat/cold   Visit # 3, 3/10 for progress note (Reporting Period: 24 to  24  )   Visits Allowed: No visit limit   Recertification Date: 7/3/2024   Physician Follow-Up: Nothing scheduled    Physician Orders:    History of Present Illness: Hip XR negative.     Starting in this winter, staff have noticed a decline in her mobility and function. Bobby Miranda has prescribed new shoes this winter with left platform to compensate left hip, more falling and tripping over items, more lateral shift.   Group home staff notice patient has been leaning more to the side, leans on the left elbow and crossed the right leg over, majority of the time. When getting up walking, she wobbles side to side.

## 2024-05-21 ENCOUNTER — HOSPITAL ENCOUNTER (OUTPATIENT)
Dept: PHYSICAL THERAPY | Age: 64
Setting detail: THERAPIES SERIES
Discharge: HOME | End: 2024-05-21
Payer: MEDICARE

## 2024-05-21 PROCEDURE — 97110 THERAPEUTIC EXERCISES: CPT

## 2024-05-21 NOTE — PROGRESS NOTES
HEP/Education Completed: Plan of Care, Goals, HEP handout provided, encouraged to complete once per day in the afternoon when pain is less, compared to the morning.   [employer] Access Code: [email or web address]   []  No new Education completed  []  Reviewed Prior HEP      [x]  Patient verbalized and/or demonstrated understanding of education provided.  []  Patient unable to verbalize and/or demonstrate understanding of education provided.  Will continue education.  []  Barriers to learning: MRDD, cognition    PLAN:  Treatment Recommendations: Strengthening, Range of Motion, Balance Training, Functional Mobility Training, Gait Training, Stair Training, Neuromuscular Re-education, Manual Therapy - Soft Tissue Mobilization, Manual Therapy - Joint Manipulation, Pain Management, Home Exercise Program, Patient Education, Positioning, Aquatics, and Modalities    []  Plan of care initiated.  Plan to see patient 2 times per week for 8 weeks to address the treatment planned outlined above.  [x]  Continue with current plan of care  []  Modify plan of care as follows:    []  Hold pending physician visit  []  Discharge    Time In 1202   Time Out 1232   Timed Code Minutes: 30   Total Treatment Time: 30     Electronically Signed by: Shine Cotter PTA

## 2024-05-24 ENCOUNTER — APPOINTMENT (OUTPATIENT)
Dept: PHYSICAL THERAPY | Age: 64
End: 2024-05-24
Payer: MEDICARE

## 2024-05-28 ENCOUNTER — HOSPITAL ENCOUNTER (OUTPATIENT)
Dept: PHYSICAL THERAPY | Age: 64
Setting detail: THERAPIES SERIES
Discharge: HOME | End: 2024-05-28
Payer: MEDICARE

## 2024-05-28 PROCEDURE — 97110 THERAPEUTIC EXERCISES: CPT

## 2024-05-28 NOTE — PROGRESS NOTES
weeks    Patient will improve BLE strength to 4+/5 all directions SLR in order to rise from sitting without retro LOB and to ambulate short distances with staff supervision or SBA.     Patient will improve score of Bueno Balance Scale from baseline 14/56 to at least 27/56 in order to reduce risk of falling especially when getting up from chair or walking with staff.     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       Patient Education:   [x]  HEP/Education Completed: Plan of Care, Goals, HEP handout provided, encouraged to complete once per day in the afternoon when pain is less, compared to the morning.   [employer] Access Code: [email or web address]   []  No new Education completed  []  Reviewed Prior HEP      [x]  Patient verbalized and/or demonstrated understanding of education provided.  []  Patient unable to verbalize and/or demonstrate understanding of education provided.  Will continue education.  []  Barriers to learning: MRDD, cognition    PLAN:  Treatment Recommendations: Strengthening, Range of Motion, Balance Training, Functional Mobility Training, Gait Training, Stair Training, Neuromuscular Re-education, Manual Therapy - Soft Tissue Mobilization, Manual Therapy - Joint Manipulation, Pain Management, Home Exercise Program, Patient Education, Positioning, Aquatics, and Modalities    []  Plan of care initiated.  Plan to see patient 2 times per week for 8 weeks to address the treatment planned outlined above.  [x]  Continue with current plan of care  []  Modify plan of care as follows:    []  Hold pending physician visit  []  Discharge    Time In 1205   Time Out 1235   Timed Code Minutes: 30   Total Treatment Time: 30     Electronically Signed by: Geetha Jean, PT

## 2024-05-30 ENCOUNTER — APPOINTMENT (OUTPATIENT)
Dept: PHYSICAL THERAPY | Age: 64
End: 2024-05-30
Payer: MEDICARE

## 2024-06-26 ENCOUNTER — OFFICE VISIT (OUTPATIENT)
Dept: ALLERGY | Age: 64
End: 2024-06-26
Payer: MEDICARE

## 2024-06-26 VITALS
HEART RATE: 102 BPM | WEIGHT: 171 LBS | SYSTOLIC BLOOD PRESSURE: 132 MMHG | DIASTOLIC BLOOD PRESSURE: 60 MMHG | OXYGEN SATURATION: 93 % | HEIGHT: 66 IN | RESPIRATION RATE: 18 BRPM | BODY MASS INDEX: 27.48 KG/M2

## 2024-06-26 DIAGNOSIS — R79.89 HIGH PLASMA HISTAMINE: Primary | ICD-10-CM

## 2024-06-26 DIAGNOSIS — S50.862A INSECT BITE OF LEFT FOREARM, INITIAL ENCOUNTER: ICD-10-CM

## 2024-06-26 DIAGNOSIS — T78.1XXD POLLEN-FOOD ALLERGY, SUBSEQUENT ENCOUNTER: ICD-10-CM

## 2024-06-26 DIAGNOSIS — R76.8 ELEVATED IGE LEVEL: ICD-10-CM

## 2024-06-26 DIAGNOSIS — Z91.018 FOOD ALLERGY: ICD-10-CM

## 2024-06-26 DIAGNOSIS — D72.10 EOSINOPHILIA, UNSPECIFIED TYPE: ICD-10-CM
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
## 2024-06-26 PROCEDURE — G8428 CUR MEDS NOT DOCUMENT: HCPCS | Performed by: NURSE PRACTITIONER

## 2024-06-26 PROCEDURE — 3017F COLORECTAL CA SCREEN DOC REV: CPT | Performed by: NURSE PRACTITIONER

## 2024-06-26 PROCEDURE — 1036F TOBACCO NON-USER: CPT | Performed by: NURSE PRACTITIONER

## 2024-06-26 PROCEDURE — G8417 CALC BMI ABV UP PARAM F/U: HCPCS | Performed by: NURSE PRACTITIONER

## 2024-06-26 PROCEDURE — 99214 OFFICE O/P EST MOD 30 MIN: CPT | Performed by: NURSE PRACTITIONER

## 2024-06-26 RX ORDER — CLOPIDOGREL BISULFATE 75 MG/1
75 TABLET ORAL DAILY
COMMUNITY
Start: 2024-05-30

## 2024-06-26 RX ORDER — ATORVASTATIN CALCIUM 40 MG/1
40 TABLET, FILM COATED ORAL EVERY EVENING
COMMUNITY
Start: 2024-05-30

## 2024-06-26 NOTE — PATIENT INSTRUCTIONS
6/26/2024   ARNALDO Gutierrez CNP   [facility] [place] [facility] ALLERGY AND ASTHMA  [address] [place] [address]  Dept: [phone]  Dept Fax: [phone]  Loc: [phone]     Dear Tracie,  Thank you for your recent visit. We are committed to providing amazing patient care, and would like to make sure we were successful in providing you a great experience at our office. In the coming days, you may receive a survey via mail or email about your experience with my office. We ask that you please take a couple of minutes to complete and return it. We use our patients’ feedback to make improvements within the office that will make the experience even better for all of our patients.  We appreciate your time in helping us be the best with can!!!    Thank you, and be well!  ARNALDO Gutierrez CNP

## 2024-06-26 NOTE — PROGRESS NOTES
@[facility]@    Allergy & Asthma   [address] [place] [address]  Ph:   [phone]  Fax:[phone]    Provider:  Dr. Cheryl Skelton    Follow Up          Diagnosis Orders   1. High plasma histamine        2. Elevated IgE level        3. Food allergy        4. Eosinophilia, unspecified type        5. Pollen-food allergy, subsequent encounter        6. Insect bite of left forearm, initial encounter            CHIEF COMPLAINT:   Chief Complaint   Patient presents with    Follow-up     3 month Histamine review. Pt could not perform PFT       HISTORY OF PRESENT ILLNESS: ESTABLISHED PATIENT HERE FOR EVALUATION   63-year-old female here today with her caregiver provider for evaluation of ongoing allergic rhinitis, food allergies and other symptoms which could be contributing to pneumonia.  I have reviewed the patient's food allergies and discussed in depth with patient's provider.  Patient has several food allergies.  She loves to eat peanut every day but has a peanut related allergy.  Patient does not have an EpiPen.  Severity patient's food allergies are mild as she has never had anaphylaxis.  She does have frequent bouts of pneumonia.  However inpatient swallow study she does hold a lot of fluid and mucus and food in the back of her throat which she ends up aspirating.  Patient does not sleep in an upright position.  Onset of food allergies is new.  Patient is currently being evaluated for food allergies.  Severity of contributing to her pneumonia is moderate to severe.  She does not have any anaphylaxis.  The caregiver is very engaged in patient's medical care and wants to do everything she can to help with the patient.  The patient's caregiver does report that she has to have thickener added to her fluids that she is unable to tolerate thin fluids.  The patient does not have a mental status and capacity that allows her to make rational decision making.  Healthcare provider did not take patient to get labs

## 2024-07-01 ENCOUNTER — OFFICE VISIT (OUTPATIENT)
Dept: PULMONOLOGY | Age: 64
End: 2024-07-01
Payer: MEDICARE

## 2024-07-01 VITALS
DIASTOLIC BLOOD PRESSURE: 78 MMHG | HEIGHT: 66 IN | WEIGHT: 170 LBS | HEART RATE: 66 BPM | SYSTOLIC BLOOD PRESSURE: 118 MMHG | OXYGEN SATURATION: 92 % | TEMPERATURE: 97.7 F | BODY MASS INDEX: 27.32 KG/M2

## 2024-07-01 DIAGNOSIS — G47.33 OSA (OBSTRUCTIVE SLEEP APNEA): Primary | ICD-10-CM

## 2024-07-01 DIAGNOSIS — F31.70 BIPOLAR AFFECTIVE DISORDER IN REMISSION (HCC): ICD-10-CM

## 2024-07-01 DIAGNOSIS — F81.9 MENTAL DEVELOPMENTAL DELAY: ICD-10-CM

## 2024-07-01 PROCEDURE — G8427 DOCREV CUR MEDS BY ELIG CLIN: HCPCS | Performed by: PHYSICIAN ASSISTANT

## 2024-07-01 PROCEDURE — 99214 OFFICE O/P EST MOD 30 MIN: CPT | Performed by: PHYSICIAN ASSISTANT

## 2024-07-01 PROCEDURE — 1036F TOBACCO NON-USER: CPT | Performed by: PHYSICIAN ASSISTANT

## 2024-07-01 PROCEDURE — 3017F COLORECTAL CA SCREEN DOC REV: CPT | Performed by: PHYSICIAN ASSISTANT

## 2024-07-01 PROCEDURE — G8417 CALC BMI ABV UP PARAM F/U: HCPCS | Performed by: PHYSICIAN ASSISTANT

## 2024-07-01 ASSESSMENT — ENCOUNTER SYMPTOMS
WHEEZING: 0
SHORTNESS OF BREATH: 0
NAUSEA: 0
DIARRHEA: 0
STRIDOR: 0
ALLERGIC/IMMUNOLOGIC NEGATIVE: 1
EYES NEGATIVE: 1
BACK PAIN: 0
COUGH: 0
CHEST TIGHTNESS: 0

## 2024-07-03 PROBLEM — I35.0 MODERATE AORTIC STENOSIS: Status: ACTIVE | Noted: 2024-07-03

## 2024-07-03 NOTE — PROGRESS NOTES
[facility] [place] [facility]  [facility] CARDIOLOGY  [address]  [address]  [facility] [address]  Dept: [phone]  Dept Fax: [phone]  Loc: [phone]    Visit Date: 7/10/2024    Tracie Castellon is a 64 y.o. female who presents todayfor:  Chief Complaint   Patient presents with    6 Month Follow-Up     No cardiac complaints      Cardiologist: Jeri Pineda of developmental delay, moderate pericardial effusion, moderate AS.     HPI: 6 month f/u   Had echo 6 months ago. Mild-mod aortic stenosis.   Had a fall in February, hit her nose. Also c/o SOB. Weight is stable, has lost some actually. Not able to give reliable history. Caretaker with her today who has no signficant cardiac concerns. She does have swallowing issue currently being worked up. No obvious CHF symptoms that are worse or different.     Past Surgical History:   Procedure Laterality Date    COLONOSCOPY      COLONOSCOPY N/A 10/3/2023    COLONOSCOPY performed by Kirk Ashraf MD at [facility] ENDOSCOPY    HYSTERECTOMY (CERVIX STATUS UNKNOWN)      WI EGD TRANSORAL BIOPSY SINGLE/MULTIPLE Left 2/10/2018    EGD BIOPSY performed by Nicolas Ramirez MD at [facility] Endoscopy    UPPER GASTROINTESTINAL ENDOSCOPY  02/08/2018    [facility]     Family History   Problem Relation Age of Onset    Cancer Mother      Social History     Tobacco Use    Smoking status: Never     Passive exposure: Never    Smokeless tobacco: Never    Tobacco comments:     Never smoker   Substance Use Topics    Alcohol use: No      Current Outpatient Medications   Medication Sig Dispense Refill    Multiple Vitamin (DAILY-KYREE) TABS Take 1 tablet by mouth daily      clopidogrel (PLAVIX) 75 MG tablet Take 1 tablet by mouth daily      atorvastatin (LIPITOR) 40 MG tablet Take 1 tablet by mouth every evening      Artificial Tear Solution (GENTEAL TEARS OP) Apply to eye daily      fludrocortisone (FLORINEF) 0.1 MG tablet Take 1 tablet by mouth daily      diphenhydrAMINE

## 2024-07-05 ENCOUNTER — HOSPITAL ENCOUNTER (OUTPATIENT)
Age: 64
Discharge: HOME | End: 2024-07-05

## 2024-07-05 DIAGNOSIS — J30.89 OTHER ALLERGIC RHINITIS: ICD-10-CM

## 2024-07-05 DIAGNOSIS — R79.89 HIGH PLASMA HISTAMINE: ICD-10-CM

## 2024-07-05 DIAGNOSIS — R76.8 ELEVATED IGE LEVEL: ICD-10-CM

## 2024-07-05 DIAGNOSIS — L20.84 INTRINSIC (ALLERGIC) ECZEMA: ICD-10-CM

## 2024-07-05 DIAGNOSIS — Z91.018 FOOD ALLERGY: ICD-10-CM

## 2024-07-05 DIAGNOSIS — D72.10 EOSINOPHILIA, UNSPECIFIED TYPE: ICD-10-CM

## 2024-07-05 DIAGNOSIS — R21 RASH AND NONSPECIFIC SKIN ERUPTION: ICD-10-CM

## 2024-07-05 DIAGNOSIS — L20.84 ALLERGIC (INTRINSIC) ECZEMA: ICD-10-CM

## 2024-07-05 DIAGNOSIS — Z91.011 COW'S MILK ALLERGY: ICD-10-CM

## 2024-07-05 LAB
BASOPHILS ABSOLUTE: 0 THOU/MM3 (ref 0–0.1)
BASOPHILS NFR BLD AUTO: 0.5 %
DEPRECATED RDW RBC AUTO: 57.9 FL (ref 35–45)
EOSINOPHIL NFR BLD AUTO: 7.3 %
EOSINOPHILS ABSOLUTE: 0.4 THOU/MM3 (ref 0–0.4)
ERYTHROCYTE [DISTWIDTH] IN BLOOD BY AUTOMATED COUNT: 16.1 % (ref 11.5–14.5)
HCT VFR BLD AUTO: 40.3 % (ref 37–47)
HGB BLD-MCNC: 12 GM/DL (ref 12–16)
IMM GRANULOCYTES # BLD AUTO: 0.02 THOU/MM3 (ref 0–0.07)
IMM GRANULOCYTES NFR BLD AUTO: 0.3 %
LYMPHOCYTES ABSOLUTE: 0.8 THOU/MM3 (ref 1–4.8)
LYMPHOCYTES NFR BLD AUTO: 14.8 %
MCH RBC QN AUTO: 29.2 PG (ref 26–33)
MCHC RBC AUTO-ENTMCNC: 29.8 GM/DL (ref 32.2–35.5)
MCV RBC AUTO: 98.1 FL (ref 81–99)
MISC REFERENCE: NORMAL
MONOCYTES ABSOLUTE: 0.3 THOU/MM3 (ref 0.4–1.3)
MONOCYTES NFR BLD AUTO: 5.2 %
NEUTROPHILS ABSOLUTE: 4.1 THOU/MM3 (ref 1.8–7.7)
NEUTROPHILS NFR BLD AUTO: 71.9 %
NRBC BLD AUTO-RTO: 0 /100 WBC
PLATELET # BLD AUTO: 186 THOU/MM3 (ref 130–400)
PMV BLD AUTO: 9.7 FL (ref 9.4–12.4)
RBC # BLD AUTO: 4.11 MILL/MM3 (ref 4.2–5.4)
WBC # BLD AUTO: 5.7 THOU/MM3 (ref 4.8–10.8)

## 2024-07-06 LAB — IGE SERPL-ACNC: ABNORMAL IU/ML (ref 0–100)

## 2024-07-07 LAB
DEPRECATED MISC ALLERGEN IGE RAST QL: NORMAL
LTX IGE QN: NORMAL
MISC #3 REFERENCE REPORT: ABNORMAL
MISC #3 REFERENCE REPORT: NORMAL
MISC REFERENCE: NORMAL
MISC. #1 REFERENCE GROUP TEST: ABNORMAL
MISC. #1 REFERENCE GROUP TEST: NORMAL
MISC. #1 REFERENCE GROUP TEST: NORMAL
MISC. #2 REFERENCE REPORT: NORMAL
MISC. #2 REFERENCE REPORT: NORMAL

## 2024-07-08 ENCOUNTER — OFFICE VISIT (OUTPATIENT)
Dept: ALLERGY | Age: 64
End: 2024-07-08
Payer: MEDICARE

## 2024-07-08 VITALS
WEIGHT: 174.6 LBS | OXYGEN SATURATION: 98 % | SYSTOLIC BLOOD PRESSURE: 121 MMHG | HEART RATE: 82 BPM | BODY MASS INDEX: 28.06 KG/M2 | RESPIRATION RATE: 18 BRPM | DIASTOLIC BLOOD PRESSURE: 75 MMHG | HEIGHT: 66 IN

## 2024-07-08 DIAGNOSIS — R76.8 ELEVATED IGE LEVEL: ICD-10-CM

## 2024-07-08 DIAGNOSIS — I63.9 CEREBROVASCULAR ACCIDENT (CVA), UNSPECIFIED MECHANISM (HCC): Primary | ICD-10-CM

## 2024-07-08 PROCEDURE — 1090F PRES/ABSN URINE INCON ASSESS: CPT | Performed by: NURSE PRACTITIONER

## 2024-07-08 PROCEDURE — 3017F COLORECTAL CA SCREEN DOC REV: CPT | Performed by: NURSE PRACTITIONER

## 2024-07-08 PROCEDURE — 1123F ACP DISCUSS/DSCN MKR DOCD: CPT | Performed by: NURSE PRACTITIONER

## 2024-07-08 PROCEDURE — 1036F TOBACCO NON-USER: CPT | Performed by: NURSE PRACTITIONER

## 2024-07-08 PROCEDURE — G8427 DOCREV CUR MEDS BY ELIG CLIN: HCPCS | Performed by: NURSE PRACTITIONER

## 2024-07-08 PROCEDURE — G8400 PT W/DXA NO RESULTS DOC: HCPCS | Performed by: NURSE PRACTITIONER

## 2024-07-08 PROCEDURE — G8417 CALC BMI ABV UP PARAM F/U: HCPCS | Performed by: NURSE PRACTITIONER

## 2024-07-08 PROCEDURE — 99214 OFFICE O/P EST MOD 30 MIN: CPT | Performed by: NURSE PRACTITIONER

## 2024-07-08 RX ORDER — MULTIVITAMIN WITH FOLIC ACID 400 MCG
1 TABLET ORAL DAILY
COMMUNITY
Start: 2024-07-01

## 2024-07-08 NOTE — PROGRESS NOTES
@[facility]    Allergy & Asthma   [address] [place] [address]  Ph:   [phone]  Fax:[phone]    Provider:  Dr. Cheryl Skelton    Follow Up          Diagnosis Orders   1. Cerebrovascular accident (CVA), unspecified mechanism (HCC)  CBC with Auto Differential    IgE      2. Elevated IgE level  Strongyloides Ab, IgG    CBC with Auto Differential    IgE          CHIEF COMPLAINT:   Chief Complaint   Patient presents with    Follow-up     Return in about 2 weeks (around 7/10/2024) for Lab review. ok per cc       HISTORY OF PRESENT ILLNESS: ESTABLISHED PATIENT HERE FOR EVALUATION   64-year-old  female here today to follow-up.  Aide is with her.  Patient has difficulty comprehending and understanding.  They reports she also recently had a stroke.  She was seen and treated at [facility] and they are going back to the neurologist tomorrow.  The assistant reports that patient eats a lot of fruit bars.  She reports that they have made some dietary changes and as a result patient is lost 20 pounds.  Patient does not have any rashes nausea vomiting or diarrhea.  She did recently get her labs done however the labs were only done 2 days ago and therefore not all the labs are back.  She came in today to go ahead and review the labs to see how they are.  Severity of patient's food allergies as far as being and symptomatic are mild.  However the patient's IgE level came back it is 12,888 which is significant increase from 6000 back in August.  Patient has been eating a lot more of the [employer] bakery bars that contain high amount of weight.  They reports that she will eat as many as she can get her hands a hold of and often 4-5 a day.  Patient's lab has been intermittently coming back.  And therefore we cannot review all of her labs today         Review of Systems:  Review of Systems   Allergic/Immunologic: Positive for food allergies and immunocompromised state.   Psychiatric/Behavioral:  Positive for

## 2024-07-08 NOTE — PATIENT INSTRUCTIONS
7/8/2024   ARNALDO Gutierrez CNP   [facility] [place] [facility] ALLERGY AND ASTHMA  [address] [place] [address]  Dept: [phone]  Dept Fax: [phone]  Loc: [phone]     Dear Tracie,  Thank you for your recent visit. We are committed to providing amazing patient care, and would like to make sure we were successful in providing you a great experience at our office. In the coming days, you may receive a survey via mail or email about your experience with my office. We ask that you please take a couple of minutes to complete and return it. We use our patients’ feedback to make improvements within the office that will make the experience even better for all of our patients.  We appreciate your time in helping us be the best with can!!!    Thank you, and be well!  ARNALDO Gutierrez CNP

## 2024-07-09 ENCOUNTER — TELEPHONE (OUTPATIENT)
Dept: ALLERGY | Age: 64
End: 2024-07-09

## 2024-07-09 LAB
ALLERGEN ALMOND IGE: 0.15 KU/L (ref 0–0.34)
ALLERGEN BANANA: 0.71 KU/L (ref 0–0.34)
ALLERGEN CASHEW IGE: 0.27 KU/L (ref 0–0.34)
ALLERGEN CHICKEN IGE: 0.41 KU/L (ref 0–0.34)
ALLERGEN CLAMS IGE: 0.2 KU/L (ref 0–0.34)
ALLERGEN CODFISH IGE: 0.11 KU/L (ref 0–0.34)
ALLERGEN CRAB IGE: 0.16 KU/L (ref 0–0.34)
ALLERGEN EGG WHITE IGE: 1.14 KU/L (ref 0–0.34)
ALLERGEN HAZELNUT: 0.14 KU/L (ref 0–0.34)
ALLERGEN LOBSTER IGE: 0.24 KU/L (ref 0–0.34)
ALLERGEN PEANUT (F13) IGE: 0.27 KU/L (ref 0–0.34)
ALLERGEN PECAN NUT IGE: 0.32 KU/L (ref 0–0.34)
ALLERGEN SCALLOP IGE: 0.21 KU/L (ref 0–0.34)
ALLERGEN TUNA IGE: 0.13 KU/L (ref 0–0.34)
ALLERGEN WALNUT IGE: 0.17 KU/L (ref 0–0.34)
BRAZIL NUT IGE CLASS: < 0.1 KU/L (ref 0–0.34)
CHESTNUT IGE CLASS: 0.43 KU/L (ref 0–0.34)
EGG YOLK IGE: 0.27 KU/L (ref 0–0.34)
HISTAMINE BLD-SCNC: NORMAL NMOL/L
SHRIMP: 0.83 KU/L (ref 0–0.34)

## 2024-07-10 ENCOUNTER — TELEPHONE (OUTPATIENT)
Dept: ALLERGY | Age: 64
End: 2024-07-10

## 2024-07-10 ENCOUNTER — OFFICE VISIT (OUTPATIENT)
Dept: CARDIOLOGY CLINIC | Age: 64
End: 2024-07-10
Payer: MEDICARE

## 2024-07-10 VITALS
HEIGHT: 66 IN | HEART RATE: 72 BPM | SYSTOLIC BLOOD PRESSURE: 126 MMHG | BODY MASS INDEX: 29.09 KG/M2 | WEIGHT: 181 LBS | DIASTOLIC BLOOD PRESSURE: 76 MMHG

## 2024-07-10 DIAGNOSIS — I31.39 PERICARDIAL EFFUSION: Primary | ICD-10-CM

## 2024-07-10 DIAGNOSIS — I35.0 MODERATE AORTIC STENOSIS: ICD-10-CM

## 2024-07-10 PROCEDURE — G8400 PT W/DXA NO RESULTS DOC: HCPCS | Performed by: STUDENT IN AN ORGANIZED HEALTH CARE EDUCATION/TRAINING PROGRAM

## 2024-07-10 PROCEDURE — 1036F TOBACCO NON-USER: CPT | Performed by: STUDENT IN AN ORGANIZED HEALTH CARE EDUCATION/TRAINING PROGRAM

## 2024-07-10 PROCEDURE — G8417 CALC BMI ABV UP PARAM F/U: HCPCS | Performed by: STUDENT IN AN ORGANIZED HEALTH CARE EDUCATION/TRAINING PROGRAM

## 2024-07-10 PROCEDURE — 99214 OFFICE O/P EST MOD 30 MIN: CPT | Performed by: STUDENT IN AN ORGANIZED HEALTH CARE EDUCATION/TRAINING PROGRAM

## 2024-07-10 PROCEDURE — 1123F ACP DISCUSS/DSCN MKR DOCD: CPT | Performed by: STUDENT IN AN ORGANIZED HEALTH CARE EDUCATION/TRAINING PROGRAM

## 2024-07-10 PROCEDURE — 3017F COLORECTAL CA SCREEN DOC REV: CPT | Performed by: STUDENT IN AN ORGANIZED HEALTH CARE EDUCATION/TRAINING PROGRAM

## 2024-07-10 PROCEDURE — 1090F PRES/ABSN URINE INCON ASSESS: CPT | Performed by: STUDENT IN AN ORGANIZED HEALTH CARE EDUCATION/TRAINING PROGRAM

## 2024-07-10 PROCEDURE — G8427 DOCREV CUR MEDS BY ELIG CLIN: HCPCS | Performed by: STUDENT IN AN ORGANIZED HEALTH CARE EDUCATION/TRAINING PROGRAM

## 2024-07-10 NOTE — TELEPHONE ENCOUNTER
Tracie's caregiver (Sailaja Stockton) returned call and was informed that Tracie is allergic to bananas

## 2024-07-10 NOTE — TELEPHONE ENCOUNTER
Pt's legal guardian Hilaria Stockton called back and informed on lab results.  Pt's legal guardian verbalized understanding and thanked me.

## 2024-07-22 ENCOUNTER — HOSPITAL ENCOUNTER (EMERGENCY)
Age: 64
Discharge: HOME | End: 2024-07-22
Payer: MEDICARE

## 2024-07-22 VITALS
DIASTOLIC BLOOD PRESSURE: 84 MMHG | SYSTOLIC BLOOD PRESSURE: 142 MMHG | HEART RATE: 97 BPM | RESPIRATION RATE: 18 BRPM | TEMPERATURE: 97.3 F | OXYGEN SATURATION: 98 %

## 2024-07-22 DIAGNOSIS — H10.9 BACTERIAL CONJUNCTIVITIS OF LEFT EYE: Primary | ICD-10-CM

## 2024-07-22 PROCEDURE — 99213 OFFICE O/P EST LOW 20 MIN: CPT

## 2024-07-22 RX ORDER — POLYMYXIN B SULFATE AND TRIMETHOPRIM 1; 10000 MG/ML; [USP'U]/ML
1 SOLUTION OPHTHALMIC 4 TIMES DAILY
Qty: 10 ML | Refills: 0 | Status: SHIPPED | OUTPATIENT
Start: 2024-07-22 | End: 2024-08-01

## 2024-07-22 ASSESSMENT — ENCOUNTER SYMPTOMS
EYE DISCHARGE: 1
RHINORRHEA: 0
VOMITING: 0
SHORTNESS OF BREATH: 0
TROUBLE SWALLOWING: 0
DIARRHEA: 0
SORE THROAT: 0
COUGH: 0
NAUSEA: 0
EYE REDNESS: 1

## 2024-07-22 NOTE — DISCHARGE INSTRUCTIONS
Prescribed Polytrim eyedrops.  Place 1 drop into left eye 4 times a day for 7 full days.    Good hand hygiene.    Wash drainage away with warm wet wash rag.    Follow-up primary care provider in 3 to 5 days if symptoms worsen or fail to improve.

## 2024-07-22 NOTE — ED NOTES
Pt with complaints of left eye drainage that started on Saturday. Denies any vision changes.      Viv Mora LPN  07/22/24 9612

## 2024-07-22 NOTE — PROGRESS NOTES
Waiting on results of sweet chestnuts, sesame seed, wheat, IgE, CBC.  These will be repeated in the near future

## 2024-07-29 ENCOUNTER — HOSPITAL ENCOUNTER (EMERGENCY)
Age: 64
Discharge: HOME | End: 2024-07-29
Payer: MEDICARE

## 2024-07-29 VITALS
HEIGHT: 66 IN | BODY MASS INDEX: 29.21 KG/M2 | OXYGEN SATURATION: 96 % | SYSTOLIC BLOOD PRESSURE: 103 MMHG | DIASTOLIC BLOOD PRESSURE: 56 MMHG | TEMPERATURE: 98.3 F | HEART RATE: 90 BPM | RESPIRATION RATE: 18 BRPM

## 2024-07-29 DIAGNOSIS — T14.8XXA OPEN WOUND: Primary | ICD-10-CM

## 2024-07-29 PROCEDURE — 99213 OFFICE O/P EST LOW 20 MIN: CPT

## 2024-07-29 PROCEDURE — 6370000000 HC RX 637 (ALT 250 FOR IP)

## 2024-07-29 PROCEDURE — 87186 SC STD MICRODIL/AGAR DIL: CPT

## 2024-07-29 PROCEDURE — 87070 CULTURE OTHR SPECIMN AEROBIC: CPT

## 2024-07-29 PROCEDURE — 87077 CULTURE AEROBIC IDENTIFY: CPT

## 2024-07-29 PROCEDURE — 87205 SMEAR GRAM STAIN: CPT

## 2024-07-29 RX ORDER — GINSENG 100 MG
CAPSULE ORAL ONCE
Status: COMPLETED | OUTPATIENT
Start: 2024-07-29 | End: 2024-07-29

## 2024-07-29 RX ORDER — CEPHALEXIN 500 MG/1
500 CAPSULE ORAL 3 TIMES DAILY
Qty: 21 CAPSULE | Refills: 0 | Status: SHIPPED | OUTPATIENT
Start: 2024-07-29 | End: 2024-08-05

## 2024-07-29 RX ADMIN — BACITRACIN: 500 OINTMENT TOPICAL at 17:49

## 2024-07-29 ASSESSMENT — PAIN DESCRIPTION - DESCRIPTORS: DESCRIPTORS: SORE

## 2024-07-29 ASSESSMENT — PAIN SCALES - WONG BAKER: WONGBAKER_NUMERICALRESPONSE: HURTS WHOLE LOT

## 2024-07-29 ASSESSMENT — PAIN - FUNCTIONAL ASSESSMENT: PAIN_FUNCTIONAL_ASSESSMENT: WONG-BAKER FACES

## 2024-07-29 ASSESSMENT — ENCOUNTER SYMPTOMS: COUGH: 0

## 2024-07-29 ASSESSMENT — PAIN DESCRIPTION - ORIENTATION: ORIENTATION: LEFT

## 2024-07-29 ASSESSMENT — PAIN DESCRIPTION - LOCATION: LOCATION: BUTTOCKS

## 2024-07-29 NOTE — ED PROVIDER NOTES
[facility] URGENT CARE  Urgent Care Encounter      CHIEF COMPLAINT       Chief Complaint   Patient presents with    Wound Check     Left upper leg/buttock       Nurses Notes reviewed and I agree except as noted in the HPI.  HISTORY OF PRESENT ILLNESS   Tracie Castellon is a 64 y.o. female who presents To urgent care with caregiver complaining of wound to left upper leg/left lower buttocks. Reports they first noticed the sore today when patient initially started complaining. Caregiver reports patient does sleep on that side so unsure if it is more of a pressure wound or if she was picking at area. Caregiver denies fevers.     REVIEW OF SYSTEMS     Review of Systems   Constitutional:  Negative for fatigue and fever.   Respiratory:  Negative for cough.    Cardiovascular:  Negative for chest pain.   Skin:  Positive for wound.   Neurological:  Negative for dizziness, seizures and numbness.       PAST MEDICAL HISTORY         Diagnosis Date    Arthritis     Bipolar disorder ([facility])     CHF (congestive heart failure) ([facility])     Chronic kidney disease     Coarse tremors     Constipation     COPD (chronic obstructive pulmonary disease) ([facility])     Depression     Gait difficulty     General weakness     GERD (gastroesophageal reflux disease)     Hypertension     Mental retardation     Pneumonia     Seizures (HCC)     Thyroid disease     UTI (lower urinary tract infection)        SURGICAL HISTORY     Patient  has a past surgical history that includes Hysterectomy; pr egd transoral biopsy single/multiple (Left, 2/10/2018); Upper gastrointestinal endoscopy (02/08/2018); Colonoscopy; and Colonoscopy (N/A, 10/3/2023).    CURRENT MEDICATIONS       Discharge Medication List as of 7/29/2024  5:42 PM        CONTINUE these medications which have NOT CHANGED    Details   trimethoprim-polymyxin b (POLYTRIM) 85302-8.1 UNIT/ML-% ophthalmic solution Place 1 drop into the left eye 4 times daily for 10 days, Disp-10 mL, R-0Normal

## 2024-07-29 NOTE — DISCHARGE INSTRUCTIONS
Follow up with PCP in 3-5 days    If patient develops worsening symptoms such as fevers, purulent drainage, or increased pain - go directly to the emergency room.     Change dressing once daily.

## 2024-08-02 LAB
BACTERIA SPEC AEROBE CULT: ABNORMAL
GRAM STN SPEC: ABNORMAL
ORGANISM: ABNORMAL
ORGANISM: ABNORMAL

## 2024-08-05 DIAGNOSIS — R79.89 HIGH PLASMA HISTAMINE: ICD-10-CM

## 2024-08-05 DIAGNOSIS — Z91.018 FOOD ALLERGY: ICD-10-CM

## 2024-08-05 RX ORDER — LORATADINE 10 MG/1
10 TABLET ORAL NIGHTLY
Qty: 90 TABLET | Refills: 1 | Status: SHIPPED | OUTPATIENT
Start: 2024-08-05

## 2024-08-05 RX ORDER — FAMOTIDINE 20 MG/1
20 TABLET, FILM COATED ORAL DAILY
Qty: 90 TABLET | Refills: 1 | Status: SHIPPED | OUTPATIENT
Start: 2024-08-05

## 2024-08-05 NOTE — TELEPHONE ENCOUNTER
REMIND PATIENTS TO REQUESTS MEDICATIONS DURING THEIR REGULAR OFFICE VISIT EVEN IF THE MEDICATION IS NOT DUE.  THIS SAVES TIME FOR THE PATIENT AND PROVIDER.      IF MEDICATIONS ARE NOT DUE AT THAT TIME, THE PHARMACY MAY HOLD THE PRESCRIPTION TO FILL AT A LATER DATE.    MAKE SURE THE CORRECT PHARMACY IS SELECTED WITH THE MEDICATION REFILL REQUESTS.  MEDICATIONS THAT ARE SENT TO A DIFFERENT PHARMACY WILL NEED TO BE TRANSFERRED BY THE PATIENT TO THE CORRECT PHARMACY.  PLEASE VERIFY THE CORRECT PHARMACY AND LINK THE PHARMACY TO THE REFILL REQUEST.    ____________________________________________________________________________________________________________________________________    Pharmacy requests the following medication to be refilled:    How often does patient take medication? DAILY    Patient is requesting 30 days of medication      Pharmacy (need the exact  pharmacy):     Patient last received medication on date: 3/25/24   Number of refills given at last fill: 1  (If refills are current the patient does not need another refill)    Last appointment: 7/8/24  IF THE PATIENT HAS NOT BEEN SEEN DURING THE LAST YEAR, THE MUST HAVE AN APPT TO BE SEEN AND I WILL ONLD SEND IN 90 DAYS ONE TIME.    Next appt : 11/4/24    DID THE PATIENT MISS THE LAST APPT AS A NO SHOW?  no.  IF THE PATIENT WAS A NO SHOW I WILL NOT FILL THE MEDICATION.

## 2024-08-14 ENCOUNTER — HOSPITAL ENCOUNTER (OUTPATIENT)
Dept: SLEEP CENTER | Age: 64
Discharge: HOME | End: 2024-08-16
Payer: MEDICARE

## 2024-08-14 DIAGNOSIS — G47.33 OSA (OBSTRUCTIVE SLEEP APNEA): ICD-10-CM

## 2024-08-14 PROCEDURE — 95810 POLYSOM 6/> YRS 4/> PARAM: CPT

## 2024-08-21 ENCOUNTER — HOSPITAL ENCOUNTER (OUTPATIENT)
Dept: CT IMAGING | Age: 64
Discharge: HOME | End: 2024-08-21
Attending: SPECIALIST
Payer: MEDICARE

## 2024-08-21 DIAGNOSIS — R91.8 LUNG INFILTRATE ON CT: ICD-10-CM

## 2024-08-21 DIAGNOSIS — U09.9 POST-COVID SYNDROME: ICD-10-CM

## 2024-08-21 DIAGNOSIS — T17.908S ASPIRATION INTO AIRWAY, SEQUELA: ICD-10-CM

## 2024-08-21 PROCEDURE — 71250 CT THORAX DX C-: CPT

## 2024-08-27 ENCOUNTER — OFFICE VISIT (OUTPATIENT)
Dept: PULMONOLOGY | Age: 64
End: 2024-08-27
Payer: MEDICARE

## 2024-08-27 VITALS
HEIGHT: 66 IN | SYSTOLIC BLOOD PRESSURE: 120 MMHG | BODY MASS INDEX: 29.96 KG/M2 | DIASTOLIC BLOOD PRESSURE: 68 MMHG | WEIGHT: 186.4 LBS | OXYGEN SATURATION: 96 % | HEART RATE: 76 BPM | TEMPERATURE: 98.6 F

## 2024-08-27 DIAGNOSIS — F81.9 MENTAL DEVELOPMENTAL DELAY: ICD-10-CM

## 2024-08-27 DIAGNOSIS — G47.10 HYPERSOMNIA: ICD-10-CM

## 2024-08-27 DIAGNOSIS — F31.70 BIPOLAR AFFECTIVE DISORDER IN REMISSION (HCC): ICD-10-CM

## 2024-08-27 DIAGNOSIS — G47.33 OSA (OBSTRUCTIVE SLEEP APNEA): Primary | ICD-10-CM

## 2024-08-27 PROCEDURE — 3017F COLORECTAL CA SCREEN DOC REV: CPT | Performed by: PHYSICIAN ASSISTANT

## 2024-08-27 PROCEDURE — 1036F TOBACCO NON-USER: CPT | Performed by: PHYSICIAN ASSISTANT

## 2024-08-27 PROCEDURE — G8427 DOCREV CUR MEDS BY ELIG CLIN: HCPCS | Performed by: PHYSICIAN ASSISTANT

## 2024-08-27 PROCEDURE — 1090F PRES/ABSN URINE INCON ASSESS: CPT | Performed by: PHYSICIAN ASSISTANT

## 2024-08-27 PROCEDURE — 1123F ACP DISCUSS/DSCN MKR DOCD: CPT | Performed by: PHYSICIAN ASSISTANT

## 2024-08-27 PROCEDURE — G8417 CALC BMI ABV UP PARAM F/U: HCPCS | Performed by: PHYSICIAN ASSISTANT

## 2024-08-27 PROCEDURE — G8400 PT W/DXA NO RESULTS DOC: HCPCS | Performed by: PHYSICIAN ASSISTANT

## 2024-08-27 PROCEDURE — 99214 OFFICE O/P EST MOD 30 MIN: CPT | Performed by: PHYSICIAN ASSISTANT

## 2024-08-27 ASSESSMENT — ENCOUNTER SYMPTOMS
WHEEZING: 0
DIARRHEA: 0
ALLERGIC/IMMUNOLOGIC NEGATIVE: 1
BACK PAIN: 0
SHORTNESS OF BREATH: 0
STRIDOR: 0
NAUSEA: 0
COUGH: 0
EYES NEGATIVE: 1
CHEST TIGHTNESS: 0

## 2024-08-27 NOTE — PROGRESS NOTES
[facility]    Tracie Castellon, 64 y.o.  [id number]      Pt of Dr. Israel  Nurses Notes   PSG F/U    ESS: Unable to obtain secondary to patient's mental status  SAQLI:  Study Results  Initial Study Date -  08/14/2024  AHI -  13.3    TotalEvents - 66  (Apneas  0  Hypopneas 66  Central  0)  LM w/Arousals - 0  Sleep Efficiency -  82.9% (Total Sleep Time - 297 min)  Time with Sats below 88% - 45.7 min  Interval History       Tracie Castellon is a 64 y.o. old female who comes in to review the results of her recent sleep study, to answer questions and to explore options for treatment.  Patient has been refusing to wear CPAP for the last several months.  She underwent a repeat sleep study as she had lost about 20 pound since her previous sleep study to see whether not she still required PAP therapy.  She is tired during the day.  She sleeps well at night according to staff members.  Allergies  Allergies   Allergen Reactions    Ibuprofen Other (See Comments)    Banana     Carrot      Lab test    Cow's Milk [Milk (Cow)]      Lab test-moderate range    Egg White (Diagnostic)      Lab test    Nsaids      Interacts with psych meds    Other      See test from 03/15/24 scanned in labs from path [employer]. Person that scanned put labs for food allergies titled as a CBC    Peanut (Diagnostic)      Lab test    Shrimp (Diagnostic)     Soybean-Containing Drug Products      Lab test     Meds  Current Outpatient Medications   Medication Sig Dispense Refill    famotidine (PEPCID) 20 MG tablet Take 1 tablet by mouth daily May take an additional pepcid 20 mg with anaphylaxis 90 tablet 1    loratadine (CLARITIN) 10 MG tablet Take 1 tablet by mouth at bedtime May take one additional 10 mg loratadine with anaphylaxis 90 tablet 1    Multiple Vitamin (DAILY-KYREE) TABS Take 1 tablet by mouth daily      clopidogrel (PLAVIX) 75 MG tablet Take 1 tablet by mouth daily      atorvastatin (LIPITOR) 40 MG    Cardiovascular:  Negative for chest pain and leg swelling.   Gastrointestinal:  Negative for diarrhea and nausea.   Endocrine: Negative.    Genitourinary: Negative.    Musculoskeletal: Negative.  Negative for arthralgias and back pain.   Skin: Negative.    Allergic/Immunologic: Negative.    Neurological: Negative.  Negative for dizziness and light-headedness.   Psychiatric/Behavioral: Negative.     All other systems reviewed and are negative.         Exam  Vitals -  /68 (Site: Left Upper Arm, Position: Sitting, Cuff Size: Large Adult)   Pulse 76   Temp 98.6 °F (37 °C) (Infrared)   Ht 1.676 m (5' 6\")   Wt 84.6 kg (186 lb 6.4 oz)   SpO2 96%   BMI 30.09 kg/m²    Body mass index is 30.09 kg/m².  Oxygen level - Room Air  Physical Exam  Constitutional:       General: She is not in acute distress.     Appearance: Normal appearance. She is normal weight. She is not ill-appearing.   HENT:      Head: Normocephalic and atraumatic.      Nose: Nose normal.   Eyes:      Extraocular Movements: Extraocular movements intact.      Pupils: Pupils are equal, round, and reactive to light.   Pulmonary:      Effort: Pulmonary effort is normal.   Neurological:      Mental Status: She is alert and oriented to person, place, and time.   Psychiatric:         Attention and Perception: Attention and perception normal.         Mood and Affect: Mood and affect normal.         Speech: Speech normal.         Behavior: Behavior normal.         Thought Content: Thought content normal.         Cognition and Memory: Cognition and memory normal.         Judgment: Judgment normal.        Assessment   Diagnosis Orders   1. ISIDORO (obstructive sleep apnea)        2. Bipolar affective disorder in remission (HCC)        3. Mental developmental delay        4. Hypersomnia           Recommendations  PSG reviewed with her and her staff member.  As compared to previous sleep study.  She has mild obstructive sleep apnea with some desaturations.  She is

## 2024-09-06 NOTE — DISCHARGE SUMMARY
[facility]  PHYSICAL THERAPY OUTPATIENT QUICK DISCHARGE NOTE  [facility]    Date: 2024  Patient Name:  Tracie Castellon  : 1960  MRN: [id number]  CSN: [id number]    Referring Practitioner Jeff Naylor MD [id number]      Diagnosis Pain in left hip     Patient is discharged from Physical Therapy services at this time.  See last note for details related to results of therapy and goal achievement.    Reason for discharge: Was on hold for physician appointment.  No additional therapy required at this time.. Patient participated in therapy for the left hip from May 8 through May 28. Her staff cancelled therapy on 24 - due to hospital admission for concern for stroke, confusion, and finding left rib fractures. Never called back to resume therapy, discharged from PT.     Geetha Jean, PT

## 2024-09-17 NOTE — ED PROVIDER NOTES
Cardiology Cardiology Cardiology Cardiology Heart Failure bacterial conjunctivitis of left eye. Prescribed Polytrim eyedrops.  Instructed to place 1 drop into left eye 4 times a day for 7 full days. Discussed the importance of good hand hygiene. Wash drainage away with warm wet wash rag. Follow-up primary care provider in 3 to 5 days if symptoms worsen or fail to improve.  Caregiver verbalized and agreed to plan of care.    PATIENT REFERRED TO:  Monica Peck APRN - CNP  [address] [address]  [phone]      As needed, If symptoms worsen    DISCHARGE MEDICATIONS:  Discharge Medication List as of 7/22/2024  4:51 PM        START taking these medications    Details   trimethoprim-polymyxin b (POLYTRIM) 69477-5.1 UNIT/ML-% ophthalmic solution Place 1 drop into the left eye 4 times daily for 10 days, Disp-10 mL, R-0Normal           Discharge Medication List as of 7/22/2024  4:51 PM          ARNALDO Fernandez CNP, Olivia, APRN - CNP  07/22/24 1947

## 2024-10-14 NOTE — PROGRESS NOTES
[facility]    Patient: KATIE GALDAMEZ, 64 y.o.   : 1960  10/15/2024    Patient of Dr. Leal     Assessment/Plan   1. Shortness of breath on exertion  -Breathing symptoms stable at this time off of inhaled therapy. Patient unfortunately not able to follow respiratory maneuvers to obtain accurate pulmonary function test results. See documentation from RT 2023. Patient and caregiver instructed to call if patient has worsening in shortness of breath     2. Abnormal CT of the chest with History of aspiration pneumonia and Personal history of COVID-19  -Reviewed stable HRCT with patient and caregiver. Advised patient to strictly follow her modified diet to prevent further aspiration episodes   -Will follow with CT Chest in 1 year. Patient/caregiver to call for earlier appointment if patient has increase in respiratory symptoms  -Reviewed preventative vaccinations    3. Elevated left hemidiaphragm  -Advised patient and caregiver of elevated hemidiaphragm on CT Chest that is likely contributing to the atelectasis/consolidation in her LLL. Consider SNIFF test to evaluate diaphragm motility however I fear patient will not be able to follow commands to obtain accurate results. Patient is mildly symptomatic at this time and would thus likely not be a candidate for diaphragm plication. Will follow imaging and symptoms and consider SNIFF/referral to tertiary center to consider diaphragm plication if symptoms/imaging worsen.    Advised patient to call office with any changes, questions, or concerns regarding respiratory status or issues with prescribed medications    Return in about 10 months (around 8/15/2025) for elevated diaphragm & hx of aspiration pneumonia with CT Chest prior.        Subjective     Chief Complaint   Patient presents with    Follow-up     8 month Asthma follow up   CTHR 24        HPI  Complaints: Shortness of Breath \"some\"  Onset Duration: Over

## 2024-10-15 ENCOUNTER — OFFICE VISIT (OUTPATIENT)
Dept: PULMONOLOGY | Age: 64
End: 2024-10-15
Payer: MEDICARE

## 2024-10-15 VITALS
BODY MASS INDEX: 30.37 KG/M2 | SYSTOLIC BLOOD PRESSURE: 126 MMHG | WEIGHT: 189 LBS | TEMPERATURE: 97.2 F | HEART RATE: 78 BPM | DIASTOLIC BLOOD PRESSURE: 72 MMHG | OXYGEN SATURATION: 97 % | HEIGHT: 66 IN

## 2024-10-15 DIAGNOSIS — R06.02 SHORTNESS OF BREATH ON EXERTION: Primary | ICD-10-CM

## 2024-10-15 DIAGNOSIS — Z87.01 HISTORY OF ASPIRATION PNEUMONIA: ICD-10-CM

## 2024-10-15 DIAGNOSIS — J98.6 ELEVATED HEMIDIAPHRAGM: ICD-10-CM

## 2024-10-15 DIAGNOSIS — R93.89 ABNORMAL CT OF THE CHEST: ICD-10-CM

## 2024-10-15 DIAGNOSIS — Z86.16 PERSONAL HISTORY OF COVID-19: ICD-10-CM

## 2024-10-15 PROCEDURE — 1123F ACP DISCUSS/DSCN MKR DOCD: CPT

## 2024-10-15 PROCEDURE — 99213 OFFICE O/P EST LOW 20 MIN: CPT

## 2024-10-15 PROCEDURE — 3017F COLORECTAL CA SCREEN DOC REV: CPT

## 2024-10-15 PROCEDURE — G8427 DOCREV CUR MEDS BY ELIG CLIN: HCPCS

## 2024-10-15 PROCEDURE — 1036F TOBACCO NON-USER: CPT

## 2024-10-15 PROCEDURE — G8400 PT W/DXA NO RESULTS DOC: HCPCS

## 2024-10-15 PROCEDURE — G8417 CALC BMI ABV UP PARAM F/U: HCPCS

## 2024-10-15 PROCEDURE — 1090F PRES/ABSN URINE INCON ASSESS: CPT

## 2024-10-15 PROCEDURE — G8484 FLU IMMUNIZE NO ADMIN: HCPCS

## 2024-10-15 RX ORDER — MELATONIN 10 MG
CAPSULE ORAL
COMMUNITY
Start: 2024-07-22

## 2024-10-15 ASSESSMENT — ENCOUNTER SYMPTOMS
CHEST TIGHTNESS: 0
SHORTNESS OF BREATH: 1
SINUS PRESSURE: 0
SINUS PAIN: 0
WHEEZING: 0
RHINORRHEA: 0
COUGH: 1

## 2024-10-16 DIAGNOSIS — Z91.018 FOOD ALLERGY: ICD-10-CM

## 2024-10-16 DIAGNOSIS — R79.89 HIGH PLASMA HISTAMINE: ICD-10-CM

## 2024-10-17 RX ORDER — LORATADINE 10 MG/1
TABLET ORAL
Qty: 31 TABLET | Refills: 11 | OUTPATIENT
Start: 2024-10-17

## 2024-10-17 RX ORDER — FAMOTIDINE 20 MG/1
TABLET, FILM COATED ORAL
Qty: 31 TABLET | Refills: 11 | OUTPATIENT
Start: 2024-10-17

## 2024-10-17 RX ORDER — EPINEPHRINE 0.3 MG/.3ML
INJECTION SUBCUTANEOUS
Qty: 2 EACH | Refills: 11 | OUTPATIENT
Start: 2024-10-17

## 2024-10-22 DIAGNOSIS — R79.89 HIGH PLASMA HISTAMINE: ICD-10-CM

## 2024-10-22 DIAGNOSIS — Z91.018 FOOD ALLERGY: ICD-10-CM

## 2024-10-22 RX ORDER — EPINEPHRINE 0.3 MG/.3ML
INJECTION SUBCUTANEOUS
Qty: 2 EACH | Refills: 11 | Status: SHIPPED | OUTPATIENT
Start: 2024-10-22

## 2024-10-22 RX ORDER — FAMOTIDINE 20 MG/1
TABLET, FILM COATED ORAL
Qty: 31 TABLET | Refills: 11 | Status: SHIPPED | OUTPATIENT
Start: 2024-10-22

## 2024-10-22 RX ORDER — LORATADINE 10 MG/1
TABLET ORAL
Qty: 31 TABLET | Refills: 11 | Status: SHIPPED | OUTPATIENT
Start: 2024-10-22

## 2024-11-04 ENCOUNTER — OFFICE VISIT (OUTPATIENT)
Dept: ALLERGY | Age: 64
End: 2024-11-04
Payer: MEDICARE

## 2024-11-04 VITALS
DIASTOLIC BLOOD PRESSURE: 78 MMHG | SYSTOLIC BLOOD PRESSURE: 119 MMHG | BODY MASS INDEX: 26.68 KG/M2 | HEIGHT: 66 IN | HEART RATE: 104 BPM | OXYGEN SATURATION: 98 % | WEIGHT: 166 LBS

## 2024-11-04 DIAGNOSIS — Z91.018 FOOD ALLERGY: ICD-10-CM

## 2024-11-04 DIAGNOSIS — R76.8 ELEVATED IGE LEVEL: ICD-10-CM

## 2024-11-04 DIAGNOSIS — Z91.018 NUT ALLERGY: Primary | ICD-10-CM

## 2024-11-04 DIAGNOSIS — Z91.018 CLASSIC IGE MEDIATED FOOD ALLERGY: ICD-10-CM

## 2024-11-04 PROCEDURE — G8427 DOCREV CUR MEDS BY ELIG CLIN: HCPCS | Performed by: NURSE PRACTITIONER

## 2024-11-04 PROCEDURE — G8417 CALC BMI ABV UP PARAM F/U: HCPCS | Performed by: NURSE PRACTITIONER

## 2024-11-04 PROCEDURE — G8400 PT W/DXA NO RESULTS DOC: HCPCS | Performed by: NURSE PRACTITIONER

## 2024-11-04 PROCEDURE — G8484 FLU IMMUNIZE NO ADMIN: HCPCS | Performed by: NURSE PRACTITIONER

## 2024-11-04 PROCEDURE — 1090F PRES/ABSN URINE INCON ASSESS: CPT | Performed by: NURSE PRACTITIONER

## 2024-11-04 PROCEDURE — 1036F TOBACCO NON-USER: CPT | Performed by: NURSE PRACTITIONER

## 2024-11-04 PROCEDURE — 3017F COLORECTAL CA SCREEN DOC REV: CPT | Performed by: NURSE PRACTITIONER

## 2024-11-04 PROCEDURE — 1123F ACP DISCUSS/DSCN MKR DOCD: CPT | Performed by: NURSE PRACTITIONER

## 2024-11-04 PROCEDURE — 99213 OFFICE O/P EST LOW 20 MIN: CPT | Performed by: NURSE PRACTITIONER

## 2024-11-04 NOTE — PROGRESS NOTES
@[facility]@    Allergy & Asthma   [address]  [place] [address]  Ph:   [phone]  Fax:[phone]    Provider:  Dr. Cheryl Skelton    Follow Up          Diagnosis Orders   1. Nut allergy        2. Food allergy        3. Elevated IgE level        4. Classic IgE mediated food allergy            CHIEF COMPLAINT:   Chief Complaint   Patient presents with    Follow-up     Return in about 4 months (around 11/8/2024) for Lab review.         HISTORY OF PRESENT ILLNESS: ESTABLISHED PATIENT HERE FOR EVALUATION   64-year-old female here today for chronic food allergies.  Patient is brought in by her assisted living personnel.  Patient has a severe mental impairment.  She uses a gait belt to assist in ambulating.  Patient has difficulty following conversation.  I have reviewed with the patient's healthcare advocate patient's continued elevated IgE.  I presented some options including Xolair for IgE mediated food allergies.  See health assistant is reluctant to have the patient start on Xolair as any medication changes greatly impairs her health for a period of time and often to prove very difficult for the patient to adjust.  Severity of patient's IgE food allergies although her numbers are elevated with an IgE of 9500 and there are several nut allergies, milk allergies, chicken allergies etc. the patient seems to be happy.  No current anaphylaxis nor angioedema.  No skin rashes noted.  Severity Food allergies to the patient is mild.  Patient originally was brought to this clinic for evaluation of pneumonia which was found to be related to chronic aspiration.  No nausea, vomiting, fever noted today.  Onset of food allergies has been evaluated for the last year with no change in the patient's condition.  They have limited foods including cutting out eggs, avoiding milk, but find it difficult to avoid all the foods patient found to be allergic to.  They are doing the best they can with what they have to offer

## 2024-11-21 ENCOUNTER — HOSPITAL ENCOUNTER (EMERGENCY)
Age: 64
Discharge: HOME | End: 2024-11-21
Payer: MEDICARE

## 2024-11-21 ENCOUNTER — APPOINTMENT (OUTPATIENT)
Dept: GENERAL RADIOLOGY | Age: 64
End: 2024-11-21
Payer: MEDICARE

## 2024-11-21 VITALS
RESPIRATION RATE: 20 BRPM | DIASTOLIC BLOOD PRESSURE: 78 MMHG | BODY MASS INDEX: 29.86 KG/M2 | OXYGEN SATURATION: 100 % | SYSTOLIC BLOOD PRESSURE: 133 MMHG | TEMPERATURE: 98.4 F | HEART RATE: 62 BPM | WEIGHT: 185 LBS

## 2024-11-21 DIAGNOSIS — J18.9 PNEUMONIA OF BOTH LOWER LOBES DUE TO INFECTIOUS ORGANISM: Primary | ICD-10-CM

## 2024-11-21 LAB
FLUAV AG SPEC QL: NEGATIVE
FLUBV AG SPEC QL: NEGATIVE

## 2024-11-21 PROCEDURE — 99213 OFFICE O/P EST LOW 20 MIN: CPT

## 2024-11-21 PROCEDURE — 99213 OFFICE O/P EST LOW 20 MIN: CPT | Performed by: NURSE PRACTITIONER

## 2024-11-21 PROCEDURE — 71046 X-RAY EXAM CHEST 2 VIEWS: CPT

## 2024-11-21 PROCEDURE — 87804 INFLUENZA ASSAY W/OPTIC: CPT

## 2024-11-21 RX ORDER — DOXYCYCLINE HYCLATE 100 MG
100 TABLET ORAL 2 TIMES DAILY
Qty: 14 TABLET | Refills: 0 | Status: SHIPPED | OUTPATIENT
Start: 2024-11-21 | End: 2024-11-28

## 2024-11-21 ASSESSMENT — PAIN - FUNCTIONAL ASSESSMENT
PAIN_FUNCTIONAL_ASSESSMENT: WONG-BAKER FACES
PAIN_FUNCTIONAL_ASSESSMENT: PREVENTS OR INTERFERES SOME ACTIVE ACTIVITIES AND ADLS

## 2024-11-21 ASSESSMENT — PAIN SCALES - WONG BAKER: WONGBAKER_NUMERICALRESPONSE: HURTS WHOLE LOT

## 2024-11-21 ASSESSMENT — ENCOUNTER SYMPTOMS
COUGH: 1
EYES NEGATIVE: 1
DIARRHEA: 0
CONSTIPATION: 0
SINUS PAIN: 0
WHEEZING: 0
VOMITING: 0
NAUSEA: 0
SINUS PRESSURE: 0
RHINORRHEA: 0
SORE THROAT: 0
SHORTNESS OF BREATH: 0
ABDOMINAL PAIN: 0

## 2024-11-21 ASSESSMENT — PAIN DESCRIPTION - ONSET: ONSET: PROGRESSIVE

## 2024-11-21 ASSESSMENT — PAIN DESCRIPTION - PAIN TYPE: TYPE: ACUTE PAIN

## 2024-11-21 ASSESSMENT — PAIN DESCRIPTION - LOCATION: LOCATION: CHEST

## 2024-11-21 ASSESSMENT — PAIN DESCRIPTION - FREQUENCY: FREQUENCY: CONTINUOUS

## 2024-11-21 NOTE — ED PROVIDER NOTES
[facility] URGENT CARE  UrgentCare Encounter      CHIEFCOMPLAINT       Chief Complaint   Patient presents with    Cough    Nasal Congestion    exposure to the flu       Nurses Notes reviewed and I agree except as noted in the HPI.  HISTORY OF PRESENT ILLNESS   Tracie Castellon is a 64 y.o. female who presents to urgent care with complaints of cough, nasal congestion and exposure to flu.  Patient does live in a group home and someone there has been positive for flu B.  Caregiver states she has had a cough for approximately 2 weeks.    REVIEW OF SYSTEMS     Review of Systems   Constitutional:  Negative for chills, fatigue, fever and unexpected weight change.   HENT:  Negative for congestion, postnasal drip, rhinorrhea, sinus pressure, sinus pain, sneezing and sore throat.    Eyes: Negative.    Respiratory:  Positive for cough. Negative for shortness of breath and wheezing.    Cardiovascular:  Negative for chest pain.   Gastrointestinal:  Negative for abdominal pain, constipation, diarrhea, nausea and vomiting.   Endocrine: Negative.    Genitourinary:  Negative for difficulty urinating, dyspareunia, dysuria, hematuria, urgency, vaginal bleeding, vaginal discharge and vaginal pain.   Musculoskeletal:  Negative for myalgias.   Skin:  Negative for rash.   Neurological:  Negative for dizziness, light-headedness and headaches.   Hematological:  Negative for adenopathy.   Psychiatric/Behavioral:  Negative for agitation.        PAST MEDICAL HISTORY         Diagnosis Date    Arthritis     Bipolar disorder (HCC)     Cerebral artery occlusion with cerebral infarction (HCC)     CHF (congestive heart failure) (HCC)     Chronic kidney disease     Coarse tremors     Constipation     COPD (chronic obstructive pulmonary disease) (HCC)     Depression     Gait difficulty     General weakness     GERD (gastroesophageal reflux disease)     Hypertension     Mental retardation     Pneumonia     Seizures (HCC)     Thyroid

## 2025-02-06 ENCOUNTER — APPOINTMENT (OUTPATIENT)
Dept: GENERAL RADIOLOGY | Age: 65
End: 2025-02-06
Payer: MEDICARE

## 2025-02-06 ENCOUNTER — HOSPITAL ENCOUNTER (EMERGENCY)
Age: 65
Discharge: HOME | End: 2025-02-06
Payer: MEDICARE

## 2025-02-06 VITALS
DIASTOLIC BLOOD PRESSURE: 57 MMHG | RESPIRATION RATE: 20 BRPM | TEMPERATURE: 98.1 F | SYSTOLIC BLOOD PRESSURE: 129 MMHG | HEART RATE: 73 BPM | OXYGEN SATURATION: 96 %

## 2025-02-06 DIAGNOSIS — J18.9 PNEUMONIA OF BOTH LOWER LOBES DUE TO INFECTIOUS ORGANISM: ICD-10-CM

## 2025-02-06 DIAGNOSIS — U07.1 COVID-19: Primary | ICD-10-CM

## 2025-02-06 LAB
FLUAV AG SPEC QL: NEGATIVE
FLUBV AG SPEC QL: NEGATIVE
SARS-COV-2 RDRP RESP QL NAA+PROBE: DETECTED

## 2025-02-06 PROCEDURE — 87635 SARS-COV-2 COVID-19 AMP PRB: CPT

## 2025-02-06 PROCEDURE — 99213 OFFICE O/P EST LOW 20 MIN: CPT

## 2025-02-06 PROCEDURE — 71046 X-RAY EXAM CHEST 2 VIEWS: CPT

## 2025-02-06 PROCEDURE — 87804 INFLUENZA ASSAY W/OPTIC: CPT

## 2025-02-06 RX ORDER — DOXYCYCLINE HYCLATE 100 MG
100 TABLET ORAL 2 TIMES DAILY
Qty: 14 TABLET | Refills: 0 | Status: SHIPPED | OUTPATIENT
Start: 2025-02-06 | End: 2025-02-13

## 2025-02-06 ASSESSMENT — ENCOUNTER SYMPTOMS
GASTROINTESTINAL NEGATIVE: 1
COUGH: 1

## 2025-02-06 NOTE — ED TRIAGE NOTES
To room with c/o cough and chest congestion that first noticed by  today. Pt resides in a group home presents with  today. reports pt is prone to pneumonia. \"She is not as with it.\"   She does work at [employer] and is unsure of exposures. Also takes liquid thickened due to swallowing difficulty.

## 2025-02-06 NOTE — DISCHARGE INSTRUCTIONS
Rest.  Increase fluids.  Tylenol as needed for pain or fever.  Antibiotics as directed.  Follow-up with primary care provider in 3 to 5 days call for an appointment Go to the emergency room sooner for any other symptoms deemed emergent/as chest pain shortness of breath or any other concerns.

## 2025-02-06 NOTE — ED PROVIDER NOTES
[facility] URGENT CARE  UrgentCare Encounter      CHIEFCOMPLAINT       Chief Complaint   Patient presents with    Cough    Chest Congestion       Nurses Notes reviewed and I agree except as noted in the HPI.  HISTORY OF PRESENT ILLNESS   Tracie Castellon is a 64 y.o. female who presents today with caregiver for cough and congestion that was noticed at the group home on yesterday.  States patient is prone to having pneumonia.    REVIEW OF SYSTEMS     Review of Systems   Constitutional:  Positive for activity change. Negative for fatigue.   HENT:  Positive for congestion.    Respiratory:  Positive for cough.    Cardiovascular: Negative.    Gastrointestinal: Negative.    Genitourinary: Negative.    Musculoskeletal: Negative.    Neurological: Negative.        PAST MEDICAL HISTORY         Diagnosis Date    Arthritis     Bipolar disorder (HCC)     Cerebral artery occlusion with cerebral infarction (HCC)     CHF (congestive heart failure) (HCC)     Chronic kidney disease     Coarse tremors     Constipation     COPD (chronic obstructive pulmonary disease) (HCC)     Depression     Gait difficulty     General weakness     GERD (gastroesophageal reflux disease)     Hypertension     Mental retardation     Pneumonia     Seizures (HCC)     Thyroid disease     UTI (lower urinary tract infection)        SURGICAL HISTORY     Patient  has a past surgical history that includes Hysterectomy; pr egd transoral biopsy single/multiple (Left, 2/10/2018); Upper gastrointestinal endoscopy (02/08/2018); Colonoscopy; and Colonoscopy (N/A, 10/3/2023).    CURRENT MEDICATIONS       Previous Medications    ARTIFICIAL TEAR SOLUTION (GENTEAL TEARS OP)    Apply to eye daily    ATORVASTATIN (LIPITOR) 40 MG TABLET    Take 1 tablet by mouth every evening    CALCITONIN (MIACALCIN) 200 UNIT/ACT NASAL SPRAY    1 spray by Nasal route daily    CHLORHEXIDINE (PERIDEX) 0.12 % SOLUTION    Take 15 mLs by mouth 2 times daily    CLOPIDOGREL (PLAVIX) 75 MG

## 2025-02-24 PROBLEM — I50.33 ACUTE ON CHRONIC DIASTOLIC CONGESTIVE HEART FAILURE (HCC): Status: RESOLVED | Noted: 2025-02-24 | Resolved: 2025-02-24

## 2025-02-24 PROBLEM — I50.33 ACUTE ON CHRONIC DIASTOLIC CONGESTIVE HEART FAILURE (HCC): Status: ACTIVE | Noted: 2025-02-24

## 2025-02-24 NOTE — PROGRESS NOTES
[facility] [place] [facility]  [facility] CARDIOLOGY  [address]  [address]  [facility] [place] [facility] [address]  Dept: [phone]  Dept Fax: [phone]  Loc: [phone]    Visit Date: 2/26/2025    Tracie Castellon is a 64 y.o. female who presents todayfor:  Chief Complaint   Patient presents with    6 Month Follow-Up     Cardiologist: Jeri Pineda of developmental delay, small pericardial effusion, moderate AS.     HPI: 6 month f/u   Some cough lately, had covid. Some chest pains, or breathing issues. About the same activity level as normal. No new issues lately. No signficant swelling. Some weight gain about 10 lbs over the last 6-8 months. Overall, caregiver states she has been about the same. Her legal guardian contacted by phone, agreeable to 6 month fu and decide about further management then.     Past Surgical History:   Procedure Laterality Date    COLONOSCOPY      COLONOSCOPY N/A 10/3/2023    COLONOSCOPY performed by Kirk Ashraf MD at [facility] ENDOSCOPY    HYSTERECTOMY (CERVIX STATUS UNKNOWN)      VT EGD TRANSORAL BIOPSY SINGLE/MULTIPLE Left 2/10/2018    EGD BIOPSY performed by Nicolas Ramirez MD at [facility] Endoscopy    UPPER GASTROINTESTINAL ENDOSCOPY  02/08/2018    DR. [facility]IREZ[facility]     Family History   Problem Relation Age of Onset    Cancer Mother      Social History     Tobacco Use    Smoking status: Never     Passive exposure: Never    Smokeless tobacco: Never    Tobacco comments:     Never smoker   Substance Use Topics    Alcohol use: No      Current Outpatient Medications   Medication Sig Dispense Refill    loratadine (CLARITIN) 10 MG tablet (NOT CALENDAR PACKED) TAKE 1 TABLET BY MOUTH EVERY NIGHT AT BEDTIME.  MAY TAKE ONE ADDITIONAL 1OMG LORATADINE WITH ANAPHYLAXIS 31 tablet 11    famotidine (PEPCID) 20 MG tablet (NOT CALENDAR PACKED) TAKE 1 TABLET BY MOUTH ONCE EVERY DAY.  MAY TAKE ADDITIONAL FAMOTIDINE WITH ANAPHYLAXIS 31 tablet 11    EPINEPHrine (EPIPEN) 0.3 MG/0.3ML SOAJ injection

## 2025-02-26 ENCOUNTER — OFFICE VISIT (OUTPATIENT)
Dept: CARDIOLOGY CLINIC | Age: 65
End: 2025-02-26
Payer: MEDICARE

## 2025-02-26 VITALS
SYSTOLIC BLOOD PRESSURE: 136 MMHG | WEIGHT: 191.2 LBS | DIASTOLIC BLOOD PRESSURE: 91 MMHG | BODY MASS INDEX: 30.73 KG/M2 | HEIGHT: 66 IN | HEART RATE: 89 BPM

## 2025-02-26 DIAGNOSIS — I35.0 MODERATE AORTIC STENOSIS: Primary | ICD-10-CM

## 2025-02-26 DIAGNOSIS — I50.32 CHRONIC DIASTOLIC CHF (CONGESTIVE HEART FAILURE) (HCC): ICD-10-CM

## 2025-02-26 PROCEDURE — 1123F ACP DISCUSS/DSCN MKR DOCD: CPT | Performed by: STUDENT IN AN ORGANIZED HEALTH CARE EDUCATION/TRAINING PROGRAM

## 2025-02-26 PROCEDURE — G8400 PT W/DXA NO RESULTS DOC: HCPCS | Performed by: STUDENT IN AN ORGANIZED HEALTH CARE EDUCATION/TRAINING PROGRAM

## 2025-02-26 PROCEDURE — 99214 OFFICE O/P EST MOD 30 MIN: CPT | Performed by: STUDENT IN AN ORGANIZED HEALTH CARE EDUCATION/TRAINING PROGRAM

## 2025-02-26 PROCEDURE — G8417 CALC BMI ABV UP PARAM F/U: HCPCS | Performed by: STUDENT IN AN ORGANIZED HEALTH CARE EDUCATION/TRAINING PROGRAM

## 2025-02-26 PROCEDURE — G8427 DOCREV CUR MEDS BY ELIG CLIN: HCPCS | Performed by: STUDENT IN AN ORGANIZED HEALTH CARE EDUCATION/TRAINING PROGRAM

## 2025-02-26 PROCEDURE — 1036F TOBACCO NON-USER: CPT | Performed by: STUDENT IN AN ORGANIZED HEALTH CARE EDUCATION/TRAINING PROGRAM

## 2025-02-26 PROCEDURE — 1090F PRES/ABSN URINE INCON ASSESS: CPT | Performed by: STUDENT IN AN ORGANIZED HEALTH CARE EDUCATION/TRAINING PROGRAM

## 2025-02-26 PROCEDURE — 3017F COLORECTAL CA SCREEN DOC REV: CPT | Performed by: STUDENT IN AN ORGANIZED HEALTH CARE EDUCATION/TRAINING PROGRAM

## 2025-02-26 NOTE — PATIENT INSTRUCTIONS
You may receive a survey regarding the care you received during your visit. We encourage you to complete and return your survey, as your input is valuable to us. We hope you will choose us in the future for your healthcare needs. Thank you!    Your Medical Assistant today: Reyes Cruz  Thank you for coming to our office! It was a pleasure to serve you.     Happy [employer] Month!

## 2025-02-27 DIAGNOSIS — R79.89 HIGH PLASMA HISTAMINE: ICD-10-CM

## 2025-02-27 DIAGNOSIS — Z91.018 FOOD ALLERGY: ICD-10-CM

## 2025-02-27 RX ORDER — FAMOTIDINE 20 MG/1
TABLET, FILM COATED ORAL
Qty: 30 TABLET | Refills: 11 | OUTPATIENT
Start: 2025-02-27

## 2025-02-27 RX ORDER — LORATADINE 10 MG/1
TABLET ORAL
Qty: 30 TABLET | Refills: 11 | OUTPATIENT
Start: 2025-02-27

## 2025-03-03 DIAGNOSIS — R79.89 HIGH PLASMA HISTAMINE: ICD-10-CM

## 2025-03-03 DIAGNOSIS — Z91.018 FOOD ALLERGY: ICD-10-CM

## 2025-03-03 RX ORDER — LORATADINE 10 MG/1
TABLET ORAL
Qty: 30 TABLET | Refills: 11 | OUTPATIENT
Start: 2025-03-03

## 2025-03-03 RX ORDER — FAMOTIDINE 20 MG/1
TABLET, FILM COATED ORAL
Qty: 30 TABLET | Refills: 11 | OUTPATIENT
Start: 2025-03-03

## 2025-03-06 ENCOUNTER — HOSPITAL ENCOUNTER (EMERGENCY)
Age: 65
Discharge: HOME | End: 2025-03-06
Payer: MEDICARE

## 2025-03-06 ENCOUNTER — APPOINTMENT (OUTPATIENT)
Dept: GENERAL RADIOLOGY | Age: 65
End: 2025-03-06
Payer: MEDICARE

## 2025-03-06 VITALS
DIASTOLIC BLOOD PRESSURE: 128 MMHG | TEMPERATURE: 98.4 F | OXYGEN SATURATION: 95 % | RESPIRATION RATE: 18 BRPM | SYSTOLIC BLOOD PRESSURE: 155 MMHG | BODY MASS INDEX: 28.93 KG/M2 | WEIGHT: 180 LBS | HEART RATE: 76 BPM | HEIGHT: 66 IN

## 2025-03-06 DIAGNOSIS — M54.50 LUMBAR PAIN: Primary | ICD-10-CM

## 2025-03-06 DIAGNOSIS — R30.0 DYSURIA: ICD-10-CM

## 2025-03-06 LAB
BILIRUB UR STRIP.AUTO-MCNC: NEGATIVE MG/DL
CHARACTER UR: CLEAR
COLOR, UA: ABNORMAL
GLUCOSE UR QL STRIP.AUTO: NEGATIVE MG/DL
KETONES UR QL STRIP.AUTO: ABNORMAL
NITRITE UR QL STRIP.AUTO: NEGATIVE
PH UR STRIP.AUTO: 7 [PH] (ref 5–9)
PROT UR STRIP.AUTO-MCNC: NEGATIVE MG/DL
RBC #/AREA URNS HPF: NEGATIVE /[HPF]
SP GR UR STRIP.AUTO: 1.01 (ref 1–1.03)
UROBILINOGEN, URINE: 1 EU/DL (ref 0.2–1)
WBC #/AREA URNS HPF: NEGATIVE /[HPF]

## 2025-03-06 PROCEDURE — 99213 OFFICE O/P EST LOW 20 MIN: CPT

## 2025-03-06 PROCEDURE — 72100 X-RAY EXAM L-S SPINE 2/3 VWS: CPT

## 2025-03-06 PROCEDURE — 87086 URINE CULTURE/COLONY COUNT: CPT

## 2025-03-06 PROCEDURE — 81003 URINALYSIS AUTO W/O SCOPE: CPT

## 2025-03-06 ASSESSMENT — ENCOUNTER SYMPTOMS
DIARRHEA: 0
CONSTIPATION: 0
NAUSEA: 0
VOMITING: 0

## 2025-03-06 ASSESSMENT — PAIN - FUNCTIONAL ASSESSMENT
PAIN_FUNCTIONAL_ASSESSMENT: PREVENTS OR INTERFERES SOME ACTIVE ACTIVITIES AND ADLS
PAIN_FUNCTIONAL_ASSESSMENT: 0-10

## 2025-03-06 ASSESSMENT — PAIN SCALES - GENERAL: PAINLEVEL_OUTOF10: 8

## 2025-03-06 ASSESSMENT — PAIN DESCRIPTION - LOCATION: LOCATION: BACK

## 2025-03-06 ASSESSMENT — PAIN DESCRIPTION - PAIN TYPE: TYPE: ACUTE PAIN

## 2025-03-06 ASSESSMENT — PAIN DESCRIPTION - FREQUENCY: FREQUENCY: CONTINUOUS

## 2025-03-06 ASSESSMENT — PAIN DESCRIPTION - ORIENTATION: ORIENTATION: LEFT

## 2025-03-06 ASSESSMENT — LIFESTYLE VARIABLES
HOW OFTEN DO YOU HAVE A DRINK CONTAINING ALCOHOL: NEVER
HOW MANY STANDARD DRINKS CONTAINING ALCOHOL DO YOU HAVE ON A TYPICAL DAY: PATIENT DOES NOT DRINK

## 2025-03-06 ASSESSMENT — PAIN DESCRIPTION - ONSET: ONSET: GRADUAL

## 2025-03-06 ASSESSMENT — PAIN DESCRIPTION - DESCRIPTORS: DESCRIPTORS: ACHING;SORE

## 2025-03-06 NOTE — ED NOTES
Pt urinated in [facility] for collection.  Sample to lab.     Nasir Pfeiffer, RN  03/06/25 1400

## 2025-03-06 NOTE — ED TRIAGE NOTES
Pt to [facility] in wheelchair with group home leader with low back pain.  Pain started today.  No injury to back area.

## 2025-03-06 NOTE — DISCHARGE INSTRUCTIONS
Culture results in 24-48 hours.  Increase water intake, frequent hand washing.  Tylenol / Ibuprofen as needed for fever and or pain.  Follow up with PCP in 3-5 days if no improvement or sooner with worsening symptoms.

## 2025-03-06 NOTE — ED PROVIDER NOTES
[facility] URGENT CARE  Urgent Care Encounter       CHIEF COMPLAINT       Chief Complaint   Patient presents with    Back Pain       Nurses Notes reviewed and I agree except as noted in the HPI.  HISTORY OF PRESENT ILLNESS   Tracie Castellon is a 64 y.o. female who presents with concerns of lower back pain that started today. Patient presents with group home leader who denies any known injury. Reports she was walking when reported pain and since been leaning towards the right. When patient asked where pain is, she points to low back. Patient reports she fell, however unable to tell us when or how. Group home leader is not aware of any fall.   Denies any medication for symptom management.     HPI    REVIEW OF SYSTEMS     Review of Systems   Constitutional:  Negative for fever.   Gastrointestinal:  Negative for constipation, diarrhea, nausea and vomiting.   Genitourinary:  Positive for dysuria.   Musculoskeletal:         Back pain   Psychiatric/Behavioral:  Positive for confusion (at baseline per group home representitive).    All other systems reviewed and are negative.      PAST MEDICAL HISTORY         Diagnosis Date    Arthritis     Bipolar disorder (HCC)     Cerebral artery occlusion with cerebral infarction (HCC)     CHF (congestive heart failure) (HCC)     Chronic kidney disease     Coarse tremors     Constipation     COPD (chronic obstructive pulmonary disease) (HCC)     Depression     Gait difficulty     General weakness     GERD (gastroesophageal reflux disease)     Hypertension     Mental retardation     Pneumonia     Seizures (HCC)     Thyroid disease     UTI (lower urinary tract infection)        SURGICALHISTORY     Patient  has a past surgical history that includes Hysterectomy; pr egd transoral biopsy single/multiple (Left, 2/10/2018); Upper gastrointestinal endoscopy (02/08/2018); Colonoscopy; and Colonoscopy (N/A, 10/3/2023).    CURRENT MEDICATIONS       Previous Medications    ARTIFICIAL TEAR  SOLUTION (GENTEAL TEARS OP)    Apply to eye daily    ATORVASTATIN (LIPITOR) 40 MG TABLET    Take 1 tablet by mouth every evening    CALCITONIN (MIACALCIN) 200 UNIT/ACT NASAL SPRAY    1 spray by Nasal route daily    CHLORHEXIDINE (PERIDEX) 0.12 % SOLUTION    Take 15 mLs by mouth 2 times daily    CLOPIDOGREL (PLAVIX) 75 MG TABLET    Take 1 tablet by mouth daily    CLOZAPINE (CLOZARIL) 100 MG TABLET    Take 1 tablet by mouth daily    CPAP MACHINE MISC    by Does not apply route Add EPR 1-3 for comfort    DIPHENHYDRAMINE (BENADRYL ALLERGY) 25 MG TABLET    Take 1 tablet by mouth every 4 hours as needed for Itching (for anaphylaxis symptoms)    DIVALPROEX (DEPAKOTE) 500 MG ER TABLET    Take 1 tablet by mouth nightly.    DOCUSATE SODIUM (COLACE) 100 MG CAPSULE    Take 1 capsule by mouth 2 times daily as needed    EPINEPHRINE (EPIPEN) 0.3 MG/0.3ML SOAJ INJECTION    INJECT 0.3ML INTRAMUSCULARLY ONCE FOR 1 DOSE ADMINISTER FOR ANAPHYLAXIS    FAMOTIDINE (PEPCID) 20 MG TABLET    (NOT CALENDAR PACKED) TAKE 1 TABLET BY MOUTH ONCE EVERY DAY.  MAY TAKE ADDITIONAL FAMOTIDINE WITH ANAPHYLAXIS    FLUDROCORTISONE (FLORINEF) 0.1 MG TABLET    Take 1 tablet by mouth daily    LACTOBACILLUS (ACIDOPHILUS) TABS    Take 1 tablet by mouth daily    LACTULOSE (CHRONULAC) 10 GM/15ML SOLUTION        LEVOTHYROXINE (SYNTHROID) 175 MCG TABLET    Take 1 tablet by mouth Daily Take on empty stomach    LITHIUM 300 MG CAPSULE    Take 1 capsule by mouth nightly    LORATADINE (CLARITIN) 10 MG TABLET    (NOT CALENDAR PACKED) TAKE 1 TABLET BY MOUTH EVERY NIGHT AT BEDTIME.  MAY TAKE ONE ADDITIONAL 1OMG LORATADINE WITH ANAPHYLAXIS    MULTIPLE VITAMIN (DAILY-KYREE) TABS    Take 1 tablet by mouth daily    MULTIPLE VITAMINS-MINERALS (THERAPEUTIC MULTIVITAMIN-MINERALS) TABLET    Take 1 tablet by mouth daily    PANTOPRAZOLE (PROTONIX) 40 MG TABLET    Take 1 tablet by mouth every morning (before breakfast)    POLYETHYLENE GLYCOL (GLYCOLAX) 17 GM/SCOOP POWDER

## 2025-03-06 NOTE — ED NOTES
Pt unable to urinate at this time.  Pt given 8 oz of water to drink.     Nasir Pfeiffer, RN  03/06/25 8741

## 2025-03-08 LAB
BACTERIA UR CULT: ABNORMAL
ORGANISM: ABNORMAL

## 2025-03-10 NOTE — ED NOTES
Voice mail left with POA to call  us  This is in regard to urine growth contaminants     Christal So, RN  03/10/25 0801

## 2025-03-31 RX ORDER — SODIUM CHLORIDE 0.9 % (FLUSH) 0.9 %
5-40 SYRINGE (ML) INJECTION EVERY 12 HOURS SCHEDULED
Status: DISCONTINUED | OUTPATIENT
Start: 2025-03-31 | End: 2025-04-01 | Stop reason: HOSPADM

## 2025-03-31 RX ORDER — SODIUM CHLORIDE 9 MG/ML
INJECTION, SOLUTION INTRAVENOUS CONTINUOUS
Status: DISCONTINUED | OUTPATIENT
Start: 2025-03-31 | End: 2025-04-01 | Stop reason: HOSPADM

## 2025-03-31 RX ORDER — SODIUM CHLORIDE 9 MG/ML
25 INJECTION, SOLUTION INTRAVENOUS PRN
Status: DISCONTINUED | OUTPATIENT
Start: 2025-03-31 | End: 2025-04-01 | Stop reason: HOSPADM

## 2025-03-31 RX ORDER — SODIUM CHLORIDE 0.9 % (FLUSH) 0.9 %
5-40 SYRINGE (ML) INJECTION PRN
Status: DISCONTINUED | OUTPATIENT
Start: 2025-03-31 | End: 2025-04-01 | Stop reason: HOSPADM

## 2025-04-01 ENCOUNTER — ANESTHESIA (OUTPATIENT)
Dept: ENDOSCOPY | Age: 65
End: 2025-04-01
Payer: MEDICARE

## 2025-04-01 ENCOUNTER — HOSPITAL ENCOUNTER (OUTPATIENT)
Age: 65
Setting detail: OUTPATIENT SURGERY
Discharge: HOME | End: 2025-04-01
Attending: INTERNAL MEDICINE | Admitting: INTERNAL MEDICINE
Payer: MEDICARE

## 2025-04-01 ENCOUNTER — ANESTHESIA EVENT (OUTPATIENT)
Dept: ENDOSCOPY | Age: 65
End: 2025-04-01
Payer: MEDICARE

## 2025-04-01 ENCOUNTER — APPOINTMENT (OUTPATIENT)
Dept: ENDOSCOPY | Age: 65
End: 2025-04-01
Attending: INTERNAL MEDICINE
Payer: MEDICARE

## 2025-04-01 VITALS
RESPIRATION RATE: 16 BRPM | OXYGEN SATURATION: 95 % | HEART RATE: 69 BPM | SYSTOLIC BLOOD PRESSURE: 135 MMHG | HEIGHT: 66 IN | TEMPERATURE: 97.6 F | DIASTOLIC BLOOD PRESSURE: 71 MMHG | BODY MASS INDEX: 29.05 KG/M2

## 2025-04-01 PROCEDURE — 3700000001 HC ADD 15 MINUTES (ANESTHESIA): Performed by: INTERNAL MEDICINE

## 2025-04-01 PROCEDURE — 3609027000 HC COLONOSCOPY: Performed by: INTERNAL MEDICINE

## 2025-04-01 PROCEDURE — 3700000000 HC ANESTHESIA ATTENDED CARE: Performed by: INTERNAL MEDICINE

## 2025-04-01 PROCEDURE — 2580000003 HC RX 258: Performed by: INTERNAL MEDICINE

## 2025-04-01 PROCEDURE — 7100000010 HC PHASE II RECOVERY - FIRST 15 MIN: Performed by: INTERNAL MEDICINE

## 2025-04-01 PROCEDURE — 6360000002 HC RX W HCPCS: Performed by: REGISTERED NURSE

## 2025-04-01 PROCEDURE — 2709999900 HC NON-CHARGEABLE SUPPLY: Performed by: INTERNAL MEDICINE

## 2025-04-01 PROCEDURE — 7100000011 HC PHASE II RECOVERY - ADDTL 15 MIN: Performed by: INTERNAL MEDICINE

## 2025-04-01 RX ORDER — PROPOFOL 10 MG/ML
INJECTION, EMULSION INTRAVENOUS
Status: DISCONTINUED | OUTPATIENT
Start: 2025-04-01 | End: 2025-04-01 | Stop reason: SDUPTHER

## 2025-04-01 RX ADMIN — PROPOFOL 25 MG: 10 INJECTION, EMULSION INTRAVENOUS at 12:12

## 2025-04-01 RX ADMIN — PROPOFOL 25 MG: 10 INJECTION, EMULSION INTRAVENOUS at 12:17

## 2025-04-01 RX ADMIN — SODIUM CHLORIDE: 0.9 INJECTION, SOLUTION INTRAVENOUS at 10:44

## 2025-04-01 RX ADMIN — PROPOFOL 25 MG: 10 INJECTION, EMULSION INTRAVENOUS at 12:22

## 2025-04-01 RX ADMIN — PROPOFOL 25 MG: 10 INJECTION, EMULSION INTRAVENOUS at 12:26

## 2025-04-01 ASSESSMENT — PAIN - FUNCTIONAL ASSESSMENT
PAIN_FUNCTIONAL_ASSESSMENT: NONE - DENIES PAIN

## 2025-04-01 ASSESSMENT — COPD QUESTIONNAIRES: CAT_SEVERITY: NO INTERVAL CHANGE

## 2025-04-01 NOTE — PROGRESS NOTES
Admitted to Endo department and admitted to Endo room 11  Plan of care reviewed with patient.   Call light within reach.   Allergies reviewed with Hilaria Stockton  Bed in lowest position, locked, with one bed rail up.   Appropriate arm bands on patient.   Bathroom offered.   All questions and concerns of patient addressed    Name: Hilaria Stockton  Relationship to patient: Caregiver  Phone number: [phone]

## 2025-04-01 NOTE — ANESTHESIA PRE PROCEDURE
Department of Anesthesiology  Preprocedure Note       Name:  Tracie Castellon   Age:  64 y.o.  :  1960                                          MRN:  [id number]         Date:  2025      Surgeon: Surgeon(s):  Kirk Ashraf MD    Procedure: Procedure(s):  COLONOSCOPY DIAGNOSTIC    Medications prior to admission:   Prior to Admission medications    Medication Sig Start Date End Date Taking? Authorizing Provider   loratadine (CLARITIN) 10 MG tablet (NOT CALENDAR PACKED) TAKE 1 TABLET BY MOUTH EVERY NIGHT AT BEDTIME.  MAY TAKE ONE ADDITIONAL 1OMG LORATADINE WITH ANAPHYLAXIS 10/22/24  Yes Cheryl Skelton APRN - CNP   famotidine (PEPCID) 20 MG tablet (NOT CALENDAR PACKED) TAKE 1 TABLET BY MOUTH ONCE EVERY DAY.  MAY TAKE ADDITIONAL FAMOTIDINE WITH ANAPHYLAXIS 10/22/24  Yes Cheryl Skelton APRN - CNP   Multiple Vitamin (DAILY-KYREE) TABS Take 1 tablet by mouth daily 24  Yes Shiloh Garcia MD   atorvastatin (LIPITOR) 40 MG tablet Take 1 tablet by mouth every evening 24  Yes ProviderShiloh MD   Artificial Tear Solution (GENTEAL TEARS OP) Apply to eye daily 21  Yes Shiloh Garcia MD   fludrocortisone (FLORINEF) 0.1 MG tablet Take 1 tablet by mouth daily   Yes Shiloh Garcia MD   diphenhydrAMINE (BENADRYL ALLERGY) 25 MG tablet Take 1 tablet by mouth every 4 hours as needed for Itching (for anaphylaxis symptoms) 3/25/24  Yes Cheryl Skelton APRN - CNP   lactulose (CHRONULAC) 10 GM/15ML solution  24  Yes Shiloh Garcia MD   Probiotic Product (ACIDOPHILUS PROBIOTIC BLEND) CAPS  24  Yes Shiloh Garcia MD   polyethylene glycol (GLYCOLAX) 17 GM/SCOOP powder  24  Yes Shiloh Garcia MD   Lactobacillus (ACIDOPHILUS) TABS Take 1 tablet by mouth daily   Yes Shiloh Garcia MD   docusate sodium (COLACE) 100 MG capsule Take 1 capsule by mouth 2 times daily as needed   Yes Shiloh Garcia MD   Multiple Vitamins-Minerals (THERAPEUTIC

## 2025-04-01 NOTE — H&P
[facility]  Sedation/Analgesia History & Physical    Patient: Tracie Castellon : 1960  Med Rec#: [id number] Acc#: [id number]   Provider Performing Procedure: Kirk Ashraf MD  Primary Care Physician: Monica Peck, ARNALDO - JADIEL    PRE-PROCEDURE   Full CODE [x]Yes  DNR-CCA/DNR-CC []Yes   Brief History/Pre-Procedure Diagnosis:sc          MEDICAL HISTORY  []CAD/Valve  []Liver Disease  []Lung Disease []Diabetes  []Hypertension []Renal Disease  []Additional information:       has a past medical history of Arthritis, Bipolar disorder, Cerebral artery occlusion with cerebral infarction (HCC), CHF (congestive heart failure) (HCC), Chronic kidney disease, Coarse tremors, Constipation, COPD (chronic obstructive pulmonary disease) (HCC), Depression, Gait difficulty, General weakness, GERD (gastroesophageal reflux disease), Hypertension, Mental retardation, Pneumonia, Seizures (HCC), Thyroid disease, and UTI (lower urinary tract infection).    SURGICAL HISTORY   has a past surgical history that includes Hysterectomy; pr egd transoral biopsy single/multiple (Left, 2/10/2018); Upper gastrointestinal endoscopy (2018); Colonoscopy; and Colonoscopy (N/A, 10/3/2023).  Additional information:       ALLERGIES   Allergies as of 2025 - Fully Reviewed 2025   Allergen Reaction Noted    Ibuprofen Other (See Comments) 2024    Banana  2024    Carrot  2024    Cow's milk [milk (cow)]  2023    Egg white (diagnostic)  2024    Nsaids  2017    Other  2024    Peanut (diagnostic)  2024    Shrimp (diagnostic)  2024    Soybean-containing drug products  2024     Additional information:       MEDICATIONS   Coumadin Use Last 7 Days [x]No []Yes  Antiplatelet drug therapy use last 7 days  [x]No []Yes  Other anticoagulant use last 7 days  [x]No []Yes    Current Facility-Administered Medications:     sodium chloride flush 0.9 % injection 5-40 mL, 5-40

## 2025-04-01 NOTE — POST SEDATION
[facility]  Sedation/Analgesia Post Sedation Record    Patient: Tracie Castellon : 1960  Med Rec#: [id number] Acc#: [id number]   Procedure Performed By: Kirk Ashraf MD  Primary Care Physician: Monica Peck, ARNALDO - CNP    POST-PROCEDURE    Physicians/Assistants: Kirk Ashraf MD  Procedure Performed:    Sedation/Anesthesia:     Estimated Blood Loss:          ml  Specimens Removed:  [x]None []Other:      Disposition of Specimen:  []Pathology [x]Other      Complications:   [x]None Immediate []Other:     Post-procedure Diagnosis/Findings:             Recommendations:     ian Ashraf MD, MD FACG  Electronically signed 2025 at 12:39 PM

## 2025-04-01 NOTE — ANESTHESIA POSTPROCEDURE EVALUATION
Department of Anesthesiology  Postprocedure Note    Patient: Tracie Castellon  MRN: [id number]  YOB: 1960  Date of evaluation: 4/1/2025    Procedure Summary       Date: 04/01/25 Room / Location: [place] [address] / [facility]    Anesthesia Start: 1209 Anesthesia Stop: 1231    Procedure: COLONOSCOPY DIAGNOSTIC Diagnosis: Screening for colon cancer    Surgeons: Kirk Ashraf MD Responsible Provider: Kelby Dumont MD    Anesthesia Type: MAC ASA Status: 4            Anesthesia Type: No value filed.    Elise Phase I: Elise Score: 10    Elise Phase II: Elise Score: 10    Anesthesia Post Evaluation    Patient location during evaluation: PACU  Patient participation: complete - patient participated  Level of consciousness: awake  Pain score: 0  Airway patency: patent  Nausea & Vomiting: no vomiting and no nausea  Cardiovascular status: hemodynamically stable  Respiratory status: spontaneous ventilation and room air  Hydration status: stable        No notable events documented.

## 2025-04-01 NOTE — PROGRESS NOTES
Recovery mode, pt denies discomfort. Passing gas, taking in fluids.  discussed findings with pt and responsible party. Discharge instructions provided and understanding verbalized.

## 2025-04-01 NOTE — PROCEDURES
[address] [address]                             PROCEDURE NOTE      PATIENT NAME: KATIE GALDAMEZ          : 1960  MED REC NO: [id number]                       ROOM: [facility]  ACCOUNT NO: [id number]                       ADMIT DATE: 2025  PROVIDER: Kirk Ashraf MD      DATE OF PROCEDURE:  2025    SURGEON:  Kirk Ashraf MD    PROCEDURE:  Colonoscopy.    INDICATIONS FOR PROCEDURE:  Patient with a history of screening colon.  The patient had attempted colonoscopy before and it was not cleaned out and we are doing the screening colon.  Patient had a 2-day prep.  Actually, there were attempts at prepping even before that and see the preop note for rest of clinicals.    ASA CLASSIFICATION:  IV.    MEDICATIONS:  Per Anesthesia.    BIOPSY:  None.    PHOTOGRAPHS:  Yes.    BLOOD LOSS:  None.    DESCRIPTION OF PROCEDURE:  Informed consent was obtained after explaining risks and benefits of the procedure and conscious sedation.  Possible complications including bleeding, perforation, reaction to medicine, but not limiting to death, were discussed.    Afterwards, the CFQ-180 colonoscope was advanced to the right colon.  Cecum was not fully visualized.  Prep was not adequate.  Polyps larger than a centimeter can be missed.  Very long tortuous colon and external pressure was applied to reach that, but at the end, I felt that it is not possible to intubate the cecum.  No obvious mass or polyp in the ascending colon, visualized portion of cecum, transverse, descending, sigmoid, or rectum, but there were areas which could not be visualized very well.  Tolerated the procedure well.    IMPRESSION:  Multiple attempts with extended periods of cleaning and still we could not completely visualize the colon, partly for it being too long and tortuous and partly to prep was not adequate.  No obvious polyp or mass seen in the

## 2025-05-08 ENCOUNTER — HOSPITAL ENCOUNTER (EMERGENCY)
Age: 65
Discharge: HOME | End: 2025-05-08
Payer: MEDICARE

## 2025-05-08 VITALS
SYSTOLIC BLOOD PRESSURE: 195 MMHG | HEART RATE: 68 BPM | OXYGEN SATURATION: 99 % | RESPIRATION RATE: 20 BRPM | TEMPERATURE: 97.5 F | DIASTOLIC BLOOD PRESSURE: 163 MMHG

## 2025-05-08 DIAGNOSIS — Z86.73 HISTORY OF TIAS: ICD-10-CM

## 2025-05-08 DIAGNOSIS — S09.90XA CLOSED HEAD INJURY, INITIAL ENCOUNTER: Primary | ICD-10-CM

## 2025-05-08 DIAGNOSIS — I10 UNCONTROLLED HYPERTENSION: ICD-10-CM

## 2025-05-08 PROCEDURE — 99213 OFFICE O/P EST LOW 20 MIN: CPT | Performed by: NURSE PRACTITIONER

## 2025-05-08 PROCEDURE — 99214 OFFICE O/P EST MOD 30 MIN: CPT

## 2025-05-08 ASSESSMENT — ENCOUNTER SYMPTOMS
CHOKING: 0
BACK PAIN: 0
NAUSEA: 0
APNEA: 0
WHEEZING: 0
BLURRED VISION: 0
DIFFICULTY BREATHING: 0
SHORTNESS OF BREATH: 0
CHEST TIGHTNESS: 0
VOMITING: 0
COUGH: 0
STRIDOR: 0
DOUBLE VISION: 0
COLOR CHANGE: 0

## 2025-05-08 NOTE — ED TRIAGE NOTES
Pt to  with caregiver. Pt is from a group home. Caregiver reports pt fell today at a workshop and hit her head. Caregiver reports pt is not acting like her normal self and is walking differently. This happened around 930am. Pt is MRDD so neuro status unable to be obtained at this time.

## 2025-05-08 NOTE — ED PROVIDER NOTES
[facility] URGENT [facility]ARE  Urgent Care Encounter      CHIEF COMPLAINT       Chief Complaint   Patient presents with    Fall    Head Injury       Nurses Notes reviewed and I agree except as noted in the HPI.  HISTORY OFPRESENT ILLNESS   Tracie Castellon is a 64 y.o.  The history is provided by the patient and a caregiver. No  was used.   Head Injury  Location:  Occipital  Time since incident:  8 hours  Mechanism of injury: direct blow    Mechanism of injury comment:  Fell backward in chair and hit head on wall behind her  Pain details:     Quality:  Throbbing    Severity:  Unable to specify (nothing given today, gave tylenol yesterday after getting home from school)    Timing:  Constant    Progression:  Waxing and waning  Chronicity:  New  Relieved by:  Nothing  Worsened by:  Nothing  Ineffective treatments:  None tried  Associated symptoms: headache    Associated symptoms: no blurred vision, no difficulty breathing, no disorientation, no double vision, no focal weakness, no hearing loss, no loss of consciousness, no memory loss, no nausea, no neck pain, no numbness, no seizures, no tinnitus and no vomiting    Risk factors: no alcohol use, no aspirin use, not elderly, no obesity and no occupational exposure        REVIEW OF SYSTEMS     Review of Systems   Constitutional:  Positive for fatigue. Negative for activity change, appetite change, chills and diaphoresis.   HENT:  Negative for congestion, hearing loss and tinnitus.    Eyes:  Negative for blurred vision and double vision.   Respiratory:  Negative for apnea, cough, choking, chest tightness, shortness of breath, wheezing and stridor.    Cardiovascular:  Negative for chest pain, palpitations and leg swelling.   Gastrointestinal:  Negative for nausea and vomiting.   Musculoskeletal:  Negative for arthralgias, back pain, gait problem, joint swelling, myalgias and neck pain.   Skin:  Negative for color change, pallor, rash and wound.

## 2025-05-20 ENCOUNTER — TELEPHONE (OUTPATIENT)
Age: 65
End: 2025-05-20

## 2025-05-20 NOTE — TELEPHONE ENCOUNTER
LVM 5/15/25 on given phone number \"[place]\" and advised her of the appt needed changed    LVM 5/20/25 again on given phone number \"[place]\" and advised her of the day and time changed with new provider [place]

## 2025-08-15 ENCOUNTER — HOSPITAL ENCOUNTER (OUTPATIENT)
Dept: CT IMAGING | Age: 65
Discharge: HOME | End: 2025-08-15
Payer: MEDICARE

## 2025-08-15 DIAGNOSIS — R93.89 ABNORMAL CT OF THE CHEST: ICD-10-CM

## 2025-08-15 DIAGNOSIS — Z86.16 PERSONAL HISTORY OF COVID-19: ICD-10-CM

## 2025-08-15 DIAGNOSIS — Z87.01 HISTORY OF ASPIRATION PNEUMONIA: ICD-10-CM

## 2025-08-15 DIAGNOSIS — J98.6 ELEVATED HEMIDIAPHRAGM: ICD-10-CM

## 2025-08-15 PROCEDURE — 71250 CT THORAX DX C-: CPT

## 2025-08-19 ENCOUNTER — TELEPHONE (OUTPATIENT)
Dept: PULMONOLOGY | Age: 65
End: 2025-08-19

## 2025-08-26 ENCOUNTER — OFFICE VISIT (OUTPATIENT)
Dept: PULMONOLOGY | Age: 65
End: 2025-08-26
Payer: MEDICARE

## 2025-08-26 VITALS
WEIGHT: 181.6 LBS | HEART RATE: 71 BPM | DIASTOLIC BLOOD PRESSURE: 86 MMHG | TEMPERATURE: 97.8 F | SYSTOLIC BLOOD PRESSURE: 148 MMHG | BODY MASS INDEX: 29.18 KG/M2 | OXYGEN SATURATION: 93 % | HEIGHT: 66 IN

## 2025-08-26 DIAGNOSIS — R93.89 ABNORMAL CT OF THE CHEST: ICD-10-CM

## 2025-08-26 DIAGNOSIS — S22.068A OTHER CLOSED FRACTURE OF EIGHTH THORACIC VERTEBRA, INITIAL ENCOUNTER (HCC): ICD-10-CM

## 2025-08-26 DIAGNOSIS — Z87.01 HISTORY OF ASPIRATION PNEUMONIA: ICD-10-CM

## 2025-08-26 DIAGNOSIS — R59.0 AXILLARY LYMPHADENOPATHY: ICD-10-CM

## 2025-08-26 DIAGNOSIS — J98.6 ELEVATED HEMIDIAPHRAGM: Primary | ICD-10-CM

## 2025-08-26 PROCEDURE — G8417 CALC BMI ABV UP PARAM F/U: HCPCS

## 2025-08-26 PROCEDURE — 99214 OFFICE O/P EST MOD 30 MIN: CPT

## 2025-08-26 PROCEDURE — 1123F ACP DISCUSS/DSCN MKR DOCD: CPT

## 2025-08-26 PROCEDURE — 1160F RVW MEDS BY RX/DR IN RCRD: CPT

## 2025-08-26 PROCEDURE — 3017F COLORECTAL CA SCREEN DOC REV: CPT

## 2025-08-26 PROCEDURE — 1159F MED LIST DOCD IN RCRD: CPT

## 2025-08-26 PROCEDURE — 1036F TOBACCO NON-USER: CPT

## 2025-08-26 PROCEDURE — G8427 DOCREV CUR MEDS BY ELIG CLIN: HCPCS

## 2025-08-26 PROCEDURE — G8400 PT W/DXA NO RESULTS DOC: HCPCS

## 2025-08-26 PROCEDURE — 1090F PRES/ABSN URINE INCON ASSESS: CPT

## 2025-08-28 ENCOUNTER — APPOINTMENT (OUTPATIENT)
Dept: GENERAL RADIOLOGY | Age: 65
End: 2025-08-28
Payer: MEDICARE

## 2025-08-28 ENCOUNTER — HOSPITAL ENCOUNTER (EMERGENCY)
Age: 65
Discharge: HOME | End: 2025-08-28
Payer: MEDICARE

## 2025-08-28 VITALS
RESPIRATION RATE: 20 BRPM | HEIGHT: 67 IN | TEMPERATURE: 97 F | SYSTOLIC BLOOD PRESSURE: 119 MMHG | BODY MASS INDEX: 28.09 KG/M2 | HEART RATE: 63 BPM | OXYGEN SATURATION: 97 % | WEIGHT: 179 LBS | DIASTOLIC BLOOD PRESSURE: 103 MMHG

## 2025-08-28 DIAGNOSIS — N30.00 ACUTE CYSTITIS WITHOUT HEMATURIA: ICD-10-CM

## 2025-08-28 DIAGNOSIS — J18.9 PNEUMONIA OF LEFT LUNG DUE TO INFECTIOUS ORGANISM, UNSPECIFIED PART OF LUNG: Primary | ICD-10-CM

## 2025-08-28 LAB
BILIRUB UR STRIP.AUTO-MCNC: NEGATIVE MG/DL
CHARACTER UR: CLEAR
COLOR, UA: YELLOW
GLUCOSE UR QL STRIP.AUTO: NEGATIVE MG/DL
KETONES UR QL STRIP.AUTO: ABNORMAL
NITRITE UR QL STRIP.AUTO: NEGATIVE
PH UR STRIP.AUTO: 6 [PH] (ref 5–9)
PROT UR STRIP.AUTO-MCNC: NEGATIVE MG/DL
RBC #/AREA URNS HPF: NEGATIVE /[HPF]
SP GR UR STRIP.AUTO: 1.01 (ref 1–1.03)
UROBILINOGEN, URINE: 0.2 EU/DL (ref 0.2–1)
WBC #/AREA URNS HPF: ABNORMAL /[HPF]

## 2025-08-28 PROCEDURE — 99213 OFFICE O/P EST LOW 20 MIN: CPT

## 2025-08-28 PROCEDURE — 87086 URINE CULTURE/COLONY COUNT: CPT

## 2025-08-28 PROCEDURE — 99214 OFFICE O/P EST MOD 30 MIN: CPT | Performed by: NURSE PRACTITIONER

## 2025-08-28 PROCEDURE — 81003 URINALYSIS AUTO W/O SCOPE: CPT

## 2025-08-28 PROCEDURE — 71046 X-RAY EXAM CHEST 2 VIEWS: CPT

## 2025-08-28 RX ORDER — CEFDINIR 300 MG/1
300 CAPSULE ORAL 2 TIMES DAILY
Qty: 14 CAPSULE | Refills: 0 | Status: SHIPPED | OUTPATIENT
Start: 2025-08-28 | End: 2025-09-04

## 2025-08-28 ASSESSMENT — PAIN DESCRIPTION - PAIN TYPE: TYPE: ACUTE PAIN

## 2025-08-28 ASSESSMENT — PAIN DESCRIPTION - ONSET: ONSET: GRADUAL

## 2025-08-28 ASSESSMENT — ENCOUNTER SYMPTOMS
SORE THROAT: 1
COUGH: 1

## 2025-08-28 ASSESSMENT — PAIN - FUNCTIONAL ASSESSMENT: PAIN_FUNCTIONAL_ASSESSMENT: WONG-BAKER FACES

## 2025-08-28 ASSESSMENT — PAIN DESCRIPTION - FREQUENCY: FREQUENCY: INTERMITTENT

## 2025-08-28 ASSESSMENT — PAIN SCALES - WONG BAKER: WONGBAKER_NUMERICALRESPONSE: HURTS A LITTLE BIT

## 2025-08-30 LAB
BACTERIA UR CULT: ABNORMAL
ORGANISM: ABNORMAL

## 2025-09-04 ENCOUNTER — TRANSCRIBE ORDERS (OUTPATIENT)
Dept: ADMINISTRATIVE | Age: 65
End: 2025-09-04

## 2025-09-04 DIAGNOSIS — M81.0 AGE RELATED OSTEOPOROSIS, UNSPECIFIED PATHOLOGICAL FRACTURE PRESENCE: ICD-10-CM

## 2025-09-04 DIAGNOSIS — Z12.31 ENCOUNTER FOR SCREENING MAMMOGRAM FOR MALIGNANT NEOPLASM OF BREAST: ICD-10-CM

## 2025-09-04 DIAGNOSIS — Z00.00 WELLNESS EXAMINATION: Primary | ICD-10-CM

## (undated) DEVICE — GLOVE ORTHO 8   MSG9480